# Patient Record
Sex: FEMALE | Race: ASIAN | NOT HISPANIC OR LATINO | Employment: FULL TIME | URBAN - METROPOLITAN AREA
[De-identification: names, ages, dates, MRNs, and addresses within clinical notes are randomized per-mention and may not be internally consistent; named-entity substitution may affect disease eponyms.]

---

## 2017-01-04 ENCOUNTER — TELEPHONE (OUTPATIENT)
Dept: TRANSPLANT | Facility: CLINIC | Age: 42
End: 2017-01-04

## 2017-02-02 ENCOUNTER — OFFICE VISIT (OUTPATIENT)
Dept: FAMILY MEDICINE | Facility: CLINIC | Age: 42
End: 2017-02-02
Payer: COMMERCIAL

## 2017-02-02 VITALS
HEIGHT: 59 IN | SYSTOLIC BLOOD PRESSURE: 94 MMHG | HEART RATE: 80 BPM | WEIGHT: 93 LBS | BODY MASS INDEX: 18.75 KG/M2 | DIASTOLIC BLOOD PRESSURE: 70 MMHG | TEMPERATURE: 99.1 F

## 2017-02-02 DIAGNOSIS — Z23 NEED FOR PROPHYLACTIC VACCINATION AND INOCULATION AGAINST INFLUENZA: ICD-10-CM

## 2017-02-02 DIAGNOSIS — Z12.39 BREAST CANCER SCREENING: ICD-10-CM

## 2017-02-02 DIAGNOSIS — Z00.00 ROUTINE GENERAL MEDICAL EXAMINATION AT A HEALTH CARE FACILITY: Primary | ICD-10-CM

## 2017-02-02 DIAGNOSIS — Z94.0 KIDNEY REPLACED BY TRANSPLANT: ICD-10-CM

## 2017-02-02 DIAGNOSIS — D84.9 IMMUNOSUPPRESSED STATUS (H): ICD-10-CM

## 2017-02-02 DIAGNOSIS — Z97.5 IUD (INTRAUTERINE DEVICE) IN PLACE: ICD-10-CM

## 2017-02-02 PROCEDURE — 90471 IMMUNIZATION ADMIN: CPT | Performed by: FAMILY MEDICINE

## 2017-02-02 PROCEDURE — 90686 IIV4 VACC NO PRSV 0.5 ML IM: CPT | Performed by: FAMILY MEDICINE

## 2017-02-02 PROCEDURE — 87624 HPV HI-RISK TYP POOLED RSLT: CPT | Performed by: FAMILY MEDICINE

## 2017-02-02 PROCEDURE — G0145 SCR C/V CYTO,THINLAYER,RESCR: HCPCS | Performed by: FAMILY MEDICINE

## 2017-02-02 PROCEDURE — 99396 PREV VISIT EST AGE 40-64: CPT | Mod: 25 | Performed by: FAMILY MEDICINE

## 2017-02-02 NOTE — MR AVS SNAPSHOT
After Visit Summary   2/2/2017    Nena Underwood    MRN: [id number]           Patient Information     Date Of Birth          1975        Visit Information        Provider Department      2/2/2017 3:00 PM Nena Quiroz MD [facility] [place]        Today's Diagnoses     Routine general medical examination at a health care facility    -  1     IUD (intrauterine device) in place         Breast cancer screening           Care Instructions      Preventive Health Recommendations  Female Ages 40 to 49    Yearly exam:     See your health care provider every year in order to  1. Review health changes.   2. Discuss preventive care.    3. Review your medicines if your doctor prescribed any.      Get a Pap test every three years (unless you have an abnormal result and your provider advises testing more often).      If you get Pap tests with HPV test, you only need to test every 5 years, unless you have an abnormal result. You do not need a Pap test if your uterus was removed (hysterectomy) and you have not had cancer.      You should be tested each year for STDs (sexually transmitted diseases), if you're at risk.       Ask your doctor if you should have a mammogram.      Have a colonoscopy (test for colon cancer) if someone in your family has had colon cancer or polyps before age 50.       Have a cholesterol test every 5 years.       Have a diabetes test (fasting glucose) after age 45. If you are at risk for diabetes, you should have this test every 3 years.    Shots: Get a flu shot each year. Get a tetanus shot every 10 years.     Nutrition:     Eat at least 5 servings of fruits and vegetables each day.    Eat whole-grain bread, whole-wheat pasta and brown rice instead of white grains and rice.    Talk to your provider about Calcium and Vitamin D.     Lifestyle    Exercise at least 150 minutes a week (an average of 30 minutes a day, 5 days a week). This will help you control your weight and  prevent disease.    Limit alcohol to one drink per day.    No smoking.     Wear sunscreen to prevent skin cancer.    See your dentist every six months for an exam and cleaning.        Follow-ups after your visit        Follow-up notes from your care team     Return in about 1 year (around 2/2/2018) for Physical Exam.      Your next 10 appointments already scheduled     Jun 20, 2017  3:40 PM   (Arrive by 3:25 PM)   Return Kidney Transplant with Walter Chong MD   [facility] ([facility] [place])    [address] [place] [id number] [phone]              Future tests that were ordered for you today     Open Future Orders        Priority Expected Expires Ordered    Lipid Profile Routine  2/2/2018 2/2/2017    Glucose Routine  2/2/2018 2/2/2017    *MA Screening Digital Bilateral Routine  2/2/2018 2/2/2017            Who to contact     If you have questions or need follow up information about today's clinic visit or your schedule please contact [facility] directly at [phone].  Normal or non-critical lab and imaging results will be communicated to you by TapShieldhart, letter or phone within 4 business days after the clinic has received the results. If you do not hear from us within 7 days, please contact the clinic through Educerust or phone. If you have a critical or abnormal lab result, we will notify you by phone as soon as possible.  Submit refill requests through [employer] or call your pharmacy and they will forward the refill request to us. Please allow 3 business days for your refill to be completed.          Additional Information About Your Visit        TapShieldhart Information     [employer] gives you secure access to your electronic health record. If you see a primary care provider, you can also send messages to your care team and make appointments. If you have questions, please call your primary care clinic.  If you do not have a primary  "care provider, please call [phone] and they will assist you.        Care EveryWhere ID     This is your Care EveryWhere ID. This could be used by other organizations to access your [facility] medical records  [id number]        Your Vitals Were     Pulse Temperature Height BMI (Body Mass Index) Last Period Breastfeeding?    80 99.1  F (37.3  C) (Tympanic) 4' 11\" (1.499 m) 18.77 kg/m2 01/27/2017 No       Blood Pressure from Last 3 Encounters:   02/02/17 94/70   03/31/16 120/63   03/21/16 113/72    Weight from Last 3 Encounters:   02/02/17 93 lb (42.185 kg)   03/30/16 91 lb 1.6 oz (41.323 kg)   03/21/16 93 lb (42.185 kg)              We Performed the Following     HPV High Risk Types DNA Cervical     Pap imaged thin layer screen with HPV - recommended age 30 - 65        Primary Care Provider Office Phone # Fax #    Jorge L Tucker [phone]       [address] [address]        Thank you!     Thank you for choosing [facility] [place]  for your care. Our goal is always to provide you with excellent care. Hearing back from our patients is one way we can continue to improve our services. Please take a few minutes to complete the written survey that you may receive in the mail after your visit with us. Thank you!             Your Updated Medication List - Protect others around you: Learn how to safely use, store and throw away your medicines at www.disposemymeds.org.          This list is accurate as of: 2/2/17  3:27 PM.  Always use your most recent med list.                   Brand Name Dispense Instructions for use    azaTHIOprine 50 MG tablet    IMURAN    180 tablet    Take 2 tablets (100 mg) by mouth daily       predniSONE 5 MG tablet    DELTASONE    90 tablet    TAKE ONE TABLET BY MOUTH EVERY DAY       sulfamethoxazole-trimethoprim 400-80 MG per tablet    BACTRIM/SEPTRA    90 tablet    Take 1 tablet by mouth daily       tacrolimus capsule     " 60 capsule    Take 1 capsule (0.5 mg) by mouth 2 times daily       TYLENOL PO      Take 500 mg by mouth every 4 hours as needed for mild pain or fever

## 2017-02-02 NOTE — PROGRESS NOTES
SUBJECTIVE:     CC: Nena Underwood is an 41 year old woman who presents for preventive health visit.     Healthy Habits:    Do you get at least three servings of calcium containing foods daily (dairy, green leafy vegetables, etc.)? no    Amount of exercise or daily activities, outside of work: 6-7 day(s) per week    Problems taking medications regularly No    Medication side effects: No    Have you had an eye exam in the past two years? yes    Do you see a dentist twice per year? yes    Do you have sleep apnea, excessive snoring or daytime drowsiness?no            Today's PHQ-2 Score:   PHQ-2 ( 1999 Pfizer) 2/2/2017 6/12/2015   Q1: Little interest or pleasure in doing things 0 0   Q2: Feeling down, depressed or hopeless 0 0   PHQ-2 Score 0 0       Abuse: Current or Past(Physical, Sexual or Emotional)- no  Do you feel safe in your environment - Yes    Social History   Substance Use Topics     Smoking status: Never Smoker      Smokeless tobacco: Never Used     Alcohol Use: No     The patient does not drink >3 drinks per day nor >7 drinks per week.    Recent Labs   Lab Test  12/21/10   0931  06/22/10   0737   CHOL  137  144   HDL  43*  45*   LDL  78  80   TRIG  80  98   CHOLHDLRATIO  3.2  3.2       Reviewed orders with patient.  Reviewed health maintenance and updated orders accordingly - Yes    Mammo Decision Support:  Patient under age 50, mutual decision reflected in health maintenance.      Pertinent mammograms are reviewed under the imaging tab.  History of abnormal Pap smear: NO - age 30- 65 PAP every 3 years recommended  All Histories reviewed and updated in Epic.      ROS:  C: NEGATIVE for fever, chills, change in weight  I: NEGATIVE for worrisome rashes, moles or lesions  E: NEGATIVE for vision changes or irritation  ENT: NEGATIVE for ear, mouth and throat problems  R: NEGATIVE for significant cough or SOB  B: NEGATIVE for masses, tenderness or discharge  CV: NEGATIVE for chest pain, palpitations or  peripheral edema  GI: NEGATIVE for nausea, abdominal pain, heartburn, or change in bowel habits  : NEGATIVE for unusual urinary or vaginal symptoms. Periods are regular.  M: NEGATIVE for significant arthralgias or myalgia  N: NEGATIVE for weakness, dizziness or paresthesias  P: NEGATIVE for changes in mood or affect    Patient Active Problem List   Diagnosis     Kidney replaced by transplant     CARDIOVASCULAR SCREENING; LDL GOAL LESS THAN 100     Immunosuppressed status (H)     Moraxella catarrhalis pneumonia (H)     IUD (intrauterine device) in place     Past Surgical History   Procedure Laterality Date     Insert intrauterine device       Paraguard. removed      Transplant kidney recipient living unrelated  6/23/10     Hemodialysis via av fistula       left arm      section  2014     Procedure:  SECTION;;  Surgeon: Griselda Becerra MD;  Location: [facility] L+D       Social History   Substance Use Topics     Smoking status: Never Smoker      Smokeless tobacco: Never Used     Alcohol Use: No     Family History   Problem Relation Age of Onset     Respiratory Paternal Grandmother      PGM had pulmonary TB at age 85, then  of old age at 89; she lived with [facility]     Eye Disorder Mother      retinal problems         Current Outpatient Prescriptions   Medication Sig Dispense Refill     predniSONE (DELTASONE) 5 MG tablet TAKE ONE TABLET BY MOUTH EVERY DAY 90 tablet 3     PROGRAF 0.5 MG PO CAPSULE Take 1 capsule (0.5 mg) by mouth 2 times daily 60 capsule 11     azaTHIOprine (IMURAN) 50 MG tablet Take 2 tablets (100 mg) by mouth daily 180 tablet 3     sulfamethoxazole-trimethoprim (BACTRIM,SEPTRA) 400-80 MG per tablet Take 1 tablet by mouth daily 90 tablet 3     Acetaminophen (TYLENOL PO) Take 500 mg by mouth every 4 hours as needed for mild pain or fever       Allergies   Allergen Reactions     Nkda [No Known Drug Allergies]      OBJECTIVE:     There were no vitals taken for this  visit.  EXAM:  GENERAL: healthy, alert and no distress  EYES: Eyes grossly normal to inspection, PERRL and conjunctivae and sclerae normal  HENT: ear canals and TM's normal, nose and mouth without ulcers or lesions  NECK: no adenopathy, no asymmetry, masses, or scars and thyroid normal to palpation  RESP: lungs clear to auscultation - no rales, rhonchi or wheezes  BREAST: normal without masses, tenderness or nipple discharge and no palpable axillary masses or adenopathy  CV: regular rate and rhythm, normal S1 S2, no S3 or S4, no murmur, click or rub, no peripheral edema and peripheral pulses strong  ABDOMEN: soft, nontender, no hepatosplenomegaly, no masses and bowel sounds normal   (female): normal female external genitalia, normal urethral meatus, vaginal mucosa pink, moist, well rugated, and normal cervix/adnexa/uterus without masses or discharge. IUD strings noted at the os.   MS: no gross musculoskeletal defects noted, no edema  SKIN: no suspicious lesions or rashes  NEURO: Normal strength and tone, mentation intact and speech normal  PSYCH: mentation appears normal, affect normal/bright    ASSESSMENT/PLAN:     1. Routine general medical examination at a health care facility  Screening pap and labs ordered. Patient would come in for lab visit next week, as she is not fasting  - Pap imaged thin layer screen with HPV - recommended age 30 - 65  - HPV High Risk Types DNA Cervical  - Lipid Profile; Future  - Glucose; Future    2. IUD (intrauterine device) in place  Paragard is in place    3. Breast cancer screening  Screening mammogram ordered  - *MA Screening Digital Bilateral; Future    4. Need for prophylactic vaccination and inoculation against influenza    - FLU VAC, SPLIT VIRUS IM > 3 YO (QUADRIVALENT) [50196]  - Vaccine Administration, Initial [62557]    5. Immunosuppressed status (H)  As the patient in on anti-rejection medications.     6. Kidney replaced by transplant  Follow up with transplant provider.  "Recommended to discuss pneumovax with them. I do not see the records and patient is unsure about it.       COUNSELING:   Reviewed preventive health counseling, as reflected in patient instructions       Regular exercise       Healthy diet/nutrition         reports that she has never smoked. She has never used smokeless tobacco.    Estimated body mass index is 18.39 kg/(m^2) as calculated from the following:    Height as of 3/28/16: 4' 11.02\" (1.499 m).    Weight as of 3/28/16: 91 lb 1.6 oz (41.323 kg).       Counseling Resources:  ATP IV Guidelines  Pooled Cohorts Equation Calculator  Breast Cancer Risk Calculator  FRAX Risk Assessment  ICSI Preventive Guidelines  Dietary Guidelines for Americans, 2010  USDA's MyPlate  ASA Prophylaxis  Lung CA Screening    Nena Quiroz MD  [facility] [place]  "

## 2017-02-02 NOTE — PROGRESS NOTES
Injectable Influenza Immunization Documentation    1.  Is the person to be vaccinated sick today?  No    2. Does the person to be vaccinated have an allergy to eggs or to a component of the vaccine?  No    3. Has the person to be vaccinated today ever had a serious reaction to influenza vaccine in the past?  No    4. Has the person to be vaccinated ever had Guillain-[place] syndrome?  No     Form completed by patient

## 2017-02-02 NOTE — PROGRESS NOTES
"Chief Complaint   Patient presents with     Physical       Initial BP 94/70 mmHg  Pulse 80  Temp(Src) 99.1  F (37.3  C) (Tympanic)  Ht 4' 11\" (1.499 m)  Wt 93 lb (42.185 kg)  BMI 18.77 kg/m2  LMP 01/27/2017  Breastfeeding? No Estimated body mass index is 18.77 kg/(m^2) as calculated from the following:    Height as of this encounter: 4' 11\" (1.499 m).    Weight as of this encounter: 93 lb (42.185 kg).  BP completed using cuff size: regular Twila GALLARDO MA Student  "

## 2017-02-04 ENCOUNTER — HOSPITAL ENCOUNTER (INPATIENT)
Facility: CLINIC | Age: 42
LOS: 2 days | Discharge: HOME | DRG: 699 | End: 2017-02-06
Attending: FAMILY MEDICINE | Admitting: INTERNAL MEDICINE
Payer: COMMERCIAL

## 2017-02-04 ENCOUNTER — APPOINTMENT (OUTPATIENT)
Dept: ULTRASOUND IMAGING | Facility: CLINIC | Age: 42
DRG: 699 | End: 2017-02-04
Attending: FAMILY MEDICINE
Payer: COMMERCIAL

## 2017-02-04 DIAGNOSIS — Z94.0 KIDNEY REPLACED BY TRANSPLANT: ICD-10-CM

## 2017-02-04 DIAGNOSIS — R50.9 FEVER, UNSPECIFIED: ICD-10-CM

## 2017-02-04 DIAGNOSIS — N12 PYELONEPHRITIS: ICD-10-CM

## 2017-02-04 DIAGNOSIS — R10.31 RLQ ABDOMINAL PAIN: ICD-10-CM

## 2017-02-04 LAB
ALBUMIN SERPL-MCNC: 3.2 G/DL (ref 3.4–5)
ALBUMIN UR-MCNC: NEGATIVE MG/DL
ALP SERPL-CCNC: 98 U/L (ref 40–150)
ALT SERPL W P-5'-P-CCNC: 33 U/L (ref 0–50)
ANION GAP SERPL CALCULATED.3IONS-SCNC: 7 MMOL/L (ref 3–14)
APPEARANCE UR: CLEAR
AST SERPL W P-5'-P-CCNC: 38 U/L (ref 0–45)
BASOPHILS # BLD AUTO: 0 10E9/L (ref 0–0.2)
BASOPHILS NFR BLD AUTO: 0.1 %
BILIRUB SERPL-MCNC: 0.5 MG/DL (ref 0.2–1.3)
BILIRUB UR QL STRIP: NEGATIVE
BUN SERPL-MCNC: 17 MG/DL (ref 7–30)
CALCIUM SERPL-MCNC: 8.6 MG/DL (ref 8.5–10.1)
CHLORIDE SERPL-SCNC: 104 MMOL/L (ref 94–109)
CO2 SERPL-SCNC: 26 MMOL/L (ref 20–32)
COLOR UR AUTO: ABNORMAL
CREAT SERPL-MCNC: 0.94 MG/DL (ref 0.52–1.04)
DIFFERENTIAL METHOD BLD: ABNORMAL
EOSINOPHIL # BLD AUTO: 0 10E9/L (ref 0–0.7)
EOSINOPHIL NFR BLD AUTO: 0.3 %
ERYTHROCYTE [DISTWIDTH] IN BLOOD BY AUTOMATED COUNT: 13.2 % (ref 10–15)
GFR SERPL CREATININE-BSD FRML MDRD: 65 ML/MIN/1.7M2
GLUCOSE SERPL-MCNC: 76 MG/DL (ref 70–99)
GLUCOSE UR STRIP-MCNC: NEGATIVE MG/DL
HCG UR QL: NORMAL
HCT VFR BLD AUTO: 33.7 % (ref 35–47)
HGB BLD-MCNC: 11.9 G/DL (ref 11.7–15.7)
HGB UR QL STRIP: NEGATIVE
IMM GRANULOCYTES # BLD: 0 10E9/L (ref 0–0.4)
IMM GRANULOCYTES NFR BLD: 0.4 %
INTERNAL QC OK POCT: YES
KETONES UR STRIP-MCNC: NEGATIVE MG/DL
LACTATE BLD-SCNC: 1 MMOL/L (ref 0.7–2.1)
LEUKOCYTE ESTERASE UR QL STRIP: ABNORMAL
LYMPHOCYTES # BLD AUTO: 0.5 10E9/L (ref 0.8–5.3)
LYMPHOCYTES NFR BLD AUTO: 6.3 %
MCH RBC QN AUTO: 37 PG (ref 26.5–33)
MCHC RBC AUTO-ENTMCNC: 35.3 G/DL (ref 31.5–36.5)
MCV RBC AUTO: 105 FL (ref 78–100)
MONOCYTES # BLD AUTO: 0.9 10E9/L (ref 0–1.3)
MONOCYTES NFR BLD AUTO: 12 %
NEUTROPHILS # BLD AUTO: 5.9 10E9/L (ref 1.6–8.3)
NEUTROPHILS NFR BLD AUTO: 80.9 %
NITRATE UR QL: NEGATIVE
NRBC # BLD AUTO: 0 10*3/UL
NRBC BLD AUTO-RTO: 0 /100
PH UR STRIP: 6 PH (ref 5–7)
PLATELET # BLD AUTO: 229 10E9/L (ref 150–450)
POTASSIUM SERPL-SCNC: 3.2 MMOL/L (ref 3.4–5.3)
PROT SERPL-MCNC: 7.8 G/DL (ref 6.8–8.8)
RBC # BLD AUTO: 3.22 10E12/L (ref 3.8–5.2)
RBC #/AREA URNS AUTO: 1 /HPF (ref 0–2)
SODIUM SERPL-SCNC: 137 MMOL/L (ref 133–144)
SP GR UR STRIP: 1 (ref 1–1.03)
SQUAMOUS #/AREA URNS AUTO: 1 /HPF (ref 0–1)
URN SPEC COLLECT METH UR: ABNORMAL
UROBILINOGEN UR STRIP-MCNC: NORMAL MG/DL (ref 0–2)
WBC # BLD AUTO: 7.3 10E9/L (ref 4–11)
WBC #/AREA URNS AUTO: 20 /HPF (ref 0–2)

## 2017-02-04 PROCEDURE — 96375 TX/PRO/DX INJ NEW DRUG ADDON: CPT | Performed by: FAMILY MEDICINE

## 2017-02-04 PROCEDURE — 87186 SC STD MICRODIL/AGAR DIL: CPT | Performed by: EMERGENCY MEDICINE

## 2017-02-04 PROCEDURE — 25000132 ZZH RX MED GY IP 250 OP 250 PS 637

## 2017-02-04 PROCEDURE — 12000025 ZZH R&B TRANSPLANT INTERMEDIATE

## 2017-02-04 PROCEDURE — 85025 COMPLETE CBC W/AUTO DIFF WBC: CPT | Performed by: FAMILY MEDICINE

## 2017-02-04 PROCEDURE — 96365 THER/PROPH/DIAG IV INF INIT: CPT | Performed by: FAMILY MEDICINE

## 2017-02-04 PROCEDURE — 87088 URINE BACTERIA CULTURE: CPT | Performed by: EMERGENCY MEDICINE

## 2017-02-04 PROCEDURE — 99222 1ST HOSP IP/OBS MODERATE 55: CPT | Mod: AI | Performed by: INTERNAL MEDICINE

## 2017-02-04 PROCEDURE — 83605 ASSAY OF LACTIC ACID: CPT | Performed by: INTERNAL MEDICINE

## 2017-02-04 PROCEDURE — 80053 COMPREHEN METABOLIC PANEL: CPT | Performed by: FAMILY MEDICINE

## 2017-02-04 PROCEDURE — 96361 HYDRATE IV INFUSION ADD-ON: CPT | Performed by: FAMILY MEDICINE

## 2017-02-04 PROCEDURE — 36415 COLL VENOUS BLD VENIPUNCTURE: CPT | Performed by: INTERNAL MEDICINE

## 2017-02-04 PROCEDURE — 25000128 H RX IP 250 OP 636: Performed by: FAMILY MEDICINE

## 2017-02-04 PROCEDURE — 25000128 H RX IP 250 OP 636: Performed by: NURSE PRACTITIONER

## 2017-02-04 PROCEDURE — 81025 URINE PREGNANCY TEST: CPT | Performed by: FAMILY MEDICINE

## 2017-02-04 PROCEDURE — 87086 URINE CULTURE/COLONY COUNT: CPT | Performed by: FAMILY MEDICINE

## 2017-02-04 PROCEDURE — 25000132 ZZH RX MED GY IP 250 OP 250 PS 637: Performed by: FAMILY MEDICINE

## 2017-02-04 PROCEDURE — 99285 EMERGENCY DEPT VISIT HI MDM: CPT | Mod: Z6 | Performed by: FAMILY MEDICINE

## 2017-02-04 PROCEDURE — 87086 URINE CULTURE/COLONY COUNT: CPT | Performed by: EMERGENCY MEDICINE

## 2017-02-04 PROCEDURE — 25000131 ZZH RX MED GY IP 250 OP 636 PS 637: Performed by: NURSE PRACTITIONER

## 2017-02-04 PROCEDURE — 76776 US EXAM K TRANSPL W/DOPPLER: CPT

## 2017-02-04 PROCEDURE — 81001 URINALYSIS AUTO W/SCOPE: CPT | Performed by: EMERGENCY MEDICINE

## 2017-02-04 PROCEDURE — 99285 EMERGENCY DEPT VISIT HI MDM: CPT | Performed by: FAMILY MEDICINE

## 2017-02-04 RX ORDER — SODIUM CHLORIDE 9 MG/ML
INJECTION, SOLUTION INTRAVENOUS CONTINUOUS
Status: DISCONTINUED | OUTPATIENT
Start: 2017-02-04 | End: 2017-02-06 | Stop reason: HOSPADM

## 2017-02-04 RX ORDER — LIDOCAINE 40 MG/G
CREAM TOPICAL
Status: DISCONTINUED | OUTPATIENT
Start: 2017-02-04 | End: 2017-02-06 | Stop reason: HOSPADM

## 2017-02-04 RX ORDER — PROCHLORPERAZINE 25 MG
25 SUPPOSITORY, RECTAL RECTAL EVERY 12 HOURS PRN
Status: DISCONTINUED | OUTPATIENT
Start: 2017-02-04 | End: 2017-02-06 | Stop reason: HOSPADM

## 2017-02-04 RX ORDER — PHENAZOPYRIDINE HYDROCHLORIDE 100 MG/1
100 TABLET, FILM COATED ORAL
Status: DISCONTINUED | OUTPATIENT
Start: 2017-02-04 | End: 2017-02-06 | Stop reason: HOSPADM

## 2017-02-04 RX ORDER — AMOXICILLIN 250 MG
1-2 CAPSULE ORAL 2 TIMES DAILY PRN
Status: DISCONTINUED | OUTPATIENT
Start: 2017-02-04 | End: 2017-02-06 | Stop reason: HOSPADM

## 2017-02-04 RX ORDER — CIPROFLOXACIN 2 MG/ML
400 INJECTION, SOLUTION INTRAVENOUS ONCE
Status: COMPLETED | OUTPATIENT
Start: 2017-02-04 | End: 2017-02-04

## 2017-02-04 RX ORDER — PREDNISONE 5 MG/1
5 TABLET ORAL DAILY
Status: DISCONTINUED | OUTPATIENT
Start: 2017-02-05 | End: 2017-02-06 | Stop reason: HOSPADM

## 2017-02-04 RX ORDER — IBUPROFEN 200 MG
TABLET ORAL
Status: COMPLETED
Start: 2017-02-04 | End: 2017-02-04

## 2017-02-04 RX ORDER — SULFAMETHOXAZOLE AND TRIMETHOPRIM 400; 80 MG/1; MG/1
1 TABLET ORAL DAILY
Status: DISCONTINUED | OUTPATIENT
Start: 2017-02-05 | End: 2017-02-06 | Stop reason: HOSPADM

## 2017-02-04 RX ORDER — ONDANSETRON 2 MG/ML
4 INJECTION INTRAMUSCULAR; INTRAVENOUS EVERY 30 MIN PRN
Status: DISCONTINUED | OUTPATIENT
Start: 2017-02-04 | End: 2017-02-04

## 2017-02-04 RX ORDER — ACETAMINOPHEN 325 MG/1
975 TABLET ORAL EVERY 4 HOURS PRN
Status: DISCONTINUED | OUTPATIENT
Start: 2017-02-04 | End: 2017-02-04

## 2017-02-04 RX ORDER — AZATHIOPRINE 50 MG/1
100 TABLET ORAL DAILY
Status: DISCONTINUED | OUTPATIENT
Start: 2017-02-05 | End: 2017-02-06 | Stop reason: HOSPADM

## 2017-02-04 RX ORDER — IBUPROFEN 200 MG
200 TABLET ORAL ONCE
Status: COMPLETED | OUTPATIENT
Start: 2017-02-04 | End: 2017-02-04

## 2017-02-04 RX ORDER — TACROLIMUS 0.5 MG/1
0.5 CAPSULE, GELATIN COATED ORAL 2 TIMES DAILY
Status: DISCONTINUED | OUTPATIENT
Start: 2017-02-04 | End: 2017-02-06 | Stop reason: HOSPADM

## 2017-02-04 RX ORDER — HYDROMORPHONE HYDROCHLORIDE 1 MG/ML
0.5 INJECTION, SOLUTION INTRAMUSCULAR; INTRAVENOUS; SUBCUTANEOUS ONCE
Status: COMPLETED | OUTPATIENT
Start: 2017-02-04 | End: 2017-02-04

## 2017-02-04 RX ORDER — POTASSIUM CHLORIDE 1.5 G/1.58G
20 POWDER, FOR SOLUTION ORAL ONCE
Status: COMPLETED | OUTPATIENT
Start: 2017-02-04 | End: 2017-02-04

## 2017-02-04 RX ORDER — LIDOCAINE 40 MG/G
CREAM TOPICAL
Status: DISCONTINUED | OUTPATIENT
Start: 2017-02-04 | End: 2017-02-04

## 2017-02-04 RX ORDER — NALOXONE HYDROCHLORIDE 0.4 MG/ML
.1-.4 INJECTION, SOLUTION INTRAMUSCULAR; INTRAVENOUS; SUBCUTANEOUS
Status: DISCONTINUED | OUTPATIENT
Start: 2017-02-04 | End: 2017-02-06 | Stop reason: HOSPADM

## 2017-02-04 RX ORDER — ONDANSETRON 2 MG/ML
4 INJECTION INTRAMUSCULAR; INTRAVENOUS EVERY 6 HOURS PRN
Status: DISCONTINUED | OUTPATIENT
Start: 2017-02-04 | End: 2017-02-06 | Stop reason: HOSPADM

## 2017-02-04 RX ORDER — SODIUM CHLORIDE 9 MG/ML
1000 INJECTION, SOLUTION INTRAVENOUS CONTINUOUS
Status: DISCONTINUED | OUTPATIENT
Start: 2017-02-04 | End: 2017-02-04

## 2017-02-04 RX ORDER — ACETAMINOPHEN 325 MG/1
975 TABLET ORAL EVERY 8 HOURS PRN
Status: DISCONTINUED | OUTPATIENT
Start: 2017-02-04 | End: 2017-02-06 | Stop reason: HOSPADM

## 2017-02-04 RX ORDER — PROCHLORPERAZINE MALEATE 5 MG
5-10 TABLET ORAL EVERY 6 HOURS PRN
Status: DISCONTINUED | OUTPATIENT
Start: 2017-02-04 | End: 2017-02-06 | Stop reason: HOSPADM

## 2017-02-04 RX ORDER — ONDANSETRON 4 MG/1
4 TABLET, ORALLY DISINTEGRATING ORAL EVERY 6 HOURS PRN
Status: DISCONTINUED | OUTPATIENT
Start: 2017-02-04 | End: 2017-02-06 | Stop reason: HOSPADM

## 2017-02-04 RX ORDER — CIPROFLOXACIN 2 MG/ML
400 INJECTION, SOLUTION INTRAVENOUS EVERY 12 HOURS
Status: DISCONTINUED | OUTPATIENT
Start: 2017-02-05 | End: 2017-02-06 | Stop reason: HOSPADM

## 2017-02-04 RX ADMIN — ONDANSETRON 4 MG: 2 INJECTION INTRAMUSCULAR; INTRAVENOUS at 19:59

## 2017-02-04 RX ADMIN — ACETAMINOPHEN 975 MG: 325 TABLET, FILM COATED ORAL at 15:37

## 2017-02-04 RX ADMIN — TACROLIMUS 0.5 MG: 0.5 CAPSULE, GELATIN COATED ORAL at 19:59

## 2017-02-04 RX ADMIN — POTASSIUM CHLORIDE 20 MEQ: 1.5 POWDER, FOR SOLUTION ORAL at 13:43

## 2017-02-04 RX ADMIN — IBUPROFEN 200 MG: 200 TABLET, FILM COATED ORAL at 18:26

## 2017-02-04 RX ADMIN — SODIUM CHLORIDE 1000 ML: 9 INJECTION, SOLUTION INTRAVENOUS at 13:43

## 2017-02-04 RX ADMIN — SODIUM CHLORIDE 1000 ML: 900 INJECTION, SOLUTION INTRAVENOUS at 12:11

## 2017-02-04 RX ADMIN — Medication 200 MG: at 18:26

## 2017-02-04 RX ADMIN — HYDROMORPHONE HYDROCHLORIDE 0.5 MG: 10 INJECTION, SOLUTION INTRAMUSCULAR; INTRAVENOUS; SUBCUTANEOUS at 12:27

## 2017-02-04 RX ADMIN — CIPROFLOXACIN 400 MG: 2 INJECTION, SOLUTION INTRAVENOUS at 15:22

## 2017-02-04 ASSESSMENT — ENCOUNTER SYMPTOMS
CONFUSION: 0
APPETITE CHANGE: 1
ABDOMINAL PAIN: 1
WEAKNESS: 1
NECK STIFFNESS: 0
ARTHRALGIAS: 0
SLEEP DISTURBANCE: 0
SHORTNESS OF BREATH: 0
FEVER: 0
DYSURIA: 1
COLOR CHANGE: 0
SINUS PRESSURE: 0
NERVOUS/ANXIOUS: 1
EYE REDNESS: 0
BRUISES/BLEEDS EASILY: 0
HEADACHES: 0
NAUSEA: 0
ACTIVITY CHANGE: 1
FATIGUE: 1
VOMITING: 0
FREQUENCY: 1
NUMBNESS: 1
DIFFICULTY URINATING: 0

## 2017-02-04 ASSESSMENT — PAIN DESCRIPTION - DESCRIPTORS: DESCRIPTORS: TENDER

## 2017-02-04 NOTE — ED NOTES
"Triage Assessment & Note:    /82 mmHg  Pulse 92  Temp(Src) 98.6  F (37  C) (Oral)  Resp 16  Ht 1.499 m (4' 11\")  Wt 41.731 kg (92 lb)  BMI 18.57 kg/m2  SpO2 98%  LMP 01/27/2017  Breastfeeding? No      Patient presents with: Kidney tx pt here with c/o of UTI.  Pt reports that she has had before and it feels like one is starting.  No reports of fever or cough.     Home Treatments/Remedies: Home medication    Febrile / Afebrile: Afebrile    Duration of C/o: <24 hours    Yolanda Thomas RN  February 4, 2017        "

## 2017-02-04 NOTE — IP AVS SNAPSHOT
[address] [address]    Phone:  [phone]                                       After Visit Summary   2/4/2017    Nena Underwood    MRN: [id number]           After Visit Summary Signature Page     I have received my discharge instructions, and my questions have been answered. I have discussed any challenges I see with this plan with the nurse or doctor.    ..........................................................................................................................................  Patient/Patient Representative Signature      ..........................................................................................................................................  Patient Representative Print Name and Relationship to Patient    ..................................................               ................................................  Date                                            Time    ..........................................................................................................................................  Reviewed by Signature/Title    ...................................................              ..............................................  Date                                                            Time

## 2017-02-04 NOTE — H&P
[facility] History and Physical  Department of Internal Medicine    Patient Name: Nena Underwood MRN# [id number]   Age: 41 year old YOB: 1975     Date of Admission:2/4/2017    Primary care provider: Jorge L Tucker  Date of Service: 2/4/2017  Admitting Team: [facility]         Assessment and Plan:   Nena Underwood is a 41 year old woman with a history of kidney transplant in 2010 and treated latent TB who presented to the ED today with urinary frequency, dysuria and pain at the site of her transplanted kidney.    1) Pyelonephritis of transplanted kidney - Presented with urinary frequency and dysuria but later mentioned pain at her transplanted kidney and developed fevers to 102 in our ED.  Transplant nephrology was contacted and recommended admission.  - NS @ 100 ccs/hr  - Tylenol for fever  - UCs reviewed but no prior helpful micro.  Will treat with Ciprofloxacin  - Follow BC, UC    2) History of kidney transplant - Appears to be doing quite well per nephrology notes.  Cr 0.94  - Continue Imuran, prednisone, prograf  - Continue Bactrim for prophylaxis    CODE: Full  Diet/IVF: Regular  DVT ppx: Ambulatory  Disposition/Admission Status: Inpatient    Jorge Mcleod  Internal Medicine Hospitalist & [facility]  Pager: [phone]           Chief Complaint:   Urinary frequency, dysuria         HPI:   Nena Underwood is a 41 year old woman with a history of kidney transplant in 2010 and treated latent TB who presented to the ED today with urinary frequency, dysuria and pain at the site of her transplanted kidney.    The patient reports her symptoms came on this morning.  She noted frequency and dysuria and later noted pain at her transplanted kidney.  She also has a headache.  She did not note fevers at home but was 102 in our ED.  No chest pain, shortness of breath, nausea, vomiting, diarrhea, constipation.         Past Medical History:     Past Medical History    Diagnosis Date     Renal aplasia      since birth     TB (pulmonary tuberculosis)      treated with 4 drug regimen (INH, rifampin, ethambutol and pyrazinamide) for 6 months     Kidney replaced by transplant 6/23/10     KIDNEY TRANSPLANT STATUS     Hyperparathyroidism, secondary renal (H)      Single seizure (H)      felt secondary to uremia     High risk medication use      Immunosuppressed status (H)      Pneumonia 2013     requiring hospitalization     Anemia      Moraxella catarrhalis pneumonia (HCC) 3/28/2016     IUD (intrauterine device) in place       4/3/14       PMH reviewed and up to date         Past Surgical History:     Past Surgical History   Procedure Laterality Date     Insert intrauterine device       Paraguard. removed      Transplant kidney recipient living unrelated  6/23/10     Hemodialysis via av fistula       left arm      section  2014     Procedure:  SECTION;;  Surgeon: Griselda Becerra MD;  Location:  L+D       [facility] reviewed and up to date         Social History:     Social History     Social History     Marital Status:      Spouse Name: N/A     Number of Children: N/A     Years of Education: N/A     Occupational History           [employer]     Social History Main Topics     Smoking status: Never Smoker      Smokeless tobacco: Never Used     Alcohol Use: No     Drug Use: No     Sexual Activity:     Partners: Male     Birth Control/ Protection: IUD     Other Topics Concern      Service No     Blood Transfusions No     Weight Concern No     Exercise No     Bike Helmet Not Asked     na     Seat Belt Yes     Social History Narrative    Ms. Underwood emigrated from the [place] in May, [place]. She is currently living with her  in [place], [place].  She works for the [employer] in [place] (a ).             Family History:     Family History   Problem Relation Age of Onset  "    Respiratory Paternal Grandmother      PGM had pulmonary TB at age 85, then  of old age at 89; she lived with [facility]     Eye Disorder Mother      retinal problems            Immunizations:     Immunization History   Administered Date(s) Administered     Hepatitis B 2008, 2008, 10/03/2008     Influenza (IIV3) 2008, 2011, 2012, 10/17/2013     Influenza Vaccine IM 3yrs+ 4 Valent IIV4 2017     Mantoux 2008     TDAP (ADACEL AGES 11-64) 2013              Allergies:      Allergies   Allergen Reactions     Nkda [No Known Drug Allergies]             Medications:     Prior to Admission medications    Medication Sig Last Dose Taking? Auth Provider   predniSONE (DELTASONE) 5 MG tablet TAKE ONE TABLET BY MOUTH EVERY DAY 2/3/2017 at Unknown time Yes Walter Chong MD   PROGRAF 0.5 MG PO CAPSULE Take 1 capsule (0.5 mg) by mouth 2 times daily 2017 at Unknown time Yes Walter Chong MD   azaTHIOprine (IMURAN) 50 MG tablet Take 2 tablets (100 mg) by mouth daily 2/3/2017 at Unknown time Yes Mendy Alvarado MD   sulfamethoxazole-trimethoprim (BACTRIM,SEPTRA) 400-80 MG per tablet Take 1 tablet by mouth daily 2017 at Unknown time Yes Janis Gonzalez MD   Acetaminophen (TYLENOL PO) Take 500 mg by mouth every 4 hours as needed for mild pain or fever More than a month at Unknown time  Reported, Patient             Review of Systems:     A complete ROS was performed and is negative other than what is stated in the HPI.         Physical Exam:   Blood pressure 93/66, pulse 90, temperature 102  F (38.9  C), temperature source Oral, resp. rate 16, height 1.499 m (4' 11\"), weight 41.731 kg (92 lb), last menstrual period 2017, SpO2 98 %, not currently breastfeeding.    Physical Exam   Constitutional:   Well nourished, well developed, uncomfortable appearing and covered in blankets   Head: Normocephalic and atraumatic.   Eyes: Conjunctivae are normal. " Pupils are equal, round, and reactive to light.    Oral: Pharynx has no erythema or exudate, mucous membranes are moist  Neck:   No adenopathy, no bony tenderness  Cardiovascular: Regular rate and rhythm without murmurs or gallops  Pulmonary/Chest: Clear to auscultation bilaterally, with no wheezes or retractions. No respiratory distress.  GI: Soft with good bowel sounds.  Non-tender, non-distended, with no guarding, no rebound, no peritoneal signs.   Back:  No bony or CVA tenderness   Musculoskeletal:  No edema or clubbing   Skin: Skin is warm and dry. No rash noted.   Neurological: Alert and oriented to person, place, and time. Nonfocal exam  Psychiatric:  Normal mood and affect.         Data:   EXAMINATION:  RENAL TRANSPLANT, 2/4/2017 12:35 PM       COMPARISON: 12/28/2015     HISTORY: Right lower quadrant pain, UTI symptoms     TECHNIQUE:  Grey-scale, color Doppler and spectral flow analysis.     FINDINGS:  The transplant kidney is located right lower quadrant, and measures 11  cm length. Parenchyma is of normal thickness and echogenicity.  No  hydronephrosis. No perinephric fluid collection. Again seen superior  pole simple renal cyst measuring up to 8 mm.     Renal Vein Flow:  28 cm/sec at hilum.    71 cm/sec at anastomosis.     Upper Pole Arcuate artery:  0.63 resistive index.     Mid pole Arcuate artery:  0.59 resistive index.       Lower Pole Arcuate artery:  0.61 resistive index.       Renal artery flow:    88 cm/sec peak systolic at hilum.  161 cm/sec peak systolic at anastomosis.     Iliac artery flow:  87 cm/sec peak systolic above anastomosis.  42 cm/sec peak systolic below anastomosis.     Iliac vein is patent above and below the anastomosis..                                                                       IMPRESSION:  No significant change since 12/28/2015. Normal grayscale appearance of  the transplant kidney with patent Doppler evaluation.     I have personally reviewed the examination and  initial interpretation  and I agree with the findings.     ALENA KNIGHT MD    I spoke with Dr Lindsey regarding patient's plan of care  I reviewed past notes, imaging and labs      SHERIDAN Petit

## 2017-02-04 NOTE — ED PROVIDER NOTES
History     Chief Complaint   Patient presents with     Rule out Urinary Tract Infection     HPI  Nena Underwood is a 41 year old female with a history of kidney transplant on 2010 who presents to the Emergency Department with frequency. She states that she awoke with frequency and tingling. Patient reports right lower quadrant abdominal pain, where her transplanted kidney is. the patient reports some numbess in her right lateral hip and states that she has these symptoms with UTIs. She denies dysuria. This is patient's first UTI since . She denies any chance of pregnancy and reports that her LMP was 2017.    Patient denies any diarrhea no blood in stool no rash noted.  No coughing.  No reports of fever.  No vaginal discharge.    I have reviewed the Medications, Allergies, Past Medical and Surgical History, and Social History in the [employer] system.    PAST MEDICAL HISTORY  Past Medical History   Diagnosis Date     Renal aplasia      since birth     TB (pulmonary tuberculosis)      treated with 4 drug regimen (INH, rifampin, ethambutol and pyrazinamide) for 6 months     Kidney replaced by transplant 6/23/10     KIDNEY TRANSPLANT STATUS     Hyperparathyroidism, secondary renal (H)      Single seizure (H)      felt secondary to uremia     High risk medication use      Immunosuppressed status (H)      Pneumonia 2013     requiring hospitalization     Anemia      Moraxella catarrhalis pneumonia (HCC) 3/28/2016     IUD (intrauterine device) in place       4/3/14     PAST SURGICAL HISTORY  Past Surgical History   Procedure Laterality Date     Insert intrauterine device       Paraguard. removed      Transplant kidney recipient living unrelated  6/23/10     Hemodialysis via av fistula       left arm      section  2014     Procedure:  SECTION;;  Surgeon: Griselda Becerra MD;  Location: [facility] L+D     FAMILY HISTORY  Family History   Problem Relation Age of Onset      Respiratory Paternal Grandmother      PGM had pulmonary TB at age 85, then  of old age at 89; she lived with [facility]     Eye Disorder Mother      retinal problems     SOCIAL HISTORY  Social History   Substance Use Topics     Smoking status: Never Smoker      Smokeless tobacco: Never Used     Alcohol Use: No     MEDICATIONS  Current Facility-Administered Medications   Medication     [START ON 2017] azaTHIOprine (IMURAN) tablet 100 mg     [START ON 2017] predniSONE (DELTASONE) tablet 5 mg     tacrolimus (PROGRAF BRAND) capsule 0.5 mg     [START ON 2017] sulfamethoxazole-trimethoprim (BACTRIM/SEPTRA) 400-80 MG per tablet 1 tablet     naloxone (NARCAN) injection 0.1-0.4 mg     lidocaine 1 % 1 mL     lidocaine (LMX4) kit     sodium chloride (PF) 0.9% PF flush 3 mL     sodium chloride (PF) 0.9% PF flush 3 mL     0.9% sodium chloride infusion     acetaminophen (TYLENOL) tablet 975 mg     senna-docusate (SENOKOT-S;PERICOLACE) 8.6-50 MG per tablet 1-2 tablet     ondansetron (ZOFRAN-ODT) ODT tab 4 mg    Or     ondansetron (ZOFRAN) injection 4 mg     prochlorperazine (COMPAZINE) injection 5-10 mg    Or     prochlorperazine (COMPAZINE) tablet 5-10 mg    Or     prochlorperazine (COMPAZINE) Suppository 25 mg     [START ON 2017] ciprofloxacin (CIPRO) infusion 400 mg     phenazopyridine (PYRIDIUM) tablet 100 mg     ALLERGIES  Allergies   Allergen Reactions     Nkda [No Known Drug Allergies]       Review of Systems   Constitutional: Positive for activity change, appetite change and fatigue. Negative for fever.   HENT: Negative for congestion, sinus pressure and sneezing.    Eyes: Negative for redness and visual disturbance.   Respiratory: Negative for shortness of breath.    Cardiovascular: Negative for chest pain.   Gastrointestinal: Positive for abdominal pain (right sided). Negative for nausea and vomiting.   Genitourinary: Positive for dysuria, urgency and frequency. Negative for difficulty urinating.  "  Musculoskeletal: Negative for arthralgias and neck stiffness.   Skin: Negative for color change.   Neurological: Positive for weakness (generalized) and numbness (see HPI). Negative for headaches.   Hematological: Does not bruise/bleed easily.   Psychiatric/Behavioral: Negative for confusion, sleep disturbance and self-injury. The patient is nervous/anxious.    All other systems reviewed and are negative.      Physical Exam   BP: 130/82 mmHg  Pulse: 92  Temp: 98.6  F (37  C)  Resp: 16  Height: 149.9 cm (4' 11\")  Weight: 41.731 kg (92 lb)  SpO2: 98 %  Physical Exam   Constitutional: She is oriented to person, place, and time. She appears well-developed and well-nourished. She appears distressed.   Patient tearful well-hydrated   HENT:   Head: Normocephalic and atraumatic.   Eyes: Conjunctivae and EOM are normal. Pupils are equal, round, and reactive to light. No scleral icterus.   Neck: Normal range of motion. Neck supple. No JVD present.   Cardiovascular: Regular rhythm.    Pulmonary/Chest: No stridor. No respiratory distress. She has no wheezes.   Abdominal: She exhibits no distension. There is tenderness (right lower quadrant tenderness noted transplant site). There is no rebound and no guarding.   Musculoskeletal: She exhibits no edema or tenderness.   Neurological: She is alert and oriented to person, place, and time. She has normal reflexes. No cranial nerve deficit. Coordination normal.   Skin: Skin is warm and dry. No rash noted. She is not diaphoretic. No erythema. No pallor.   Psychiatric:   Tearful otherwise appropriate   Nursing note and vitals reviewed.      ED Course     Procedures         Patient course in ER an IV was established.  Patient received 2 L normal saline bolus in the ER.  Labs are drawn and reviewed.    Patient's urinalysis reveals 20 white cells with moderate leukocyte esterase.  She is not pregnant.  Patient's white count was 7.3 hemoglobin 11.9 creatinine 0.94 potassium 3.2.  Review " of present ultrasound was done.  No significant change noted.    Reassessment of patient discussed with nephrology also patient was receiving 400 mg of Cipro IV in the ER.    Patient spiked a fever then want to was given Tylenol 1 g persistent fever 103 patient still feeling chilled etc.  Discussed with medicine and will admit to their service.    Patient will be admitted to medicine I did order a CT scan noncontrast which we done patient though did go to the admitting service but will have the CT scan done to make sure there is no other findings such as appendicitis.    Patient given K-Rolanda 20 mEq orally also here in the ER.    Discussed with nephrology also with persistent fever we did give 1 dose of 200 mg ibuprofen orally with an IV fluid hydration.    Critical Care time:  none               Labs Ordered and Resulted from Time of ED Arrival Up to the Time of Departure from the ED   ROUTINE UA WITH MICROSCOPIC - Abnormal; Notable for the following:     Leukocyte Esterase Urine Moderate (*)     WBC Urine 20 (*)     All other components within normal limits   CBC WITH PLATELETS DIFFERENTIAL - Abnormal; Notable for the following:     RBC Count 3.22 (*)     Hematocrit 33.7 (*)      (*)     MCH 37.0 (*)     Absolute Lymphocytes 0.5 (*)     All other components within normal limits   COMPREHENSIVE METABOLIC PANEL - Abnormal; Notable for the following:     Potassium 3.2 (*)     Albumin 3.2 (*)     All other components within normal limits   HCG QUAL URINE POCT - Normal   PERIPHERAL IV CATHETER   URINE CULTURE AEROBIC BACTERIAL   URINE CULTURE AEROBIC BACTERIAL     Results for orders placed or performed during the hospital encounter of 02/04/17   US Renal Transplant    Narrative    EXAMINATION: US RENAL TRANSPLANT, 2/4/2017 12:35 PM     COMPARISON: 12/28/2015    HISTORY: Right lower quadrant pain, UTI symptoms    TECHNIQUE:  Grey-scale, color Doppler and spectral flow analysis.    FINDINGS:  The transplant kidney  is located right lower quadrant, and measures 11  cm length. Parenchyma is of normal thickness and echogenicity.  No  hydronephrosis. No perinephric fluid collection. Again seen superior  pole simple renal cyst measuring up to 8 mm.    Renal Vein Flow:  28 cm/sec at hilum.   71 cm/sec at anastomosis.    Upper Pole Arcuate artery:  0.63 resistive index.    Mid pole Arcuate artery:  0.59 resistive index.     Lower Pole Arcuate artery:  0.61 resistive index.     Renal artery flow:   88 cm/sec peak systolic at hilum.  161 cm/sec peak systolic at anastomosis.    Iliac artery flow:  87 cm/sec peak systolic above anastomosis.  42 cm/sec peak systolic below anastomosis.    Iliac vein is patent above and below the anastomosis..      Impression    IMPRESSION:  No significant change since 12/28/2015. Normal grayscale appearance of  the transplant kidney with patent Doppler evaluation.    I have personally reviewed the examination and initial interpretation  and I agree with the findings.    ALENA KNIGHT MD   Routine UA with microscopic   Result Value Ref Range    Color Urine Straw     Appearance Urine Clear     Glucose Urine Negative NEG mg/dL    Bilirubin Urine Negative NEG    Ketones Urine Negative NEG mg/dL    Specific Gravity Urine 1.003 1.003 - 1.035    Blood Urine Negative NEG    pH Urine 6.0 5.0 - 7.0 pH    Protein Albumin Urine Negative NEG mg/dL    Urobilinogen mg/dL Normal 0.0 - 2.0 mg/dL    Nitrite Urine Negative NEG    Leukocyte Esterase Urine Moderate (A) NEG    Source Midstream Urine     WBC Urine 20 (H) 0 - 2 /HPF    RBC Urine 1 0 - 2 /HPF    Squamous Epithelial /HPF Urine 1 0 - 1 /HPF   CBC with platelets differential   Result Value Ref Range    WBC 7.3 4.0 - 11.0 10e9/L    RBC Count 3.22 (L) 3.8 - 5.2 10e12/L    Hemoglobin 11.9 11.7 - 15.7 g/dL    Hematocrit 33.7 (L) 35.0 - 47.0 %     (H) 78 - 100 fl    MCH 37.0 (H) 26.5 - 33.0 pg    MCHC 35.3 31.5 - 36.5 g/dL    RDW 13.2 10.0 - 15.0 %    Platelet  Count 229 150 - 450 10e9/L    Diff Method Automated Method     % Neutrophils 80.9 %    % Lymphocytes 6.3 %    % Monocytes 12.0 %    % Eosinophils 0.3 %    % Basophils 0.1 %    % Immature Granulocytes 0.4 %    Nucleated RBCs 0 0 /100    Absolute Neutrophil 5.9 1.6 - 8.3 10e9/L    Absolute Lymphocytes 0.5 (L) 0.8 - 5.3 10e9/L    Absolute Monocytes 0.9 0.0 - 1.3 10e9/L    Absolute Eosinophils 0.0 0.0 - 0.7 10e9/L    Absolute Basophils 0.0 0.0 - 0.2 10e9/L    Abs Immature Granulocytes 0.0 0 - 0.4 10e9/L    Absolute Nucleated RBC 0.0    Comprehensive metabolic panel   Result Value Ref Range    Sodium 137 133 - 144 mmol/L    Potassium 3.2 (L) 3.4 - 5.3 mmol/L    Chloride 104 94 - 109 mmol/L    Carbon Dioxide 26 20 - 32 mmol/L    Anion Gap 7 3 - 14 mmol/L    Glucose 76 70 - 99 mg/dL    Urea Nitrogen 17 7 - 30 mg/dL    Creatinine 0.94 0.52 - 1.04 mg/dL    GFR Estimate 65 >60 mL/min/1.7m2    GFR Estimate If Black 79 >60 mL/min/1.7m2    Calcium 8.6 8.5 - 10.1 mg/dL    Bilirubin Total 0.5 0.2 - 1.3 mg/dL    Albumin 3.2 (L) 3.4 - 5.0 g/dL    Protein Total 7.8 6.8 - 8.8 g/dL    Alkaline Phosphatase 98 40 - 150 U/L    ALT 33 0 - 50 U/L    AST 38 0 - 45 U/L   hCG qual urine POCT   Result Value Ref Range    HCG Qual Urine neg neg    Internal QC OK Yes    Urine Culture Aerobic Bacterial   Result Value Ref Range    Specimen Description Midstream Urine     Special Requests Specimen received in preservative     Culture Micro Pending     Micro Report Status Pending        Assessments & Plan (with Medical Decision Making)  41-year-old female history of renal transplant aproximaly  7 years ago presented with dysuria frequency this morning pain over her transplant UTI was diagnosed initially patient spiked a fever did receive Cipro 400 mg IV blood culture sent persistent fever Tylenol given initially IV fluid hydration ongoing symptoms patient given 1 dose of ibuprofen 200 mg orally after adequate hydration and cleared by nephrology.   Renal ultrasound reveals no changes.  CT scan noncontrast was ordered patient did go to the floor initially but will have a CT scan done to make sure there is nothing else going on such as a appendicitis          I have reviewed the nursing notes.    I have reviewed the findings, diagnosis, plan and need for follow up with the patient.    Current Discharge Medication List          Final diagnoses:   Pyelonephritis   Kidney replaced by transplant   Fever, unspecified   RLQ abdominal pain     I, Arnaud Adams, am serving as a trained medical scribe to document services personally performed by Tian Rojas MD, based on the provider's statements to me.      I, Tian Rojas MD, was physically present and have reviewed and verified the accuracy of this note documented by Arnaud Adams.    2/4/2017   [facility], [place], EMERGENCY DEPARTMENT      This note was created at least in part by the use of dragon voice dictation system. Inadvertent typographical errors may still exist.  Tian Rojas MD.        Tian Rojas MD  02/04/17 1925

## 2017-02-04 NOTE — LETTER
Transition Communication Hand-off for Care Transitions to Next Level of Care Provider    Name: Nena Underwood  MRN #: [id number]  Primary Care Provider: Jorge L Tucker     Primary Clinic: [address] [address]     Reason for Hospitalization:  Pyelonephritis [N12]  Admit Date/Time: 2/4/2017 10:22 AM  Discharge Date: 2/6/2017    Payor Source: Payor: BCBS / Plan: [place]BS [place] / Product Type: Indemnity /            Reason for Communication Hand-off Referral: Other Continuity of care    Discharge Plan:       Concern for non-adherence with plan of care:   No      Follow-up specialty is recommended: Yes    Follow-up plan:  Future Appointments  Date Time Provider Department Center   2/14/2017 8:15 AM EC LAB ECLAB EC   2/14/2017 8:30 AM ECMA1 ECMAM EC   6/20/2017 3:40 PM Walter Chong MD [facility]       Carmen Lopez  RN Care Coordinator  [phone]    AVS/Discharge Summary is the source of truth; this is a helpful guide for improved communication of patient story

## 2017-02-04 NOTE — IP AVS SNAPSHOT
MRN:[id number]                      After Visit Summary   2/4/2017    Nena Underwood    MRN: [id number]           Thank you!     Thank you for choosing [facility] for your care. Our goal is always to provide you with excellent care. Hearing back from our patients is one way we can continue to improve our services. Please take a few minutes to complete the written survey that you may receive in the mail after you visit with us. Thank you!        Patient Information     Date Of Birth          1975        About your hospital stay     You were admitted on:  February 4, 2017 You last received care in the:  [facility]    You were discharged on:  February 6, 2017        Reason for your hospital stay       You were admitted with pyelonephritis, a type of UTI that includes your kidney. Antibiotics are treating the infection                  Who to Call     For medical emergencies, please call 911.  For non-urgent questions about your medical care, please call your primary care provider or clinic, [phone]          Attending Provider     Provider    Tian Rojas MD Torok, MD Jerry Rajan Briar, MD       Primary Care Provider Office Phone # Fax #    Jorge L Tucker [phone]       [address] [address]         When to contact your care team       Call your primary doctor if you have any of the following: temperature greater than 100.7.                  After Care Instructions     Diet       Follow this diet upon discharge: Orders Placed This Encounter  Combination Diet Regular Diet Adult                  Follow-up Appointments     Adult [facility] Follow-up and recommended labs and tests       Follow up with primary care provider, Jorge L Tucker, within 14 days, for hospital follow- up. No follow up labs or test are needed.     Appointments on [place] and/or [facility] (with [facility] or [facility] provider  or service). Call [phone] if you haven't heard regarding these appointments within 7 days of discharge.                  Your next 10 appointments already scheduled     Feb 14, 2017  8:15 AM   LAB with EC LAB   [facility] ([facility])    [address] [place] [id number] [phone]           OUTSIDE LABS:  Please include name of facility and Physician that is requesting outside labs be drawn.  Please indicate if labs are fasting or non-fasting on appt notes.  Be as specific as you can on which labs are being drawn.            Feb 14, 2017  8:30 AM   MA SCREENING DIGITAL BILATERAL with ECMA1   [facility] ([facility])    [address] [place] [id number] [phone]           Do not use any powder, lotion or deodorant under your arms or on your breast. If you do, we will ask you to remove it before your exam.  Wear comfortable, two-piece clothing.  If you have any allergies, tell your care team.  Bring any previous mammograms from other facilities or have them mailed to the breast center.            Jun 20, 2017  3:40 PM   (Arrive by 3:25 PM)   Return Kidney Transplant with Walter Chong MD   [facility] ([facility])    [address] [place] [id number] [phone]              Pending Results     No orders found from 2/3/2017 to 2/5/2017.            Statement of Approval     Ordered          02/06/17 1155  I have reviewed and agree with all the recommendations and orders detailed in this document.   EFFECTIVE NOW     Approved and electronically signed by:  Leif Edwards MD             Admission Information        Provider Department Dept Phone    2/4/2017 Leif Edwards MD Uu U7a [phone]      Your Vitals Were     Blood Pressure Pulse Temperature Respirations    112/74 mmHg 67 98.1  F (36.7  C) (Oral) 16    Height Weight  "BMI (Body Mass Index) Pulse Oximetry    1.499 m (4' 11\") 44.543 kg (98 lb 3.2 oz) 19.82 kg/m2 99%    Last Period             01/27/2017         [employer] Information     [employer] gives you secure access to your electronic health record. If you see a primary care provider, you can also send messages to your care team and make appointments. If you have questions, please call your primary care clinic.  If you do not have a primary care provider, please call [phone] and they will assist you.        Care EveryWhere ID     This is your Care EveryWhere ID. This could be used by other organizations to access your [facility] medical records  [id number]           Review of your medicines      START taking        Dose / Directions    ciprofloxacin 500 MG tablet   Commonly known as:  CIPRO   Used for:  Pyelonephritis        Dose:  500 mg   Take 1 tablet (500 mg) by mouth 2 times daily for 12 days   Quantity:  24 tablet   Refills:  0       phenazopyridine 100 MG tablet   Commonly known as:  PYRIDIUM   Used for:  Pyelonephritis        Dose:  100 mg   Take 1 tablet (100 mg) by mouth 3 times daily as needed for urinary tract discomfort   Quantity:  24 tablet   Refills:  1         CONTINUE these medicines which have NOT CHANGED        Dose / Directions    azaTHIOprine 50 MG tablet   Commonly known as:  IMURAN   Used for:  Kidney replaced by transplant        Dose:  100 mg   Take 2 tablets (100 mg) by mouth daily   Quantity:  180 tablet   Refills:  3       predniSONE 5 MG tablet   Commonly known as:  DELTASONE   Used for:  Kidney transplanted        TAKE ONE TABLET BY MOUTH EVERY DAY   Quantity:  90 tablet   Refills:  3       sulfamethoxazole-trimethoprim 400-80 MG per tablet   Commonly known as:  BACTRIM/SEPTRA   Indication:  PJP ppx   Used for:  Immunosuppressed status (H)        Dose:  1 tablet   Take 1 tablet by mouth daily   Quantity:  90 tablet   Refills:  3       tacrolimus capsule   Used for:  Kidney transplanted        " Dose:  0.5 mg   Take 1 capsule (0.5 mg) by mouth 2 times daily   Quantity:  60 capsule   Refills:  11       TYLENOL PO        Dose:  500 mg   Take 500 mg by mouth every 4 hours as needed for mild pain or fever   Refills:  0            Where to get your medicines      These medications were sent to [facility] - [place], [place] - [address]  [address], [facility] [address]     Phone:  [phone]    - ciprofloxacin 500 MG tablet  - phenazopyridine 100 MG tablet             Protect others around you: Learn how to safely use, store and throw away your medicines at www.disposemymeds.org.             Medication List: This is a list of all your medications and when to take them. Check marks below indicate your daily home schedule. Keep this list as a reference.      Medications           Morning Afternoon Evening Bedtime As Needed    azaTHIOprine 50 MG tablet   Commonly known as:  IMURAN   Take 2 tablets (100 mg) by mouth daily   Last time this was given:  100 mg on 2/6/2017  8:49 AM                                ciprofloxacin 500 MG tablet   Commonly known as:  CIPRO   Take 1 tablet (500 mg) by mouth 2 times daily for 12 days                                phenazopyridine 100 MG tablet   Commonly known as:  PYRIDIUM   Take 1 tablet (100 mg) by mouth 3 times daily as needed for urinary tract discomfort   Last time this was given:  100 mg on 2/6/2017  8:49 AM                                predniSONE 5 MG tablet   Commonly known as:  DELTASONE   TAKE ONE TABLET BY MOUTH EVERY DAY   Last time this was given:  5 mg on 2/6/2017  8:49 AM                                sulfamethoxazole-trimethoprim 400-80 MG per tablet   Commonly known as:  BACTRIM/SEPTRA   Take 1 tablet by mouth daily   Last time this was given:  1 tablet on 2/6/2017  8:49 AM                                tacrolimus capsule   Take 1 capsule (0.5 mg) by mouth 2 times daily   Last time this was given:  0.5 mg on 2/6/2017   8:49 AM                                TYLENOL PO   Take 500 mg by mouth every 4 hours as needed for mild pain or fever   Last time this was given:  975 mg on 2/6/2017  4:24 AM

## 2017-02-04 NOTE — LETTER
February 6, 2017      Nena Underwood  [address]   [place] [address]              To whom it may concern,    Nena Underwood was a patient at the [facility] until 2/6/16. She is excused from work through 2/7/16      Sincerely,      Leif Edwards MD

## 2017-02-05 LAB
ANION GAP SERPL CALCULATED.3IONS-SCNC: 10 MMOL/L (ref 3–14)
BACTERIA SPEC CULT: ABNORMAL
BACTERIA SPEC CULT: NORMAL
BUN SERPL-MCNC: 18 MG/DL (ref 7–30)
CALCIUM SERPL-MCNC: 7.5 MG/DL (ref 8.5–10.1)
CHLORIDE SERPL-SCNC: 111 MMOL/L (ref 94–109)
CO2 SERPL-SCNC: 19 MMOL/L (ref 20–32)
CREAT SERPL-MCNC: 1.12 MG/DL (ref 0.52–1.04)
ERYTHROCYTE [DISTWIDTH] IN BLOOD BY AUTOMATED COUNT: 13.5 % (ref 10–15)
GFR SERPL CREATININE-BSD FRML MDRD: 53 ML/MIN/1.7M2
GLUCOSE SERPL-MCNC: 120 MG/DL (ref 70–99)
HCT VFR BLD AUTO: 31.5 % (ref 35–47)
HGB BLD-MCNC: 10.8 G/DL (ref 11.7–15.7)
Lab: ABNORMAL
MCH RBC QN AUTO: 36.5 PG (ref 26.5–33)
MCHC RBC AUTO-ENTMCNC: 34.3 G/DL (ref 31.5–36.5)
MCV RBC AUTO: 106 FL (ref 78–100)
MICRO REPORT STATUS: ABNORMAL
MICRO REPORT STATUS: NORMAL
MICROORGANISM SPEC CULT: ABNORMAL
PLATELET # BLD AUTO: 185 10E9/L (ref 150–450)
POTASSIUM SERPL-SCNC: 3.9 MMOL/L (ref 3.4–5.3)
RBC # BLD AUTO: 2.96 10E12/L (ref 3.8–5.2)
SODIUM SERPL-SCNC: 141 MMOL/L (ref 133–144)
SPECIMEN SOURCE: ABNORMAL
SPECIMEN SOURCE: NORMAL
WBC # BLD AUTO: 10.6 10E9/L (ref 4–11)

## 2017-02-05 PROCEDURE — 25000131 ZZH RX MED GY IP 250 OP 636 PS 637: Performed by: NURSE PRACTITIONER

## 2017-02-05 PROCEDURE — 36415 COLL VENOUS BLD VENIPUNCTURE: CPT | Performed by: NURSE PRACTITIONER

## 2017-02-05 PROCEDURE — 25000128 H RX IP 250 OP 636: Performed by: NURSE PRACTITIONER

## 2017-02-05 PROCEDURE — 85027 COMPLETE CBC AUTOMATED: CPT | Performed by: NURSE PRACTITIONER

## 2017-02-05 PROCEDURE — 25000132 ZZH RX MED GY IP 250 OP 250 PS 637: Performed by: NURSE PRACTITIONER

## 2017-02-05 PROCEDURE — 80048 BASIC METABOLIC PNL TOTAL CA: CPT | Performed by: NURSE PRACTITIONER

## 2017-02-05 PROCEDURE — 99233 SBSQ HOSP IP/OBS HIGH 50: CPT | Performed by: INTERNAL MEDICINE

## 2017-02-05 PROCEDURE — 25000128 H RX IP 250 OP 636: Performed by: STUDENT IN AN ORGANIZED HEALTH CARE EDUCATION/TRAINING PROGRAM

## 2017-02-05 PROCEDURE — 12000025 ZZH R&B TRANSPLANT INTERMEDIATE

## 2017-02-05 PROCEDURE — 25000125 ZZHC RX 250: Performed by: NURSE PRACTITIONER

## 2017-02-05 RX ADMIN — ACETAMINOPHEN 975 MG: 325 TABLET, FILM COATED ORAL at 10:52

## 2017-02-05 RX ADMIN — SODIUM CHLORIDE 1000 ML: 9 INJECTION, SOLUTION INTRAVENOUS at 02:15

## 2017-02-05 RX ADMIN — PHENAZOPYRIDINE 100 MG: 100 TABLET ORAL at 13:26

## 2017-02-05 RX ADMIN — TACROLIMUS 0.5 MG: 0.5 CAPSULE, GELATIN COATED ORAL at 08:30

## 2017-02-05 RX ADMIN — AZATHIOPRINE 100 MG: 50 TABLET ORAL at 08:30

## 2017-02-05 RX ADMIN — SODIUM CHLORIDE: 9 INJECTION, SOLUTION INTRAVENOUS at 10:52

## 2017-02-05 RX ADMIN — PHENAZOPYRIDINE 100 MG: 100 TABLET ORAL at 08:30

## 2017-02-05 RX ADMIN — SULFAMETHOXAZOLE AND TRIMETHOPRIM 1 TABLET: 400; 80 TABLET ORAL at 08:30

## 2017-02-05 RX ADMIN — TACROLIMUS 0.5 MG: 0.5 CAPSULE, GELATIN COATED ORAL at 19:41

## 2017-02-05 RX ADMIN — PREDNISONE 5 MG: 5 TABLET ORAL at 08:30

## 2017-02-05 RX ADMIN — CIPROFLOXACIN 400 MG: 2 INJECTION, SOLUTION INTRAVENOUS at 08:28

## 2017-02-05 RX ADMIN — CIPROFLOXACIN 400 MG: 2 INJECTION, SOLUTION INTRAVENOUS at 19:41

## 2017-02-05 RX ADMIN — SODIUM CHLORIDE: 9 INJECTION, SOLUTION INTRAVENOUS at 01:39

## 2017-02-05 RX ADMIN — PHENAZOPYRIDINE 100 MG: 100 TABLET ORAL at 17:56

## 2017-02-05 NOTE — PROGRESS NOTES
Pt arrived to 7A from ED at 1845. Temp upon arrival 103.1; cold packs applied under arms and fan set up in room. Awaiting verification of orders for medication. Other VSS on RA.Sepsis protocol triggered; Lactic ordered.  C/o pain in ride side of abdomen; pt has heat pack applied to this site. Pt threw up 200 mL immediately upon arrival to room. States she does not prefer to take medication to suppress nausea. Pt currently resting in room; will pass on report to oncoming RN.     Admitting MD notified of sepsis protocol trigger.

## 2017-02-05 NOTE — PLAN OF CARE
Problem: Goal Outcome Summary  Goal: Goal Outcome Summary  Outcome: No Change  1900 - 0700. BPs soft, 86/52. Cross cover MD notifed, 1L NS bolus ordered. BP recheck 92/65. Per patient, baseline is 100's/70's. OVSS on RA. Up independently. Denies pain. Pt had nausea and emesis on previous shift; so premedicated with IV zofran before pt ate courtesy meal. Denies nausea. PIV with NS @ 100ml/hr. Voiding, no BM this shift. Pt denies any sx of burning, itching, urgency or frequency. Will continue to monitor and notify of any changes.

## 2017-02-05 NOTE — PROGRESS NOTES
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    Pyelonephritis in transplanted kidney: She felt better this AM then spiked another fever around midday, so continue IV ciprofloxacin for now. Await cultures/susceptibilities. She is eating and drinking well, so decrease IVF to 50 mL/hr. Tylenol PRN    H/O renal transplant 2010: continue tacrolimus, azathioprine, prednisone, and TMP/sulfa. Daily creatinine--1.12 today      CODE: full  DVT: ambulate  Diet/fluids: regular, decreasing IVF   Disposition: await 24 hour fever free      Pt's care was discussed with bedside RN and patient during Care Team Rounds.    Leif Edwards  Internal Medicine Staff Hospitalist Service   [facility]   Pager: [phone]    Team: Medicine Gold 8  Page Cross Cover after 5 pm: pager [phone]     ___________________________________________________________________    Subjective & Interval Hx:  She vomited when she first arrived on the floor yesterday but is not nauseated this AM. She ate breakfast well today. Her abdominal pain is improved but not gone completely. She feels basically like her normal self. She has been walking around the room.     Update at about 11:45--now with fever and doesn't feel well    Last 24 hr care team notes reviewed.   ROS:  4 point ROS including Respiratory, CV, GI and , other than that noted in the HPI, is negative      Medications: Reviewed in EPIC. List below for reference    Physical Exam:    Blood pressure 105/79, pulse 86, temperature 101.2  F (38.4  C), temperature source Oral, resp. rate 14, height 1.499 m (4' 11\"), weight 41.731 kg (92 lb), last menstrual period 01/27/2017, " SpO2 100 %, not currently breastfeeding.  General appearance: in no apparent distress and well developed and well nourished.  Neck: supple  CV: regular rate and rhythm and normal S1 S2  Resp: clear to ausculation bilaterally, normal respiratory effort  Abd: + BS s/nd, no masses, mildly tender in region of transplanted kidnet  Extr: WWP, no edema  Skin: warm and dry      Lines/Tubes:   Peripheral IV 02/04/17 Right Lower forearm (Active)   Site Assessment WDL 2/5/2017  8:00 AM   Line Status Infusing 2/5/2017  8:00 AM   Phlebitis Scale 0-->no symptoms 2/5/2017  8:00 AM   Infiltration Scale 0 2/5/2017 12:00 AM   Number of days:1       Labs & Studies of Note: I personally reviewed the following studies:  CBC, BMP   Unresulted Labs Ordered in the Past 30 Days of this Admission     Date and Time Order Name Status Description    2/4/2017 1028 Urine Culture Aerobic Bacterial Preliminary     2/2/2017 1524 HPV HIGH RISK TYPES DNA CERVICAL In process     2/2/2017 1524 PAP IMAGED THIN LAYER SCREEN In process           Medications list for Reference (delete if desired)  Current Facility-Administered Medications   Medication     azaTHIOprine (IMURAN) tablet 100 mg     predniSONE (DELTASONE) tablet 5 mg     tacrolimus (PROGRAF BRAND) capsule 0.5 mg     sulfamethoxazole-trimethoprim (BACTRIM/SEPTRA) 400-80 MG per tablet 1 tablet     naloxone (NARCAN) injection 0.1-0.4 mg     lidocaine 1 % 1 mL     lidocaine (LMX4) kit     sodium chloride (PF) 0.9% PF flush 3 mL     sodium chloride (PF) 0.9% PF flush 3 mL     0.9% sodium chloride infusion     acetaminophen (TYLENOL) tablet 975 mg     senna-docusate (SENOKOT-S;PERICOLACE) 8.6-50 MG per tablet 1-2 tablet     ondansetron (ZOFRAN-ODT) ODT tab 4 mg    Or     ondansetron (ZOFRAN) injection 4 mg     prochlorperazine (COMPAZINE) injection 5-10 mg    Or     prochlorperazine (COMPAZINE) tablet 5-10 mg    Or     prochlorperazine (COMPAZINE) Suppository 25 mg     ciprofloxacin (CIPRO) infusion  400 mg     phenazopyridine (PYRIDIUM) tablet 100 mg

## 2017-02-05 NOTE — PLAN OF CARE
Problem: Goal Outcome Summary  Goal: Goal Outcome Summary  Outcome: No Change  Assumed care of pt at 1100. VSS, T upon arrival 101.2 (Tylenol had been administered by previous nurse) with improvement to 99. Pt eager to go home, sad, tearful that she will need to stay another night to ensure that she remains afebrile and that creatinine improves. Voiding adequate amounts, no BM this shift. Denies any discomfort when urinating. and NS continues at 100mL/hr through PIV. Resting with call light in reach. Will continue to monitor and follow plan of care.

## 2017-02-06 ENCOUNTER — CARE COORDINATION (OUTPATIENT)
Dept: CARDIOLOGY | Facility: CLINIC | Age: 42
End: 2017-02-06

## 2017-02-06 VITALS
SYSTOLIC BLOOD PRESSURE: 112 MMHG | BODY MASS INDEX: 19.8 KG/M2 | TEMPERATURE: 98.1 F | HEART RATE: 67 BPM | RESPIRATION RATE: 16 BRPM | HEIGHT: 59 IN | OXYGEN SATURATION: 99 % | WEIGHT: 98.2 LBS | DIASTOLIC BLOOD PRESSURE: 74 MMHG

## 2017-02-06 LAB
ANION GAP SERPL CALCULATED.3IONS-SCNC: 8 MMOL/L (ref 3–14)
BUN SERPL-MCNC: 8 MG/DL (ref 7–30)
CALCIUM SERPL-MCNC: 7.9 MG/DL (ref 8.5–10.1)
CHLORIDE SERPL-SCNC: 112 MMOL/L (ref 94–109)
CO2 SERPL-SCNC: 20 MMOL/L (ref 20–32)
CREAT SERPL-MCNC: 0.92 MG/DL (ref 0.52–1.04)
GFR SERPL CREATININE-BSD FRML MDRD: 67 ML/MIN/1.7M2
GLUCOSE SERPL-MCNC: 79 MG/DL (ref 70–99)
POTASSIUM SERPL-SCNC: 3.5 MMOL/L (ref 3.4–5.3)
SODIUM SERPL-SCNC: 140 MMOL/L (ref 133–144)

## 2017-02-06 PROCEDURE — 36415 COLL VENOUS BLD VENIPUNCTURE: CPT | Performed by: INTERNAL MEDICINE

## 2017-02-06 PROCEDURE — 25000131 ZZH RX MED GY IP 250 OP 636 PS 637: Performed by: NURSE PRACTITIONER

## 2017-02-06 PROCEDURE — 80048 BASIC METABOLIC PNL TOTAL CA: CPT | Performed by: INTERNAL MEDICINE

## 2017-02-06 PROCEDURE — 99239 HOSP IP/OBS DSCHRG MGMT >30: CPT | Performed by: INTERNAL MEDICINE

## 2017-02-06 PROCEDURE — 25000132 ZZH RX MED GY IP 250 OP 250 PS 637: Performed by: NURSE PRACTITIONER

## 2017-02-06 PROCEDURE — 25000125 ZZHC RX 250: Performed by: NURSE PRACTITIONER

## 2017-02-06 PROCEDURE — 25000128 H RX IP 250 OP 636: Performed by: NURSE PRACTITIONER

## 2017-02-06 RX ORDER — PHENAZOPYRIDINE HYDROCHLORIDE 100 MG/1
100 TABLET, FILM COATED ORAL 3 TIMES DAILY PRN
Qty: 24 TABLET | Refills: 1 | Status: SHIPPED | OUTPATIENT
Start: 2017-02-06 | End: 2017-02-21

## 2017-02-06 RX ORDER — CIPROFLOXACIN 500 MG/1
500 TABLET, FILM COATED ORAL 2 TIMES DAILY
Qty: 24 TABLET | Refills: 0 | Status: SHIPPED | OUTPATIENT
Start: 2017-02-06 | End: 2017-02-18

## 2017-02-06 RX ADMIN — PREDNISONE 5 MG: 5 TABLET ORAL at 08:49

## 2017-02-06 RX ADMIN — CIPROFLOXACIN 400 MG: 2 INJECTION, SOLUTION INTRAVENOUS at 08:49

## 2017-02-06 RX ADMIN — PHENAZOPYRIDINE 100 MG: 100 TABLET ORAL at 08:49

## 2017-02-06 RX ADMIN — SULFAMETHOXAZOLE AND TRIMETHOPRIM 1 TABLET: 400; 80 TABLET ORAL at 08:49

## 2017-02-06 RX ADMIN — ACETAMINOPHEN 975 MG: 325 TABLET, FILM COATED ORAL at 04:24

## 2017-02-06 RX ADMIN — TACROLIMUS 0.5 MG: 0.5 CAPSULE, GELATIN COATED ORAL at 08:49

## 2017-02-06 RX ADMIN — AZATHIOPRINE 100 MG: 50 TABLET ORAL at 08:49

## 2017-02-06 NOTE — PLAN OF CARE
Problem: Goal Outcome Summary  Goal: Goal Outcome Summary  Outcome: No Change  1900 - 0700. Tmax 99.8. BPs soft. HR 50's - 60's. OVSS on RA. Up independently. Pt c/o back pain, Tylenol given x1 and hot packs applied. Denies nausea. PIV with NS @ 100ml/hr. Voiding, no BM reported this shift. Possible discharge today. Will continue to monitor and notify of any changes.

## 2017-02-06 NOTE — PLAN OF CARE
Problem: Goal Outcome Summary  Goal: Goal Outcome Summary  Outcome: No Change  6909-3145: Afebrile. VSS on RA. Pt denies pain. Pt tolerating regular diet, denies nausea. PIV R Forearm infusing with  mL/hr. Voiding adequate amounts, putting out 600-650 mL each time. No BM this shift, pt passing flatus. Up independently. Possible discharge home tomorrow. Call light in reach. Will continue to monitor and follow plan of care.

## 2017-02-06 NOTE — DISCHARGE SUMMARY
"  [facility] - Internal Medicine Discharge Summary   Date of Service: 2/6/2017    Nena Underwood MRN# [id number]   YOB: 1975 Age: 41 year old     Date of Admission:  2/4/2017  Date of Discharge:  2/6/2017  2:07 PM  Admitting Physician:  Margy Lindsey MD  Discharge Physician:  Leif Edwards MD   Discharging Service:  Internal Medicine, [facility]     Primary Provider: Jorge L Tucker         Reason for Admission:   Nena Underwood is a 41 year old female s/p renal transplant in 2010 and s/p treatment for latent TB here admitted with urinary frequency and dysuria found to have pyelonephritis in transplanted kidney         Procedures & Significant Findings:   None         Consultations:   None         Hospital Course by Problem:    Pyelonephrtis in transplanted kidney: she was started on ciprofloxacin and IVF and gradually improved. Her creatinine briefly increased to 1.12 then returned to normal.  Urine culture was pending at time of discharge, later returned as Klebsiella susceptible to cipro. Continued her on her home tacrolimus, azathioprine, prednisone, and TMP/sulfa.       Physical Exam on day of Discharge:  Blood pressure 112/74, pulse 67, temperature 98.2  F (36.8  C), temperature source Oral, resp. rate 16, height 1.499 m (4' 11\"), weight 44.543 kg (98 lb 3.2 oz), last menstrual period 01/27/2017, SpO2 99 %, not currently breastfeeding.  General appearance: in no apparent distress and well developed and well nourished.  Neck: supple  CV: regular rate and rhythm and normal S1 S2  Resp: clear to ausculation bilaterally, normal respiratory effort  Abd: + BS s/nt/nd, no masses  Extr: WWP, no edema  Skin: warm and dry      Lines/Tubes/Drains:   Peripheral IV 02/04/17 Right Lower forearm (Active)   Site Assessment WDL 2/6/2017  8:00 AM   Line Status Infusing 2/6/2017  8:00 AM   Phlebitis Scale 0-->no symptoms 2/5/2017  4:00 PM   Infiltration Scale 0 2/5/2017  4:00 PM   Extravasation? No 2/5/2017 "  4:00 PM   Number of days:2              Pending Results:   None         Discharge Medications:     Current Discharge Medication List      START taking these medications    Details   phenazopyridine (PYRIDIUM) 100 MG tablet Take 1 tablet (100 mg) by mouth 3 times daily as needed for urinary tract discomfort  Qty: 24 tablet, Refills: 1    Associated Diagnoses: Pyelonephritis      ciprofloxacin (CIPRO) 500 MG tablet Take 1 tablet (500 mg) by mouth 2 times daily for 12 days  Qty: 24 tablet, Refills: 0    Associated Diagnoses: Pyelonephritis         CONTINUE these medications which have NOT CHANGED    Details   predniSONE (DELTASONE) 5 MG tablet TAKE ONE TABLET BY MOUTH EVERY DAY  Qty: 90 tablet, Refills: 3    Associated Diagnoses: Kidney transplanted      PROGRAF 0.5 MG PO CAPSULE Take 1 capsule (0.5 mg) by mouth 2 times daily  Qty: 60 capsule, Refills: 11    Associated Diagnoses: Kidney transplanted      azaTHIOprine (IMURAN) 50 MG tablet Take 2 tablets (100 mg) by mouth daily  Qty: 180 tablet, Refills: 3    Associated Diagnoses: Kidney replaced by transplant      sulfamethoxazole-trimethoprim (BACTRIM,SEPTRA) 400-80 MG per tablet Take 1 tablet by mouth daily  Qty: 90 tablet, Refills: 3    Associated Diagnoses: Immunosuppressed status (H)      Acetaminophen (TYLENOL PO) Take 500 mg by mouth every 4 hours as needed for mild pain or fever                  Discharge Instructions and Follow-Up:     Discharge Procedure Orders  Reason for your hospital stay   Order Comments: You were admitted with pyelonephritis, a type of UTI that includes your kidney. Antibiotics are treating the infection     Adult [facility] Follow-up and recommended labs and tests   Order Comments: Follow up with primary care provider, Jorge L Tucker, within 14 days, for hospital follow- up. No follow up labs or test are needed.     Appointments on [place] and/or [facility] (with [facility] or [facility] provider or service). Call [phone] if you haven't  heard regarding these appointments within 7 days of discharge.     When to contact your care team   Order Comments: Call your primary doctor if you have any of the following: temperature greater than 100.7.     Diet   Order Comments: Follow this diet upon discharge: Orders Placed This Encounter  Combination Diet Regular Diet Adult   Order Specific Question Answer Comments   Is discharge order? Yes                  Discharge Disposition:   Improved, disharged home in stable condition         Condition on Discharge:   Discharge condition: Stable   Code status on discharge: Full Code        Date of service: 2/6/2017     35 minutes spent in discharge, including >50% in counseling and coordination of care, medication review and plan of care recommended on follow up. Questions were answered.      It was our pleasure to care for Pamela during her hospitalization. Please do not hesitate to contact me should there be questions regarding the hospital course or discharge plan.      Leif Edwards  Internal Medicine Hospitalist & Staff Physician  [facility]  Pager: [phone]

## 2017-02-06 NOTE — PLAN OF CARE
Problem: Patient Care Overview (Adult)  Goal: Plan of Care Review  Outcome: Adequate for Discharge Date Met:  02/06/17  Discharged home after picking up oral antibiotic. See orders.

## 2017-02-06 NOTE — PROGRESS NOTES
Patient is a transplant patient and will be followed by the transplant team for follow up      Reason for your hospital stay    Order Comments:  You were admitted with pyelonephritis, a type of UTI that includes your kidney.                Jun 20, 2017  3:40 PM   (Arrive by 3:25 PM)   Return Kidney Transplant with Walter Chong MD   [facility] Nephrology ([facility])

## 2017-02-07 ENCOUNTER — CARE COORDINATION (OUTPATIENT)
Dept: CARE COORDINATION | Facility: CLINIC | Age: 42
End: 2017-02-07

## 2017-02-07 LAB
COPATH REPORT: NORMAL
PAP: NORMAL

## 2017-02-09 PROBLEM — Z12.4 CERVICAL CANCER SCREENING: Status: ACTIVE | Noted: 2017-02-09

## 2017-02-09 LAB
FINAL DIAGNOSIS: NORMAL
HPV HR 12 DNA CVX QL NAA+PROBE: NEGATIVE
HPV16 DNA SPEC QL NAA+PROBE: NEGATIVE
HPV18 DNA SPEC QL NAA+PROBE: NEGATIVE
SPECIMEN DESCRIPTION: NORMAL

## 2017-02-14 ENCOUNTER — RADIANT APPOINTMENT (OUTPATIENT)
Dept: MAMMOGRAPHY | Facility: CLINIC | Age: 42
End: 2017-02-14
Attending: FAMILY MEDICINE
Payer: COMMERCIAL

## 2017-02-14 DIAGNOSIS — Z00.00 ROUTINE GENERAL MEDICAL EXAMINATION AT A HEALTH CARE FACILITY: ICD-10-CM

## 2017-02-14 DIAGNOSIS — Z12.39 BREAST CANCER SCREENING: ICD-10-CM

## 2017-02-14 LAB
CHOLEST SERPL-MCNC: 118 MG/DL
GLUCOSE SERPL-MCNC: 80 MG/DL (ref 70–99)
HDLC SERPL-MCNC: 53 MG/DL
LDLC SERPL CALC-MCNC: 50 MG/DL
NONHDLC SERPL-MCNC: 65 MG/DL
TRIGL SERPL-MCNC: 74 MG/DL

## 2017-02-14 PROCEDURE — 80061 LIPID PANEL: CPT | Performed by: FAMILY MEDICINE

## 2017-02-14 PROCEDURE — G0202 SCR MAMMO BI INCL CAD: HCPCS | Mod: TC

## 2017-02-14 PROCEDURE — 36415 COLL VENOUS BLD VENIPUNCTURE: CPT | Performed by: FAMILY MEDICINE

## 2017-02-14 PROCEDURE — 82947 ASSAY GLUCOSE BLOOD QUANT: CPT | Performed by: FAMILY MEDICINE

## 2017-02-20 ENCOUNTER — HOSPITAL ENCOUNTER (EMERGENCY)
Facility: CLINIC | Age: 42
Discharge: HOME | End: 2017-02-21
Attending: EMERGENCY MEDICINE | Admitting: EMERGENCY MEDICINE
Payer: COMMERCIAL

## 2017-02-20 DIAGNOSIS — N30.01 ACUTE CYSTITIS WITH HEMATURIA: ICD-10-CM

## 2017-02-20 PROCEDURE — 99284 EMERGENCY DEPT VISIT MOD MDM: CPT | Mod: Z6 | Performed by: EMERGENCY MEDICINE

## 2017-02-20 PROCEDURE — 99283 EMERGENCY DEPT VISIT LOW MDM: CPT

## 2017-02-20 PROCEDURE — 36415 COLL VENOUS BLD VENIPUNCTURE: CPT

## 2017-02-20 NOTE — ED AVS SNAPSHOT
[facility], Emergency Department    [address] [facility] [address]    Phone:  [phone]                                       Nena Underwood   MRN: [id number]    Department:  [facility], Emergency Department   Date of Visit:  2/20/2017           Patient Information     Date Of Birth          1975        Your diagnoses for this visit were:     Acute cystitis with hematuria        You were seen by Rafa Mims MD.        Discharge Instructions       Please make an appointment to follow up with Your Primary Care Provider in 1-2 days        Understanding Urinary Tract Infections (UTIs)  Most UTIs are caused by bacteria, although they may also be caused by viruses or fungi. Bacteria from the bowel are the most common source of infection. The infection may begin because of any of the following:    Sexual activity. During sex, germs can travel from the penis, vagina, or rectum into the urethra.     Germs on the skin outside the rectum may travel into the urethra. This is more common in women since the rectum and urethra are closer to each other than in men. Wiping from front to back after using the toilet and keeping the area clean can help prevent germs from getting to the urethra.    Blockage of urine flow through the urinary tract. If urine sits too long, germs may begin to grow out of control.      Parts of the urinary tract  The infection can occur in any part of the urinary tract.    The kidneys collect and store urine.    The ureters carry urine from the kidneys to the bladder.    The bladder holds urine until you are ready to let it out.    The urethra carries urine from the bladder out of the body. It is shorter in women, so bacteria can move through it more easily. The urethra is longer in men, so a UTI is less likely to reach the bladder or kidneys in men.    3503-2710 [employer]. [address] [place] [address], [place] [address]. All rights reserved. This information is not  intended as a substitute for professional medical care. Always follow your healthcare professional's instructions.           Future Appointments        Provider Department Dept Phone Center    6/20/2017 3:40 PM Walter Chong MD [facility] Nephrology [phone] [facility]      24 Hour Appointment Hotline       To make an appointment at any [facility], call [phone] ([phone]). If you don't have a family doctor or clinic, we will help you find one. [facility] are conveniently located to serve the needs of you and your family.             Review of your medicines      START taking        Dose / Directions Last dose taken    ciprofloxacin 250 MG tablet   Commonly known as:  CIPRO   Dose:  250 mg   Quantity:  14 tablet        Take 1 tablet (250 mg) by mouth 2 times daily for 7 days   Refills:  0          Our records show that you are taking the medicines listed below. If these are incorrect, please call your family doctor or clinic.        Dose / Directions Last dose taken    azaTHIOprine 50 MG tablet   Commonly known as:  IMURAN   Dose:  100 mg   Quantity:  180 tablet        Take 2 tablets (100 mg) by mouth daily   Refills:  3        phenazopyridine 100 MG tablet   Commonly known as:  PYRIDIUM   Dose:  100 mg   Quantity:  24 tablet        Take 1 tablet (100 mg) by mouth 3 times daily as needed for urinary tract discomfort   Refills:  1        predniSONE 5 MG tablet   Commonly known as:  DELTASONE   Quantity:  90 tablet        TAKE ONE TABLET BY MOUTH EVERY DAY   Refills:  3        sulfamethoxazole-trimethoprim 400-80 MG per tablet   Commonly known as:  BACTRIM/SEPTRA   Dose:  1 tablet   Indication:  PJP ppx   Quantity:  90 tablet        Take 1 tablet by mouth daily   Refills:  3        tacrolimus capsule   Dose:  0.5 mg   Quantity:  60 capsule        Take 1 capsule (0.5 mg) by mouth 2 times daily   Refills:  11        TYLENOL PO   Dose:  500 mg        Take 500 mg by mouth every 4 hours as  needed for mild pain or fever   Refills:  0                Prescriptions were sent or printed at these locations (1 Prescription)                   Other Prescriptions                Printed at Department/Unit printer (1 of 1)         ciprofloxacin (CIPRO) 250 MG tablet                Procedures and tests performed during your visit     Basic metabolic panel    CBC with platelets differential    Peripheral IV catheter    UA with Microscopic    Urine Culture      Orders Needing Specimen Collection     None      Pending Results     No orders found for last 3 day(s).            Pending Culture Results     No orders found for last 3 day(s).            Thank you for choosing [facility]       Thank you for choosing [facility] for your care. Our goal is always to provide you with excellent care. Hearing back from our patients is one way we can continue to improve our services. Please take a few minutes to complete the written survey that you may receive in the mail after you visit with us. Thank you!        [employer] Information     [employer] gives you secure access to your electronic health record. If you see a primary care provider, you can also send messages to your care team and make appointments. If you have questions, please call your primary care clinic.  If you do not have a primary care provider, please call [phone] and they will assist you.        Care EveryWhere ID     This is your Care EveryWhere ID. This could be used by other organizations to access your [facility] medical records  [id number]        After Visit Summary       This is your record. Keep this with you and show to your community pharmacist(s) and doctor(s) at your next visit.

## 2017-02-20 NOTE — ED AVS SNAPSHOT
[facility] [place], Emergency Department    [address] [place] [address]    Phone:  [phone]                                       Nena Underwood   MRN: [id number]    Department:  [facility], Emergency Department   Date of Visit:  2/20/2017           After Visit Summary Signature Page     I have received my discharge instructions, and my questions have been answered. I have discussed any challenges I see with this plan with the nurse or doctor.    ..........................................................................................................................................  Patient/Patient Representative Signature      ..........................................................................................................................................  Patient Representative Print Name and Relationship to Patient    ..................................................               ................................................  Date                                            Time    ..........................................................................................................................................  Reviewed by Signature/Title    ...................................................              ..............................................  Date                                                            Time

## 2017-02-21 VITALS
RESPIRATION RATE: 16 BRPM | OXYGEN SATURATION: 98 % | WEIGHT: 95 LBS | HEART RATE: 82 BPM | TEMPERATURE: 97.8 F | BODY MASS INDEX: 19.15 KG/M2 | DIASTOLIC BLOOD PRESSURE: 71 MMHG | SYSTOLIC BLOOD PRESSURE: 119 MMHG | HEIGHT: 59 IN

## 2017-02-21 LAB
ALBUMIN UR-MCNC: 10 MG/DL
ANION GAP SERPL CALCULATED.3IONS-SCNC: 10 MMOL/L (ref 3–14)
APPEARANCE UR: CLEAR
BACTERIA #/AREA URNS HPF: ABNORMAL /HPF
BASOPHILS # BLD AUTO: 0 10E9/L (ref 0–0.2)
BASOPHILS NFR BLD AUTO: 0.2 %
BILIRUB UR QL STRIP: NEGATIVE
BUN SERPL-MCNC: 19 MG/DL (ref 7–30)
CALCIUM SERPL-MCNC: 8.5 MG/DL (ref 8.5–10.1)
CHLORIDE SERPL-SCNC: 109 MMOL/L (ref 94–109)
CO2 SERPL-SCNC: 22 MMOL/L (ref 20–32)
COLOR UR AUTO: ABNORMAL
CREAT SERPL-MCNC: 0.89 MG/DL (ref 0.52–1.04)
DIFFERENTIAL METHOD BLD: ABNORMAL
EOSINOPHIL # BLD AUTO: 0 10E9/L (ref 0–0.7)
EOSINOPHIL NFR BLD AUTO: 0.2 %
ERYTHROCYTE [DISTWIDTH] IN BLOOD BY AUTOMATED COUNT: 13 % (ref 10–15)
GFR SERPL CREATININE-BSD FRML MDRD: 69 ML/MIN/1.7M2
GLUCOSE SERPL-MCNC: 118 MG/DL (ref 70–99)
GLUCOSE UR STRIP-MCNC: NEGATIVE MG/DL
HCT VFR BLD AUTO: 34.5 % (ref 35–47)
HGB BLD-MCNC: 12.1 G/DL (ref 11.7–15.7)
HGB UR QL STRIP: ABNORMAL
IMM GRANULOCYTES # BLD: 0 10E9/L (ref 0–0.4)
IMM GRANULOCYTES NFR BLD: 0.3 %
KETONES UR STRIP-MCNC: NEGATIVE MG/DL
LEUKOCYTE ESTERASE UR QL STRIP: ABNORMAL
LYMPHOCYTES # BLD AUTO: 0.6 10E9/L (ref 0.8–5.3)
LYMPHOCYTES NFR BLD AUTO: 9.5 %
MCH RBC QN AUTO: 36.3 PG (ref 26.5–33)
MCHC RBC AUTO-ENTMCNC: 35.1 G/DL (ref 31.5–36.5)
MCV RBC AUTO: 104 FL (ref 78–100)
MONOCYTES # BLD AUTO: 0.2 10E9/L (ref 0–1.3)
MONOCYTES NFR BLD AUTO: 3.5 %
NEUTROPHILS # BLD AUTO: 5.5 10E9/L (ref 1.6–8.3)
NEUTROPHILS NFR BLD AUTO: 86.3 %
NITRATE UR QL: NEGATIVE
NRBC # BLD AUTO: 0.1 10*3/UL
NRBC BLD AUTO-RTO: 1 /100
PH UR STRIP: 6 PH (ref 5–7)
PLATELET # BLD AUTO: 359 10E9/L (ref 150–450)
POTASSIUM SERPL-SCNC: 3.6 MMOL/L (ref 3.4–5.3)
RBC # BLD AUTO: 3.33 10E12/L (ref 3.8–5.2)
RBC #/AREA URNS AUTO: 3 /HPF (ref 0–2)
SODIUM SERPL-SCNC: 141 MMOL/L (ref 133–144)
SP GR UR STRIP: 1 (ref 1–1.03)
SQUAMOUS #/AREA URNS AUTO: <1 /HPF (ref 0–1)
URN SPEC COLLECT METH UR: ABNORMAL
UROBILINOGEN UR STRIP-MCNC: NORMAL MG/DL (ref 0–2)
WBC # BLD AUTO: 6.3 10E9/L (ref 4–11)
WBC #/AREA URNS AUTO: 95 /HPF (ref 0–2)

## 2017-02-21 PROCEDURE — 81001 URINALYSIS AUTO W/SCOPE: CPT | Performed by: EMERGENCY MEDICINE

## 2017-02-21 PROCEDURE — 25000132 ZZH RX MED GY IP 250 OP 250 PS 637: Performed by: EMERGENCY MEDICINE

## 2017-02-21 PROCEDURE — 85025 COMPLETE CBC W/AUTO DIFF WBC: CPT | Performed by: EMERGENCY MEDICINE

## 2017-02-21 PROCEDURE — 80048 BASIC METABOLIC PNL TOTAL CA: CPT | Performed by: EMERGENCY MEDICINE

## 2017-02-21 PROCEDURE — 87086 URINE CULTURE/COLONY COUNT: CPT | Performed by: EMERGENCY MEDICINE

## 2017-02-21 RX ORDER — LIDOCAINE 40 MG/G
CREAM TOPICAL
Status: DISCONTINUED | OUTPATIENT
Start: 2017-02-21 | End: 2017-02-21 | Stop reason: HOSPADM

## 2017-02-21 RX ORDER — PHENAZOPYRIDINE HYDROCHLORIDE 200 MG/1
200 TABLET, FILM COATED ORAL 3 TIMES DAILY
Qty: 9 TABLET | Refills: 0 | Status: SHIPPED | OUTPATIENT
Start: 2017-02-21 | End: 2017-02-24

## 2017-02-21 RX ORDER — CIPROFLOXACIN 250 MG/1
250 TABLET, FILM COATED ORAL 2 TIMES DAILY
Qty: 14 TABLET | Refills: 0 | Status: SHIPPED | OUTPATIENT
Start: 2017-02-21 | End: 2017-02-28

## 2017-02-21 RX ORDER — PHENAZOPYRIDINE HYDROCHLORIDE 200 MG/1
200 TABLET, FILM COATED ORAL ONCE
Status: COMPLETED | OUTPATIENT
Start: 2017-02-21 | End: 2017-02-21

## 2017-02-21 RX ADMIN — PHENAZOPYRIDINE HYDROCHLORIDE 200 MG: 200 TABLET ORAL at 00:36

## 2017-02-21 ASSESSMENT — ENCOUNTER SYMPTOMS
DIFFICULTY URINATING: 0
EYE REDNESS: 0
ABDOMINAL PAIN: 0
FEVER: 0
FLANK PAIN: 1
CONFUSION: 0
NECK STIFFNESS: 0
SHORTNESS OF BREATH: 0
DYSURIA: 1
COLOR CHANGE: 0
ARTHRALGIAS: 0
FREQUENCY: 1
HEADACHES: 0

## 2017-02-21 NOTE — DISCHARGE INSTRUCTIONS
Please make an appointment to follow up with Your Primary Care Provider in 1-2 days        Understanding Urinary Tract Infections (UTIs)  Most UTIs are caused by bacteria, although they may also be caused by viruses or fungi. Bacteria from the bowel are the most common source of infection. The infection may begin because of any of the following:    Sexual activity. During sex, germs can travel from the penis, vagina, or rectum into the urethra.     Germs on the skin outside the rectum may travel into the urethra. This is more common in women since the rectum and urethra are closer to each other than in men. Wiping from front to back after using the toilet and keeping the area clean can help prevent germs from getting to the urethra.    Blockage of urine flow through the urinary tract. If urine sits too long, germs may begin to grow out of control.      Parts of the urinary tract  The infection can occur in any part of the urinary tract.    The kidneys collect and store urine.    The ureters carry urine from the kidneys to the bladder.    The bladder holds urine until you are ready to let it out.    The urethra carries urine from the bladder out of the body. It is shorter in women, so bacteria can move through it more easily. The urethra is longer in men, so a UTI is less likely to reach the bladder or kidneys in men.    3400-7050 [employer]. [address] [place] [address], [place] [address]. All rights reserved. This information is not intended as a substitute for professional medical care. Always follow your healthcare professional's instructions.

## 2017-02-21 NOTE — ED PROVIDER NOTES
[place] EMERGENCY DEPARTMENT ([facility])  2017  ED 20   History     Chief Complaint   Patient presents with     UTI     The history is provided by the patient and medical records.     Nena Underwood is a 42 year old female who presents with urinary urgency, frequency, dysuria and left flank pain. She has a history of congenital kidney defect s/p kidney transplant in , TB s/p treatment.  She had recent admission to ED Obs from  to 17 for pyelonephritis of the transplanted kidney. The urine culture on 17 grew out 50,000-100,000 colonies/mL of Klebsiella pneumoniae. She was treated with Cipro and completed this 5 days ago. She was feeling better with the antibiotics, but at 7:30 PM today she developed recurrence of symptoms including dysuria and urinary urgency. She denies any fevers. No nausea or vomiting. She is on her period at this time.     I have reviewed the Medications, Allergies, Past Medical and Surgical History, and Social History in the Monitor system.    PAST MEDICAL HISTORY:   Past Medical History   Diagnosis Date     Anemia      High risk medication use      Hyperparathyroidism, secondary renal (H)      Immunosuppressed status (H)      IUD (intrauterine device) in place       4/3/14     Kidney replaced by transplant 6/23/10     KIDNEY TRANSPLANT STATUS     Moraxella catarrhalis pneumonia (HCC) 3/28/2016     Pneumonia 2013     requiring hospitalization     Renal aplasia      since birth     Single seizure (H)      felt secondary to uremia     TB (pulmonary tuberculosis)      treated with 4 drug regimen (INH, rifampin, ethambutol and pyrazinamide) for 6 months       PAST SURGICAL HISTORY:   Past Surgical History   Procedure Laterality Date     Insert intrauterine device       Paraguard. removed      Transplant kidney recipient living unrelated  6/23/10     Hemodialysis via av fistula       left arm      section  2014     Procedure:   SECTION;;  Surgeon: Griselda Becerra MD;  Location:  L+D       FAMILY HISTORY:   Family History   Problem Relation Age of Onset     Respiratory Paternal Grandmother      PGRENATO had pulmonary TB at age 85, then  of old age at 89; she lived with Nena     Eye Disorder Mother      retinal problems       SOCIAL HISTORY:   Social History   Substance Use Topics     Smoking status: Never Smoker     Smokeless tobacco: Never Used     Alcohol use No       Discharge Medication List as of 2017  1:27 AM      START taking these medications    Details   ciprofloxacin (CIPRO) 250 MG tablet Take 1 tablet (250 mg) by mouth 2 times daily for 7 days, Disp-14 tablet, R-0, Local Print         CONTINUE these medications which have NOT CHANGED    Details   phenazopyridine (PYRIDIUM) 100 MG tablet Take 1 tablet (100 mg) by mouth 3 times daily as needed for urinary tract discomfort, Disp-24 tablet, R-1, E-Prescribe      predniSONE (DELTASONE) 5 MG tablet TAKE ONE TABLET BY MOUTH EVERY DAY, Disp-90 tablet, R-3, Fax      PROGRAF 0.5 MG PO CAPSULE Take 1 capsule (0.5 mg) by mouth 2 times daily, Disp-60 capsule, R-11, RANJANA, E-Prescribe      azaTHIOprine (IMURAN) 50 MG tablet Take 2 tablets (100 mg) by mouth daily, Disp-180 tablet, R-3, E-Prescribe      sulfamethoxazole-trimethoprim (BACTRIM,SEPTRA) 400-80 MG per tablet Take 1 tablet by mouth daily, Disp-90 tablet, R-3, E-Prescribe      Acetaminophen (TYLENOL PO) Take 500 mg by mouth every 4 hours as needed for mild pain or fever, Historical                Allergies   Allergen Reactions     Nkda [No Known Drug Allergies]         Review of Systems   Constitutional: Negative for fever.   HENT: Negative for congestion.    Eyes: Negative for redness.   Respiratory: Negative for shortness of breath.    Cardiovascular: Negative for chest pain.   Gastrointestinal: Negative for abdominal pain.   Genitourinary: Positive for dysuria, flank pain, frequency and urgency. Negative for  "difficulty urinating.   Musculoskeletal: Negative for arthralgias and neck stiffness.   Skin: Negative for color change.   Neurological: Negative for headaches.   Psychiatric/Behavioral: Negative for confusion.       Physical Exam   BP: 125/69  Pulse: 81  Temp: 97.8  F (36.6  C)  Resp: 18  Height: 149.9 cm (4' 11\")  Weight: 43.1 kg (95 lb)  SpO2: 98 %  Physical Exam  Vital Signs and Nursing Notes Reviewed.  General:  NAD  HEENT: Oropharynx clear.  No obvious signs of trauma.  PERRL.  EOMI  Neck: Supple.  No lymphadenopathy.  Cardiac: RRR.  No murmurs, rubs or gallops  Lungs: Clear bilaterally.  Normal respiratory rate.    Abdomen:  Soft, Nontender, no rebound or guarding.  No tenderness over transplanted kidney.  Back: No CVA tenderness.  Skin:  No rash.  No diaphoresis  Extremities:  No cyanosis, clubbing, or edema.  Neurological:  CN II-XII intact, Strength intact, Sensation intact, No pronator drift, Normal gait.  Pulse:  Symmetric in bilateral upper and lower extremities.    ED Course     ED Course     Procedures             Critical Care time:  none               Labs Ordered and Resulted from Time of ED Arrival Up to the Time of Departure from the ED   ROUTINE UA WITH MICROSCOPIC - Abnormal; Notable for the following:        Result Value    Blood Urine Small (*)     Protein Albumin Urine 10 (*)     Leukocyte Esterase Urine Large (*)     WBC Urine 95 (*)     RBC Urine 3 (*)     Bacteria Urine Few (*)     All other components within normal limits   CBC WITH PLATELETS DIFFERENTIAL - Abnormal; Notable for the following:     RBC Count 3.33 (*)     Hematocrit 34.5 (*)      (*)     MCH 36.3 (*)     Nucleated RBCs 1 (*)     Absolute Lymphocytes 0.6 (*)     All other components within normal limits   BASIC METABOLIC PANEL - Abnormal; Notable for the following:     Glucose 118 (*)     All other components within normal limits   PERIPHERAL IV CATHETER   URINE CULTURE AEROBIC BACTERIAL       Assessments & Plan " (with Medical Decision Making)  42 year old presents with dysuria urgency frequency and hematuria.  Seems likely UTI.  Was recently treated for UTI.  Completed antibiotics.  Last culture does show Klebsiella as the cause of her UTI.   I will start her on Cipro for this.  White count is normal.  Creatinine is normal.   Is able tolerate by mouth.  Patient feeling better here in the ER after Pyridium.  Will discharge her home.  We'll have her follow-up with her PCP in one to 2 days.  She will return for any worsening symptoms such as fevers, vomiting or other concerns.         I have reviewed the nursing notes.    I have reviewed the findings, diagnosis, plan and need for follow up with the patient.    Discharge Medication List as of 2/21/2017  1:27 AM      START taking these medications    Details   ciprofloxacin (CIPRO) 250 MG tablet Take 1 tablet (250 mg) by mouth 2 times daily for 7 days, Disp-14 tablet, R-0, Local Print             Final diagnoses:   Acute cystitis with hematuria     IRosa, am serving as a trained medical scribe to document services personally performed by Lyndon Mims MD, based on the provider's statements to me on February 21, 2017.  This document has been checked and approved by the attending provider.    ILyndon MD, was physically present and have reviewed and verified the accuracy of this note documented by Rosa Diehl, medical scribe.    2/20/2017   [facility], [place], EMERGENCY DEPARTMENT     Rafa Mims MD  02/21/17 [id number]

## 2017-02-21 NOTE — ED NOTES
Patient came into the ED c/o urinary urgency, frequency, burning, and left sided pain. Patient has history of kidney transplant (2016), TB (2006). Afebrile

## 2017-02-23 LAB
BACTERIA SPEC CULT: NORMAL
MICRO REPORT STATUS: NORMAL
SPECIMEN SOURCE: NORMAL

## 2017-03-01 ENCOUNTER — TELEPHONE (OUTPATIENT)
Dept: NURSING | Facility: CLINIC | Age: 42
End: 2017-03-01

## 2017-03-01 DIAGNOSIS — R30.0 DYSURIA: Primary | ICD-10-CM

## 2017-03-01 RX ORDER — PHENAZOPYRIDINE HYDROCHLORIDE 100 MG/1
100 TABLET, FILM COATED ORAL 3 TIMES DAILY PRN
Qty: 12 TABLET | Refills: 0 | Status: SHIPPED
Start: 2017-03-01 | End: 2017-03-30

## 2017-03-02 ENCOUNTER — TELEPHONE (OUTPATIENT)
Dept: TRANSPLANT | Facility: CLINIC | Age: 42
End: 2017-03-02

## 2017-03-02 DIAGNOSIS — R82.90 NONSPECIFIC FINDING ON EXAMINATION OF URINE: ICD-10-CM

## 2017-03-02 DIAGNOSIS — Z94.0 KIDNEY REPLACED BY TRANSPLANT: Primary | ICD-10-CM

## 2017-03-02 DIAGNOSIS — Z94.0 KIDNEY TRANSPLANTED: Primary | ICD-10-CM

## 2017-03-02 DIAGNOSIS — Z94.0 KIDNEY TRANSPLANT STATUS: Primary | ICD-10-CM

## 2017-03-02 LAB
ALBUMIN UR-MCNC: NEGATIVE MG/DL
APPEARANCE UR: CLEAR
BACTERIA #/AREA URNS HPF: ABNORMAL /HPF
BILIRUB UR QL STRIP: NEGATIVE
COLOR UR AUTO: YELLOW
GLUCOSE UR STRIP-MCNC: NEGATIVE MG/DL
HGB UR QL STRIP: ABNORMAL
KETONES UR STRIP-MCNC: NEGATIVE MG/DL
LEUKOCYTE ESTERASE UR QL STRIP: ABNORMAL
NITRATE UR QL: POSITIVE
NON-SQ EPI CELLS #/AREA URNS LPF: ABNORMAL /LPF
PH UR STRIP: 5 PH (ref 5–7)
RBC #/AREA URNS AUTO: ABNORMAL /HPF (ref 0–2)
SP GR UR STRIP: <=1.005 (ref 1–1.03)
URN SPEC COLLECT METH UR: ABNORMAL
UROBILINOGEN UR STRIP-ACNC: 0.2 EU/DL (ref 0.2–1)
WBC #/AREA URNS AUTO: ABNORMAL /HPF (ref 0–2)

## 2017-03-02 PROCEDURE — 87088 URINE BACTERIA CULTURE: CPT | Performed by: FAMILY MEDICINE

## 2017-03-02 PROCEDURE — 87086 URINE CULTURE/COLONY COUNT: CPT | Performed by: FAMILY MEDICINE

## 2017-03-02 PROCEDURE — 87186 SC STD MICRODIL/AGAR DIL: CPT | Performed by: FAMILY MEDICINE

## 2017-03-02 PROCEDURE — 81001 URINALYSIS AUTO W/SCOPE: CPT | Performed by: FAMILY MEDICINE

## 2017-03-02 RX ORDER — CIPROFLOXACIN 500 MG/1
500 TABLET, FILM COATED ORAL 2 TIMES DAILY
Qty: 28 TABLET | Refills: 0 | Status: SHIPPED | OUTPATIENT
Start: 2017-03-02 | End: 2017-03-16

## 2017-03-02 NOTE — TELEPHONE ENCOUNTER
Nena has UA/AC done here.  She called on her lunch break 1:20-2:10pm   She has the chills, temp 99.4, right side pain.  She feels something is up. Please call her cell # [phone]

## 2017-03-02 NOTE — TELEPHONE ENCOUNTER
Called spoke to Nena Underwood regarding  UTI  Sent RX for Cipro to local [facility]  Per Dr Chong   Issue Recurrent UTI 's    IUD recently placed for BC -  History of UTI's with IUD     Dr Chong requesting ID appointment

## 2017-03-02 NOTE — TELEPHONE ENCOUNTER
Call Type: Triage Call    Presenting Problem: Pt has a kidney transplant and she is on prograf.  She reports that this evening she developed UIT symptoms of burning  and frequency.    Paged on call provider via the switchboard for Dr. Chong at 10:30 P.M.  Triage Note:  Guideline Title: Urinary Symptoms - Female  Recommended Disposition: See Provider within 8 Hours  Original Inclination: Would have called clinic  Override Disposition:  Intended Action: Call PCP/HCP  Physician Contacted: No  Urinary tract symptoms AND any flank or low back pain ?  YES  Blood in urine ? NO  Abnormal vaginal discharge (such as increased quantity, abnormal color,  consistency, odor) ? NO  Flank pain ? NO  New or worsening signs and symptoms that may indicate shock ? NO  Any temperature elevation in a frail elderly, immunocompromised or pregnant person  ? NO  Unbearable abdominal/pelvic pain ? NO  Current or recent urinary tract instrumentation AND urinary tract symptoms OR no  urine flow ? NO  Urinary tract symptoms AND fever 101.5 F (38.6 C) or higher or vomiting ? NO  No urination for 12 or more hours ? NO  Any other unexpected urinary symptoms following urinary tract or abdominal surgery  within timeframe specified by provider ? NO  Physician Instructions:  Care Advice: Systemic Inflammatory Response Syndrome (SIRS):   Watch for  signs of a generalized, whole body infection. Occurs within days of a  localized infection, especially of the urinary, GI, respiratory or nervous  systems  or after a traumatic injury or invasive procedure.   - Call  if  symptoms have worsened, such as increasing confusion or unusual drowsiness  cold and clammy skin  no urine output  rapid respiration (>30/min.) or slow respiration (<10/min.)  struggling to breathe.   - Go to the ED immediately for early symptoms of  rapid pulse >90/min. or rapid breathing >20/min. at rest  chills  oral temperature >100.4 F (38 C) or <96.8 F (36 C) when associated  with  conditions noted.  Limit carbonated, alcoholic, and caffeinated beverages such as coffee, tea  and soda.  Avoid nonprescription cold and allergy medications that contain  caffeine.  Limit intake of tomatoes, fruit juices (except for unsweetened  cranberry juice), dairy products, spicy foods, sugar, and artificial  sweeteners (aspartame or saccharine).  Stop or decrease smoking.  Reducing  exposure to bladder irritants may help lessen urgency.  Increase intake of fluids. Try to drink 8 oz. (.2 liter) every hour when  awake, unless on restricted fluids for other medical reasons. Include at  least two 8 oz. (.2 liter) glasses of unsweetened cranberry juice each day.  Take sips of fluid or eat ice chips if nauseated or vomiting.

## 2017-03-04 LAB
BACTERIA SPEC CULT: ABNORMAL
MICRO REPORT STATUS: ABNORMAL
MICROORGANISM SPEC CULT: ABNORMAL
SPECIMEN SOURCE: ABNORMAL

## 2017-03-06 ENCOUNTER — TELEPHONE (OUTPATIENT)
Dept: TRANSPLANT | Facility: CLINIC | Age: 42
End: 2017-03-06

## 2017-03-13 ENCOUNTER — OFFICE VISIT (OUTPATIENT)
Dept: FAMILY MEDICINE | Facility: CLINIC | Age: 42
End: 2017-03-13
Payer: COMMERCIAL

## 2017-03-13 VITALS
HEART RATE: 106 BPM | TEMPERATURE: 98.6 F | OXYGEN SATURATION: 100 % | HEIGHT: 59 IN | DIASTOLIC BLOOD PRESSURE: 68 MMHG | SYSTOLIC BLOOD PRESSURE: 97 MMHG | BODY MASS INDEX: 18.14 KG/M2 | WEIGHT: 90 LBS

## 2017-03-13 DIAGNOSIS — Z97.5 IUD (INTRAUTERINE DEVICE) IN PLACE: Primary | ICD-10-CM

## 2017-03-13 DIAGNOSIS — R30.0 DYSURIA: ICD-10-CM

## 2017-03-13 DIAGNOSIS — Z94.0 KIDNEY TRANSPLANTED: ICD-10-CM

## 2017-03-13 LAB
ALBUMIN UR-MCNC: NEGATIVE MG/DL
APPEARANCE UR: CLEAR
BILIRUB UR QL STRIP: NEGATIVE
COLOR UR AUTO: YELLOW
GLUCOSE UR STRIP-MCNC: NEGATIVE MG/DL
HGB UR QL STRIP: ABNORMAL
KETONES UR STRIP-MCNC: NEGATIVE MG/DL
LEUKOCYTE ESTERASE UR QL STRIP: NEGATIVE
MUCOUS THREADS #/AREA URNS LPF: PRESENT /LPF
NITRATE UR QL: NEGATIVE
NON-SQ EPI CELLS #/AREA URNS LPF: ABNORMAL /LPF
PH UR STRIP: 5 PH (ref 5–7)
RBC #/AREA URNS AUTO: ABNORMAL /HPF (ref 0–2)
SP GR UR STRIP: <=1.005 (ref 1–1.03)
URN SPEC COLLECT METH UR: ABNORMAL
UROBILINOGEN UR STRIP-ACNC: 0.2 EU/DL (ref 0.2–1)
WBC #/AREA URNS AUTO: ABNORMAL /HPF (ref 0–2)

## 2017-03-13 PROCEDURE — 87086 URINE CULTURE/COLONY COUNT: CPT | Performed by: FAMILY MEDICINE

## 2017-03-13 PROCEDURE — 99213 OFFICE O/P EST LOW 20 MIN: CPT | Performed by: PHYSICIAN ASSISTANT

## 2017-03-13 PROCEDURE — 81001 URINALYSIS AUTO W/SCOPE: CPT | Performed by: FAMILY MEDICINE

## 2017-03-13 NOTE — NURSING NOTE
"Chief Complaint   Patient presents with     IUD     IUD removal       Initial BP 97/68 (BP Location: Right arm, Cuff Size: Adult Regular)  Pulse 106  Temp 98.6  F (37  C) (Oral)  Ht 4' 11\" (1.499 m)  Wt 90 lb (40.8 kg)  SpO2 100%  Breastfeeding? No  BMI 18.18 kg/m2 Estimated body mass index is 18.18 kg/(m^2) as calculated from the following:    Height as of this encounter: 4' 11\" (1.499 m).    Weight as of this encounter: 90 lb (40.8 kg).  Medication Reconciliation: complete    "

## 2017-03-13 NOTE — MR AVS SNAPSHOT
After Visit Summary   3/13/2017    Nena Underwood    MRN: [id number]           Patient Information     Date Of Birth          1975        Visit Information        Provider Department      3/13/2017 11:20 AM Miguelina Bradford PA-C [facility]        Today's Diagnoses     IUD (intrauterine device) in place    -  1       Follow-ups after your visit        Follow-up notes from your care team     Return if symptoms worsen or fail to improve.      Your next 10 appointments already scheduled     May 03, 2017  8:00 AM CDT   (Arrive by 7:45 AM)   New Patient Visit with Tracy Loving MD   [facility] ([facility])    [address] [place] [id number] [phone]            Jun 20, 2017  3:40 PM CDT   (Arrive by 3:25 PM)   Return Kidney Transplant with Walter Chong MD   [facility] ([facility])    [address] [place] [id number] [phone]              Who to contact     If you have questions or need follow up information about today's clinic visit or your schedule please contact [facility] directly at [phone].  Normal or non-critical lab and imaging results will be communicated to you by MyChart, letter or phone within 4 business days after the clinic has received the results. If you do not hear from us within 7 days, please contact the clinic through MyChart or phone. If you have a critical or abnormal lab result, we will notify you by phone as soon as possible.  Submit refill requests through [employer] or call your pharmacy and they will forward the refill request to us. Please allow 3 business days for your refill to be completed.          Additional Information About Your Visit        [employer] Information     [employer] gives you secure access to your electronic health record. If you see a  "primary care provider, you can also send messages to your care team and make appointments. If you have questions, please call your primary care clinic.  If you do not have a primary care provider, please call [phone] and they will assist you.        Care EveryWhere ID     This is your Care EveryWhere ID. This could be used by other organizations to access your [place] medical records  [id number]        Your Vitals Were     Pulse Temperature Height Pulse Oximetry Breastfeeding? BMI (Body Mass Index)    106 98.6  F (37  C) (Oral) 4' 11\" (1.499 m) 100% No 18.18 kg/m2       Blood Pressure from Last 3 Encounters:   03/13/17 97/68   02/21/17 119/71   02/06/17 112/74    Weight from Last 3 Encounters:   03/13/17 90 lb (40.8 kg)   02/20/17 95 lb (43.1 kg)   02/06/17 98 lb 3.2 oz (44.5 kg)              Today, you had the following     No orders found for display       Primary Care Provider Office Phone # Fax #    Jorge L Tucker [phone]       [address] [address]        Thank you!     Thank you for choosing [facility]  for your care. Our goal is always to provide you with excellent care. Hearing back from our patients is one way we can continue to improve our services. Please take a few minutes to complete the written survey that you may receive in the mail after your visit with us. Thank you!             Your Updated Medication List - Protect others around you: Learn how to safely use, store and throw away your medicines at www.disposemymeds.org.          This list is accurate as of: 3/13/17  1:13 PM.  Always use your most recent med list.                   Brand Name Dispense Instructions for use    azaTHIOprine 50 MG tablet    IMURAN    180 tablet    Take 2 tablets (100 mg) by mouth daily       ciprofloxacin 500 MG tablet    CIPRO    28 tablet    Take 1 tablet (500 mg) by mouth 2 times daily for 14 days       phenazopyridine 100 " MG tablet    PYRIDIUM    12 tablet    Take 1 tablet (100 mg) by mouth 3 times daily as needed for urinary tract discomfort       predniSONE 5 MG tablet    DELTASONE    90 tablet    TAKE ONE TABLET BY MOUTH EVERY DAY       sulfamethoxazole-trimethoprim 400-80 MG per tablet    BACTRIM/SEPTRA    90 tablet    Take 1 tablet by mouth daily       tacrolimus capsule     60 capsule    Take 1 capsule (0.5 mg) by mouth 2 times daily       TYLENOL PO      Take 500 mg by mouth every 4 hours as needed for mild pain or fever

## 2017-03-13 NOTE — PROGRESS NOTES
SUBJECTIVE:                                                    Nena Underwood is a 42 year old female who presents to clinic today for the following health issues:    HPI: Patient has had Mirena since - convinced it is causing recurrent UTIs - had 3 in feb. She wants it out. Not interested in other methods at this time. Had conversation with  about this and he agrees.       Problem list and histories reviewed & adjusted, as indicated.  Additional history: as documented    Patient Active Problem List   Diagnosis     Kidney replaced by transplant     CARDIOVASCULAR SCREENING; LDL GOAL LESS THAN 100     Immunosuppressed status (H)     Moraxella catarrhalis pneumonia (H)     IUD (intrauterine device) in place     Pyelonephritis     Cervical cancer screening     Past Surgical History   Procedure Laterality Date     Insert intrauterine device       Paraguard. removed      Transplant kidney recipient living unrelated  6/23/10     Hemodialysis via av fistula       left arm      section  2014     Procedure:  SECTION;;  Surgeon: Griselda Becerra MD;  Location:  L+D       Social History   Substance Use Topics     Smoking status: Never Smoker     Smokeless tobacco: Never Used     Alcohol use No     Family History   Problem Relation Age of Onset     Respiratory Paternal Grandmother      PGM had pulmonary TB at age 85, then  of old age at 89; she lived with Nena     Eye Disorder Mother      retinal problems         Current Outpatient Prescriptions   Medication Sig Dispense Refill     ciprofloxacin (CIPRO) 500 MG tablet Take 1 tablet (500 mg) by mouth 2 times daily for 14 days 28 tablet 0     phenazopyridine (PYRIDIUM) 100 MG tablet Take 1 tablet (100 mg) by mouth 3 times daily as needed for urinary tract discomfort 12 tablet 0     predniSONE (DELTASONE) 5 MG tablet TAKE ONE TABLET BY MOUTH EVERY DAY 90 tablet 3     PROGRAF 0.5 MG PO CAPSULE Take 1 capsule (0.5 mg) by  "mouth 2 times daily 60 capsule 11     azaTHIOprine (IMURAN) 50 MG tablet Take 2 tablets (100 mg) by mouth daily 180 tablet 3     sulfamethoxazole-trimethoprim (BACTRIM,SEPTRA) 400-80 MG per tablet Take 1 tablet by mouth daily 90 tablet 3     Acetaminophen (TYLENOL PO) Take 500 mg by mouth every 4 hours as needed for mild pain or fever       Allergies   Allergen Reactions     Nkda [No Known Drug Allergies]      Labs reviewed in EPIC    Reviewed and updated as needed this visit by clinical staff       Reviewed and updated as needed this visit by Provider         Social History     Social History     Marital status:      Spouse name: N/A     Number of children: N/A     Years of education: N/A     Occupational History           [employer]     Social History Main Topics     Smoking status: Never Smoker     Smokeless tobacco: Never Used     Alcohol use No     Drug use: No     Sexual activity: Yes     Partners: Male     Birth control/ protection: IUD     Other Topics Concern      Service No     Blood Transfusions No     Weight Concern No     Exercise No     Bike Helmet Not Asked     na     Seat Belt Yes     Social History Narrative    Ms. Underwood emigrated from the [facility] in May, [place]. She is currently living with her  in [place], [place].  She works for the [employer] in [place] (a ).        10 point review of systems negative other than symptoms noted above.   Constitutional, HEENT, CV, pulmonary, GI, , MS, Endo, Psych systems are all negative, except as otherwise noted.       OBJECTIVE:  BP 97/68 (BP Location: Right arm, Cuff Size: Adult Regular)  Pulse 106  Temp 98.6  F (37  C) (Oral)  Ht 4' 11\" (1.499 m)  Wt 90 lb (40.8 kg)  SpO2 100%  Breastfeeding? No  BMI 18.18 kg/m2  CONSTITUTIONAL: Alert, well-nourished, well-groomed, NAD   (female): normal female external genitalia, vaginal mucosa, normal cervix/adnexa/uterus without masses or " discharge. Mirena strings able to be briefly viewed - able to grasp ends with ring forceps but IUD feels to be stuck, will not come out.   DERM: No rashes or suspicious lesions    Diagnostic Tests:  none    ASSESSMENT/PLAN:  (Z97.5) IUD (intrauterine device) in place  (primary encounter diagnosis)  Comment:   Plan: Unable to remove - apptmt made with [facility] at 1 PM today.       FOLLOW-UP: As above.   The patient agrees with this assessment and plan and agrees to call or return to the clinic with any questions or concerns or if their condition worsens.    MILLIE Nath, PA-C  [facility]

## 2017-03-14 LAB
BACTERIA SPEC CULT: NO GROWTH
MICRO REPORT STATUS: NORMAL
SPECIMEN SOURCE: NORMAL

## 2017-03-20 ENCOUNTER — HOSPITAL ENCOUNTER (INPATIENT)
Facility: CLINIC | Age: 42
LOS: 2 days | Discharge: HOME | DRG: 698 | End: 2017-03-23
Attending: EMERGENCY MEDICINE | Admitting: INTERNAL MEDICINE
Payer: COMMERCIAL

## 2017-03-20 DIAGNOSIS — R30.0 DYSURIA: ICD-10-CM

## 2017-03-20 DIAGNOSIS — E21.3 HYPERPARATHYROIDISM (H): ICD-10-CM

## 2017-03-20 DIAGNOSIS — N10 ACUTE PYELONEPHRITIS: ICD-10-CM

## 2017-03-20 DIAGNOSIS — D84.9 IMMUNOSUPPRESSION (H): ICD-10-CM

## 2017-03-20 DIAGNOSIS — T86.10 COMPLICATION OF TRANSPLANTED KIDNEY: ICD-10-CM

## 2017-03-20 DIAGNOSIS — D84.9 IMMUNOSUPPRESSED STATUS (H): ICD-10-CM

## 2017-03-20 DIAGNOSIS — Z94.0 KIDNEY REPLACED BY TRANSPLANT: ICD-10-CM

## 2017-03-20 DIAGNOSIS — Z94.0 KIDNEY TRANSPLANTED: Primary | ICD-10-CM

## 2017-03-20 DIAGNOSIS — R82.90 NONSPECIFIC FINDING ON EXAMINATION OF URINE: Primary | ICD-10-CM

## 2017-03-20 LAB
ALBUMIN SERPL-MCNC: 3.4 G/DL (ref 3.4–5)
ALBUMIN UR-MCNC: 10 MG/DL
ALBUMIN UR-MCNC: ABNORMAL MG/DL
ALP SERPL-CCNC: 81 U/L (ref 40–150)
ALT SERPL W P-5'-P-CCNC: 36 U/L (ref 0–50)
ANION GAP SERPL CALCULATED.3IONS-SCNC: 10 MMOL/L (ref 3–14)
APPEARANCE UR: ABNORMAL
APPEARANCE UR: ABNORMAL
AST SERPL W P-5'-P-CCNC: 38 U/L (ref 0–45)
BACTERIA #/AREA URNS HPF: ABNORMAL /HPF
BACTERIA #/AREA URNS HPF: ABNORMAL /HPF
BASOPHILS # BLD AUTO: 0 10E9/L (ref 0–0.2)
BASOPHILS NFR BLD AUTO: 0.2 %
BILIRUB SERPL-MCNC: 0.9 MG/DL (ref 0.2–1.3)
BILIRUB UR QL STRIP: NEGATIVE
BILIRUB UR QL STRIP: NEGATIVE
BUN SERPL-MCNC: 22 MG/DL (ref 7–30)
CALCIUM SERPL-MCNC: 8.8 MG/DL (ref 8.5–10.1)
CHLORIDE SERPL-SCNC: 108 MMOL/L (ref 94–109)
CO2 SERPL-SCNC: 22 MMOL/L (ref 20–32)
COLOR UR AUTO: ABNORMAL
COLOR UR AUTO: YELLOW
CREAT SERPL-MCNC: 1.22 MG/DL (ref 0.52–1.04)
DIFFERENTIAL METHOD BLD: ABNORMAL
EOSINOPHIL # BLD AUTO: 0 10E9/L (ref 0–0.7)
EOSINOPHIL NFR BLD AUTO: 0 %
ERYTHROCYTE [DISTWIDTH] IN BLOOD BY AUTOMATED COUNT: 12.9 % (ref 10–15)
GFR SERPL CREATININE-BSD FRML MDRD: 48 ML/MIN/1.7M2
GLUCOSE SERPL-MCNC: 85 MG/DL (ref 70–99)
GLUCOSE UR STRIP-MCNC: NEGATIVE MG/DL
GLUCOSE UR STRIP-MCNC: NEGATIVE MG/DL
HCG UR QL: NEGATIVE
HCT VFR BLD AUTO: 31.3 % (ref 35–47)
HGB BLD-MCNC: 11 G/DL (ref 11.7–15.7)
HGB UR QL STRIP: ABNORMAL
HGB UR QL STRIP: ABNORMAL
IMM GRANULOCYTES # BLD: 0 10E9/L (ref 0–0.4)
IMM GRANULOCYTES NFR BLD: 0.3 %
KETONES UR STRIP-MCNC: NEGATIVE MG/DL
KETONES UR STRIP-MCNC: NEGATIVE MG/DL
LEUKOCYTE ESTERASE UR QL STRIP: ABNORMAL
LEUKOCYTE ESTERASE UR QL STRIP: ABNORMAL
LYMPHOCYTES # BLD AUTO: 0.7 10E9/L (ref 0.8–5.3)
LYMPHOCYTES NFR BLD AUTO: 10.7 %
MCH RBC QN AUTO: 35.9 PG (ref 26.5–33)
MCHC RBC AUTO-ENTMCNC: 35.1 G/DL (ref 31.5–36.5)
MCV RBC AUTO: 102 FL (ref 78–100)
MONOCYTES # BLD AUTO: 0.4 10E9/L (ref 0–1.3)
MONOCYTES NFR BLD AUTO: 6.6 %
MUCOUS THREADS #/AREA URNS LPF: PRESENT /LPF
NEUTROPHILS # BLD AUTO: 5.4 10E9/L (ref 1.6–8.3)
NEUTROPHILS NFR BLD AUTO: 82.2 %
NITRATE UR QL: NEGATIVE
NITRATE UR QL: POSITIVE
NON-SQ EPI CELLS #/AREA URNS LPF: ABNORMAL /LPF
NRBC # BLD AUTO: 0 10*3/UL
NRBC BLD AUTO-RTO: 0 /100
PH UR STRIP: 5 PH (ref 5–7)
PH UR STRIP: 5.5 PH (ref 5–7)
PLATELET # BLD AUTO: 233 10E9/L (ref 150–450)
POTASSIUM SERPL-SCNC: 3.8 MMOL/L (ref 3.4–5.3)
PROT SERPL-MCNC: 7.4 G/DL (ref 6.8–8.8)
RBC # BLD AUTO: 3.06 10E12/L (ref 3.8–5.2)
RBC #/AREA URNS AUTO: 12 /HPF (ref 0–2)
RBC #/AREA URNS AUTO: ABNORMAL /HPF (ref 0–2)
SODIUM SERPL-SCNC: 140 MMOL/L (ref 133–144)
SP GR UR STRIP: 1.01 (ref 1–1.03)
SP GR UR STRIP: <=1.005 (ref 1–1.03)
SQUAMOUS #/AREA URNS AUTO: <1 /HPF (ref 0–1)
TRANS CELLS #/AREA URNS HPF: <1 /HPF (ref 0–1)
URN SPEC COLLECT METH UR: ABNORMAL
URN SPEC COLLECT METH UR: ABNORMAL
UROBILINOGEN UR STRIP-ACNC: 0.2 EU/DL (ref 0.2–1)
UROBILINOGEN UR STRIP-MCNC: NORMAL MG/DL (ref 0–2)
WBC # BLD AUTO: 6.5 10E9/L (ref 4–11)
WBC #/AREA URNS AUTO: >182 /HPF (ref 0–2)
WBC #/AREA URNS AUTO: ABNORMAL /HPF (ref 0–2)
WBC CLUMPS #/AREA URNS HPF: PRESENT /HPF

## 2017-03-20 PROCEDURE — 99207 ZZC NO BILLABLE SERVICE THIS VISIT: CPT | Performed by: FAMILY MEDICINE

## 2017-03-20 PROCEDURE — 96365 THER/PROPH/DIAG IV INF INIT: CPT | Mod: 59 | Performed by: EMERGENCY MEDICINE

## 2017-03-20 PROCEDURE — 87186 SC STD MICRODIL/AGAR DIL: CPT | Performed by: EMERGENCY MEDICINE

## 2017-03-20 PROCEDURE — 87086 URINE CULTURE/COLONY COUNT: CPT | Performed by: EMERGENCY MEDICINE

## 2017-03-20 PROCEDURE — 81001 URINALYSIS AUTO W/SCOPE: CPT | Performed by: FAMILY MEDICINE

## 2017-03-20 PROCEDURE — 81025 URINE PREGNANCY TEST: CPT | Performed by: EMERGENCY MEDICINE

## 2017-03-20 PROCEDURE — 81001 URINALYSIS AUTO W/SCOPE: CPT | Performed by: EMERGENCY MEDICINE

## 2017-03-20 PROCEDURE — 87088 URINE BACTERIA CULTURE: CPT | Performed by: EMERGENCY MEDICINE

## 2017-03-20 PROCEDURE — 85025 COMPLETE CBC W/AUTO DIFF WBC: CPT | Performed by: EMERGENCY MEDICINE

## 2017-03-20 PROCEDURE — 87086 URINE CULTURE/COLONY COUNT: CPT | Performed by: FAMILY MEDICINE

## 2017-03-20 PROCEDURE — 99285 EMERGENCY DEPT VISIT HI MDM: CPT | Mod: 25 | Performed by: EMERGENCY MEDICINE

## 2017-03-20 PROCEDURE — 25000132 ZZH RX MED GY IP 250 OP 250 PS 637: Performed by: EMERGENCY MEDICINE

## 2017-03-20 PROCEDURE — 99285 EMERGENCY DEPT VISIT HI MDM: CPT | Mod: Z6 | Performed by: EMERGENCY MEDICINE

## 2017-03-20 PROCEDURE — 87088 URINE BACTERIA CULTURE: CPT | Performed by: FAMILY MEDICINE

## 2017-03-20 PROCEDURE — 87186 SC STD MICRODIL/AGAR DIL: CPT | Performed by: FAMILY MEDICINE

## 2017-03-20 PROCEDURE — 80053 COMPREHEN METABOLIC PANEL: CPT | Performed by: EMERGENCY MEDICINE

## 2017-03-20 RX ORDER — OXYCODONE HYDROCHLORIDE 5 MG/1
10 TABLET ORAL ONCE
Status: COMPLETED | OUTPATIENT
Start: 2017-03-20 | End: 2017-03-20

## 2017-03-20 RX ORDER — CEFTRIAXONE 1 G/1
1 INJECTION, POWDER, FOR SOLUTION INTRAMUSCULAR; INTRAVENOUS ONCE
Status: COMPLETED | OUTPATIENT
Start: 2017-03-20 | End: 2017-03-21

## 2017-03-20 RX ADMIN — OXYCODONE HYDROCHLORIDE 10 MG: 5 TABLET ORAL at 21:35

## 2017-03-20 ASSESSMENT — ENCOUNTER SYMPTOMS
FEVER: 1
BACK PAIN: 1
SHORTNESS OF BREATH: 0
DYSURIA: 1
DIARRHEA: 1
HEADACHES: 1
COUGH: 0
VOMITING: 0

## 2017-03-20 NOTE — IP AVS SNAPSHOT
MRN:[id number]                      After Visit Summary   3/20/2017    Nena Underwood    MRN: [id number]           Thank you!     Thank you for choosing [facility] for your care. Our goal is always to provide you with excellent care. Hearing back from our patients is one way we can continue to improve our services. Please take a few minutes to complete the written survey that you may receive in the mail after you visit with us. Thank you!        Patient Information     Date Of Birth          1975        About your hospital stay     You were admitted on:  March 21, 2017 You last received care in the:  [address] [facility]    You were discharged on:  March 23, 2017        Reason for your hospital stay       You were admitted with pyelonephritis and discharged on an extended course of oral antibiotics when symptoms decreased and you were afebrile for >24 hours                  Who to Call     For medical emergencies, please call 911.  For non-urgent questions about your medical care, please call your primary care provider or clinic, [phone]          Attending Provider     Provider Specialty    Nicole Umanzor MD Emergency Medicine    [facility], Alvarez López MD Internal Medicine       Primary Care Provider Office Phone # Fax #    Jorge L Tucker [phone]       [address] [address]         When to contact your care team       Call or return if you develop increased abdominal pain, during/pain/increased frequency/blood with urination, fever >101, confusion, altered mental status, inability to tolerate orals, worsened diarrhea, or any other symptom of concern                  After Care Instructions     Activity       Your activity upon discharge: activity as tolerated            Diet       Follow this diet upon discharge: Orders Placed This Encounter      Combination Diet Regular Diet Adult            Discharge  "Instructions       1. Start taking a probiotic pill or yogurt with \"live and active cultures\" to prevent diarrhea  2. If diarrhea worsens, see your primary provider for stool testing  3. Icy hot to the neck as needed  4. Make sure to practice good genital hygiene including wiping front to back, urinating and cleaning genitals after sexual intercourse, wearing clean underpants, washing genitals daily with soap and water in the shower, etc.                  Follow-up Appointments     Adult [facility] Follow-up and recommended labs and tests       Follow up with PCP within 1 week for hospital follow up and repeat CBC, BMP.    Follow up with transplant nephrology.    Referral to OP urology made, follow up when possible.     Appointments on [place] and/or [facility] (with [facility] or [facility] provider or service). Call [phone] if you haven't heard regarding these appointments within 7 days of discharge.                  Your next 10 appointments already scheduled     Mar 28, 2017 11:20 AM CDT   Office Visit with Miguelina Bradford PA-C   [facility] ([address] [address] [phone]           Bring a current list of meds and any records pertaining to this visit.  For Physicals, please bring immunization records and any forms needing to be filled out.  Please arrive 10 minutes early to complete paperwork.            May 03, 2017  8:00 AM CDT   (Arrive by 7:45 AM)   New Patient Visit with Tracy Loving MD   [facility] ([facility])    [address] [place] [address] [phone]            Jun 20, 2017  3:40 PM CDT   (Arrive by 3:25 PM)   Return Kidney Transplant with Walter Chong MD   [facility] ([facility])    [address] [place] [address] [phone]    "           Additional Services     Urology Adult Referral                 Pending Results     Date and Time Order Name Status Description    3/23/2017 0000 Tacrolimus level In process     3/21/2017 1727 Blood culture Preliminary     3/21/2017 1727 Blood culture Preliminary             Statement of Approval     Ordered          03/23/17 1119  I have reviewed and agree with all the recommendations and orders detailed in this document.  EFFECTIVE NOW     Approved and electronically signed by:  Maryam Crooks PA-C             Admission Information     Date & Time Provider Department Dept. Phone    3/20/2017 Alvarez Valencia MD [address] [facility] [phone]      Your Vitals Were     Blood Pressure Pulse Temperature Respirations Height Weight    108/73 (BP Location: Right arm) 65 97.7  F (36.5  C) (Oral) 16 1.524 m (5') 41.7 kg (92 lb)    Last Period Pulse Oximetry BMI (Body Mass Index)             03/20/2017 (Exact Date) 100% 17.97 kg/m2         [employer] Interactive Information     [employer] gives you secure access to your electronic health record. If you see a primary care provider, you can also send messages to your care team and make appointments. If you have questions, please call your primary care clinic.  If you do not have a primary care provider, please call [phone] and they will assist you.        Care EveryWhere ID     This is your Care EveryWhere ID. This could be used by other organizations to access your [facility] medical records  [id number]           Review of your medicines      START taking        Dose / Directions    levofloxacin 500 MG tablet   Commonly known as:  LEVAQUIN   Indication:  Pyelonephritis   Used for:  Acute pyelonephritis        Dose:  500 mg   Start taking on:  3/24/2017   Take 1 tablet (500 mg) by mouth daily for 18 days   Quantity:  18 tablet   Refills:  0         CONTINUE these medicines which have NOT CHANGED        Dose / Directions    azaTHIOprine 50 MG tablet    Commonly known as:  IMURAN   Used for:  Kidney replaced by transplant        Dose:  100 mg   Take 2 tablets (100 mg) by mouth daily   Quantity:  180 tablet   Refills:  3       phenazopyridine 100 MG tablet   Commonly known as:  PYRIDIUM   Used for:  Dysuria        Dose:  100 mg   Take 1 tablet (100 mg) by mouth 3 times daily as needed for urinary tract discomfort   Quantity:  12 tablet   Refills:  0       predniSONE 5 MG tablet   Commonly known as:  DELTASONE   Used for:  Kidney transplanted        TAKE ONE TABLET BY MOUTH EVERY DAY   Quantity:  90 tablet   Refills:  3       sulfamethoxazole-trimethoprim 400-80 MG per tablet   Commonly known as:  BACTRIM/SEPTRA   Indication:  PJP ppx   Used for:  Immunosuppressed status (H)        Dose:  1 tablet   Take 1 tablet by mouth daily   Quantity:  90 tablet   Refills:  3       tacrolimus capsule   Used for:  Kidney transplanted        Dose:  0.5 mg   Take 1 capsule (0.5 mg) by mouth 2 times daily   Quantity:  60 capsule   Refills:  11       TYLENOL PO        Dose:  500 mg   Take 500 mg by mouth every 4 hours as needed for mild pain or fever   Refills:  0            Where to get your medicines      These medications were sent to [facility] [address] [address]     Phone:  [phone]     levofloxacin 500 MG tablet                Protect others around you: Learn how to safely use, store and throw away your medicines at www.disposemymeds.org.             Medication List: This is a list of all your medications and when to take them. Check marks below indicate your daily home schedule. Keep this list as a reference.      Medications           Morning Afternoon Evening Bedtime As Needed    azaTHIOprine 50 MG tablet   Commonly known as:  IMURAN   Take 2 tablets (100 mg) by mouth daily   Last time this was given:  100 mg on 3/23/2017  8:36 AM                                   levofloxacin 500 MG  tablet   Commonly known as:  LEVAQUIN   Take 1 tablet (500 mg) by mouth daily for 18 days   Start taking on:  3/24/2017   Last time this was given:  750 mg on 3/23/2017 12:34 AM                                phenazopyridine 100 MG tablet   Commonly known as:  PYRIDIUM   Take 1 tablet (100 mg) by mouth 3 times daily as needed for urinary tract discomfort                                predniSONE 5 MG tablet   Commonly known as:  DELTASONE   TAKE ONE TABLET BY MOUTH EVERY DAY   Last time this was given:  5 mg on 3/23/2017  8:36 AM                                   sulfamethoxazole-trimethoprim 400-80 MG per tablet   Commonly known as:  BACTRIM/SEPTRA   Take 1 tablet by mouth daily   Last time this was given:  1 tablet on 3/23/2017  8:36 AM                                   tacrolimus capsule   Take 1 capsule (0.5 mg) by mouth 2 times daily   Last time this was given:  0.5 mg on 3/23/2017  8:36 AM                                      TYLENOL PO   Take 500 mg by mouth every 4 hours as needed for mild pain or fever   Last time this was given:  650 mg on 3/23/2017  1:46 AM                                          More Information        Discharge Instructions for Pyelonephritis  You have been told you have a kidney infection. This is called pyelonephritis. The infection can be serious. It can damage your kidneys and cause bacteria to enter your bloodstream. You were treated in the hospital. Once you return home, here s what you can do at home to aid in your recovery and prevent future infections.  Home care    Take all the medication you were prescribed, even if you feel better. Not finishing the medication can make the infection come back. It may also make a future infection harder to treat.    Unless told not to by your healthcare provider, drink 8 to 12 glasses of fluid every day. Clear fluids, such as water, are best. This may help flush the infection from your system.  Preventing future infection    Keep your  "genital area clean. Use mild soap. Rinse with water.    If you are a woman, always wipe the genital area from front to back.    Urinate frequently. Avoid holding urine in the bladder for a long time.    Always urinate after sexual intercourse.  Follow-up care  Make a follow-up appointment. And see your health care provider for regular lab tests as directed. Our staff can help you with this.  When to call your health care provider  Call your health care provider right away if you have any of the following:    Decreased urine output or trouble urinating    Severe pain in the lower back or flank    Fever above 101.5 F or shaking chills    Vomiting    Blood in your urine    Dark-colored or foul-smelling urine    Nausea or other problems that prevent you from taking your prescribed medication     3671-6003 [employer]. [address] [place] [address]. All rights reserved. This information is not intended as a substitute for professional medical care. Always follow your healthcare professional's instructions.                Kidney Infection (Adult, Female)    An infection of the kidney is also called \"pyelonephritis.\" It usually starts as a bladder infection (\"cystitis\") that spreads to the kidneys. Pyelonephritis is more serious than a bladder infection. It can cause severe illness if not treated properly.  The usual symptoms include an aching pain in the back, side, or lower abdomen. Other symptoms may include fever, chills, nausea, vomiting, blood in the urine, an urge to urinate, and a burning sensation when urinating. Treatment is usually oral antibiotics, or in more severe cases, intramuscular or IV antibiotics. These are started right away and may be changed once urine culture results determine the infecting organisms.  Home care  The following are general care guidelines:  1. Stay home from work or school. Rest in bed until your fever breaks and you are feeling better.  2. Drink lots of " fluid (at least 6 to 8 glasses a day, unless you must restrict fluids for other medical reasons). This will force the medicine into your urinary system and flush the bacteria out of your body.  3. Avoid sex until you have finished all of your medicine and your symptoms are gone.  4. Avoid caffeine, alcohol, and spicy foods. These foods may irritate the kidney and bladder.  5. You may use acetaminophen or ibuprofen to control pain, unless another pain medicine was prescribed. [NOTE: If you have chronic liver or kidney disease or ever had a stomach ulcer or GI bleeding, talk with your doctor before using these medicines.]  Follow-up care  Follow up with your doctor or as advised by our staff for a repeat urine test in 10 days. This will ensure that your infection is fully cleared.  [NOTE: If you had an X-ray, CT scan, or other diagnostic test, it will be reviewed by a specialist. You will be notified of any new findings that may affect your care.]  When to seek medical care  Get prompt medical attention if any of the following occur:    Fever over 100.4 F (38.0 C) after 48 hours of treatment    No improvement by the third day of treatment    Increasing back or abdominal pain    Repeated vomiting or inability to take oral medicine    Weakness, dizziness, or fainting    7645-9283 [employer]. [address] [place] [address], [place] [address]. All rights reserved. This information is not intended as a substitute for professional medical care. Always follow your healthcare professional's instructions.                Understanding Urinary Tract Infections (UTIs)  Most UTIs are caused by bacteria, although they may also be caused by viruses or fungi. Bacteria from the bowel are the most common source of infection. The infection may begin because of any of the following:    Sexual activity. During sex, germs can travel from the penis, vagina, or rectum into the urethra.     Germs on the skin outside the rectum may  travel into the urethra. This is more common in women since the rectum and urethra are closer to each other than in men. Wiping from front to back after using the toilet and keeping the area clean can help prevent germs from getting to the urethra.    Blockage of urine flow through the urinary tract. If urine sits too long, germs may begin to grow out of control.      Parts of the urinary tract  The infection can occur in any part of the urinary tract.    The kidneys collect and store urine.    The ureters carry urine from the kidneys to the bladder.    The bladder holds urine until you are ready to let it out.    The urethra carries urine from the bladder out of the body. It is shorter in women, so bacteria can move through it more easily. The urethra is longer in men, so a UTI is less likely to reach the bladder or kidneys in men.    4751-0386 [employer]. [address] [place] [address]. All rights reserved. This information is not intended as a substitute for professional medical care. Always follow your healthcare professional's instructions.

## 2017-03-20 NOTE — LETTER
Transition Communication Hand-off for Care Transitions to Next Level of Care Provider    Name: Nena Underwood  MRN #: [id number]  Primary Care Provider: Jorge L Tucker     Primary Clinic: [address] [address]     Reason for Hospitalization:  Acute pyelonephritis [N10]  Admit Date/Time: 3/20/2017  9:10 PM  Discharge Date: 3/23/17  Payor Source: Payor: BCBS / Plan: BLUE CROSS QHP / Product Type: Indemnity /     Reason for Communication Hand-off Referral: Other renal transplant 2010    Discharge Plan:       Concern for non-adherence with plan of care:   Y/N no  Follow-up specialty is recommended: Yes    Follow-up plan:  Future Appointments  Date Time Provider Department Center   3/28/2017 11:20 AM Miguelina Bradford PA-C [facility]    5/3/2017 8:00 AM Tracy Loving MD [facility]   6/20/2017 3:40 PM Walter Chong MD [facility]       Any outstanding tests or procedures:        Referrals     Future Labs/Procedures    Urology Adult Referral             Key Recommendations:  See AVS    Laquita Harrell RN, BSN  Care Coordinator [facility] [place] [facility]  Pager: [phone]  Phone: [phone]    AVS/Discharge Summary is the source of truth; this is a helpful guide for improved communication of patient story

## 2017-03-20 NOTE — IP AVS SNAPSHOT
[address] [address]    Phone:  [phone]                                       After Visit Summary   3/20/2017    Nena Underwood    MRN: [id number]           After Visit Summary Signature Page     I have received my discharge instructions, and my questions have been answered. I have discussed any challenges I see with this plan with the nurse or doctor.    ..........................................................................................................................................  Patient/Patient Representative Signature      ..........................................................................................................................................  Patient Representative Print Name and Relationship to Patient    ..................................................               ................................................  Date                                            Time    ..........................................................................................................................................  Reviewed by Signature/Title    ...................................................              ..............................................  Date                                                            Time

## 2017-03-21 ENCOUNTER — APPOINTMENT (OUTPATIENT)
Dept: CT IMAGING | Facility: CLINIC | Age: 42
DRG: 698 | End: 2017-03-21
Attending: EMERGENCY MEDICINE
Payer: COMMERCIAL

## 2017-03-21 PROBLEM — N10 PYELONEPHRITIS, ACUTE: Status: ACTIVE | Noted: 2017-03-21

## 2017-03-21 LAB
ANION GAP SERPL CALCULATED.3IONS-SCNC: 10 MMOL/L (ref 3–14)
BUN SERPL-MCNC: 20 MG/DL (ref 7–30)
CALCIUM SERPL-MCNC: 7.6 MG/DL (ref 8.5–10.1)
CHLORIDE SERPL-SCNC: 114 MMOL/L (ref 94–109)
CO2 SERPL-SCNC: 17 MMOL/L (ref 20–32)
CREAT SERPL-MCNC: 1.32 MG/DL (ref 0.52–1.04)
ERYTHROCYTE [DISTWIDTH] IN BLOOD BY AUTOMATED COUNT: 13.3 % (ref 10–15)
GFR SERPL CREATININE-BSD FRML MDRD: 44 ML/MIN/1.7M2
GLUCOSE SERPL-MCNC: 88 MG/DL (ref 70–99)
HCT VFR BLD AUTO: 27.8 % (ref 35–47)
HGB BLD-MCNC: 9.7 G/DL (ref 11.7–15.7)
LACTATE BLD-SCNC: 0.8 MMOL/L (ref 0.7–2.1)
MCH RBC QN AUTO: 35.8 PG (ref 26.5–33)
MCHC RBC AUTO-ENTMCNC: 34.9 G/DL (ref 31.5–36.5)
MCV RBC AUTO: 103 FL (ref 78–100)
PLATELET # BLD AUTO: 200 10E9/L (ref 150–450)
POTASSIUM SERPL-SCNC: 3.6 MMOL/L (ref 3.4–5.3)
RBC # BLD AUTO: 2.71 10E12/L (ref 3.8–5.2)
SODIUM SERPL-SCNC: 141 MMOL/L (ref 133–144)
WBC # BLD AUTO: 12.5 10E9/L (ref 4–11)

## 2017-03-21 PROCEDURE — 87040 BLOOD CULTURE FOR BACTERIA: CPT | Performed by: PHYSICIAN ASSISTANT

## 2017-03-21 PROCEDURE — 83605 ASSAY OF LACTIC ACID: CPT | Performed by: INTERNAL MEDICINE

## 2017-03-21 PROCEDURE — 36415 COLL VENOUS BLD VENIPUNCTURE: CPT | Performed by: INTERNAL MEDICINE

## 2017-03-21 PROCEDURE — 25000128 H RX IP 250 OP 636: Performed by: EMERGENCY MEDICINE

## 2017-03-21 PROCEDURE — 25000128 H RX IP 250 OP 636: Performed by: INTERNAL MEDICINE

## 2017-03-21 PROCEDURE — 85027 COMPLETE CBC AUTOMATED: CPT | Performed by: PHYSICIAN ASSISTANT

## 2017-03-21 PROCEDURE — 25000132 ZZH RX MED GY IP 250 OP 250 PS 637: Performed by: INTERNAL MEDICINE

## 2017-03-21 PROCEDURE — 25000125 ZZHC RX 250: Performed by: INTERNAL MEDICINE

## 2017-03-21 PROCEDURE — 74176 CT ABD & PELVIS W/O CONTRAST: CPT

## 2017-03-21 PROCEDURE — 25000131 ZZH RX MED GY IP 250 OP 636 PS 637: Performed by: INTERNAL MEDICINE

## 2017-03-21 PROCEDURE — 80048 BASIC METABOLIC PNL TOTAL CA: CPT | Performed by: PHYSICIAN ASSISTANT

## 2017-03-21 PROCEDURE — 25000132 ZZH RX MED GY IP 250 OP 250 PS 637: Performed by: EMERGENCY MEDICINE

## 2017-03-21 PROCEDURE — 25000128 H RX IP 250 OP 636: Performed by: PHYSICIAN ASSISTANT

## 2017-03-21 PROCEDURE — 99233 SBSQ HOSP IP/OBS HIGH 50: CPT | Performed by: PHYSICIAN ASSISTANT

## 2017-03-21 PROCEDURE — 12000001 ZZH R&B MED SURG/OB UMMC

## 2017-03-21 PROCEDURE — 36415 COLL VENOUS BLD VENIPUNCTURE: CPT | Performed by: PHYSICIAN ASSISTANT

## 2017-03-21 PROCEDURE — 99223 1ST HOSP IP/OBS HIGH 75: CPT | Mod: AI | Performed by: INTERNAL MEDICINE

## 2017-03-21 RX ORDER — PROCHLORPERAZINE MALEATE 5 MG
5-10 TABLET ORAL EVERY 6 HOURS PRN
Status: DISCONTINUED | OUTPATIENT
Start: 2017-03-21 | End: 2017-03-23 | Stop reason: HOSPADM

## 2017-03-21 RX ORDER — NALOXONE HYDROCHLORIDE 0.4 MG/ML
.1-.4 INJECTION, SOLUTION INTRAMUSCULAR; INTRAVENOUS; SUBCUTANEOUS
Status: DISCONTINUED | OUTPATIENT
Start: 2017-03-21 | End: 2017-03-23 | Stop reason: HOSPADM

## 2017-03-21 RX ORDER — SODIUM CHLORIDE 9 MG/ML
INJECTION, SOLUTION INTRAVENOUS CONTINUOUS
Status: DISCONTINUED | OUTPATIENT
Start: 2017-03-21 | End: 2017-03-22

## 2017-03-21 RX ORDER — CEFTRIAXONE 1 G/1
1 INJECTION, POWDER, FOR SOLUTION INTRAMUSCULAR; INTRAVENOUS EVERY 24 HOURS
Status: DISCONTINUED | OUTPATIENT
Start: 2017-03-22 | End: 2017-03-21

## 2017-03-21 RX ORDER — PREDNISONE 5 MG/1
5 TABLET ORAL DAILY
Status: DISCONTINUED | OUTPATIENT
Start: 2017-03-21 | End: 2017-03-23 | Stop reason: HOSPADM

## 2017-03-21 RX ORDER — SULFAMETHOXAZOLE AND TRIMETHOPRIM 400; 80 MG/1; MG/1
1 TABLET ORAL DAILY
Status: DISCONTINUED | OUTPATIENT
Start: 2017-03-21 | End: 2017-03-23 | Stop reason: HOSPADM

## 2017-03-21 RX ORDER — PROCHLORPERAZINE 25 MG
25 SUPPOSITORY, RECTAL RECTAL EVERY 12 HOURS PRN
Status: DISCONTINUED | OUTPATIENT
Start: 2017-03-21 | End: 2017-03-23 | Stop reason: HOSPADM

## 2017-03-21 RX ORDER — OXYCODONE HYDROCHLORIDE 5 MG/1
5 TABLET ORAL EVERY 6 HOURS PRN
Status: DISCONTINUED | OUTPATIENT
Start: 2017-03-21 | End: 2017-03-23 | Stop reason: HOSPADM

## 2017-03-21 RX ORDER — PIPERACILLIN SODIUM, TAZOBACTAM SODIUM 2; .25 G/10ML; G/10ML
2.25 INJECTION, POWDER, LYOPHILIZED, FOR SOLUTION INTRAVENOUS EVERY 6 HOURS
Status: DISCONTINUED | OUTPATIENT
Start: 2017-03-21 | End: 2017-03-23

## 2017-03-21 RX ORDER — IBUPROFEN 200 MG
600 TABLET ORAL EVERY 6 HOURS PRN
Status: DISCONTINUED | OUTPATIENT
Start: 2017-03-21 | End: 2017-03-23 | Stop reason: HOSPADM

## 2017-03-21 RX ORDER — ONDANSETRON 4 MG/1
4 TABLET, ORALLY DISINTEGRATING ORAL EVERY 6 HOURS PRN
Status: DISCONTINUED | OUTPATIENT
Start: 2017-03-21 | End: 2017-03-23 | Stop reason: HOSPADM

## 2017-03-21 RX ORDER — ACETAMINOPHEN 325 MG/1
650 TABLET ORAL EVERY 4 HOURS PRN
Status: DISCONTINUED | OUTPATIENT
Start: 2017-03-21 | End: 2017-03-23 | Stop reason: HOSPADM

## 2017-03-21 RX ORDER — TACROLIMUS 0.5 MG/1
0.5 CAPSULE, GELATIN COATED ORAL 2 TIMES DAILY
Status: DISCONTINUED | OUTPATIENT
Start: 2017-03-21 | End: 2017-03-23 | Stop reason: HOSPADM

## 2017-03-21 RX ORDER — HYDROCODONE BITARTRATE AND ACETAMINOPHEN 5; 325 MG/1; MG/1
1-2 TABLET ORAL EVERY 4 HOURS PRN
Status: DISCONTINUED | OUTPATIENT
Start: 2017-03-21 | End: 2017-03-21

## 2017-03-21 RX ORDER — AMOXICILLIN 250 MG
1-2 CAPSULE ORAL 2 TIMES DAILY
Status: DISCONTINUED | OUTPATIENT
Start: 2017-03-21 | End: 2017-03-23 | Stop reason: HOSPADM

## 2017-03-21 RX ORDER — ONDANSETRON 2 MG/ML
4 INJECTION INTRAMUSCULAR; INTRAVENOUS EVERY 6 HOURS PRN
Status: DISCONTINUED | OUTPATIENT
Start: 2017-03-21 | End: 2017-03-23 | Stop reason: HOSPADM

## 2017-03-21 RX ORDER — ACETAMINOPHEN 500 MG
1000 TABLET ORAL ONCE
Status: COMPLETED | OUTPATIENT
Start: 2017-03-21 | End: 2017-03-21

## 2017-03-21 RX ORDER — AZATHIOPRINE 50 MG/1
100 TABLET ORAL DAILY
Status: DISCONTINUED | OUTPATIENT
Start: 2017-03-21 | End: 2017-03-23 | Stop reason: HOSPADM

## 2017-03-21 RX ADMIN — SODIUM CHLORIDE 1000 ML: 9 INJECTION, SOLUTION INTRAVENOUS at 00:00

## 2017-03-21 RX ADMIN — SODIUM CHLORIDE 500 ML: 9 INJECTION, SOLUTION INTRAVENOUS at 03:05

## 2017-03-21 RX ADMIN — PREDNISONE 5 MG: 5 TABLET ORAL at 09:24

## 2017-03-21 RX ADMIN — SENNOSIDES AND DOCUSATE SODIUM 2 TABLET: 8.6; 5 TABLET ORAL at 09:24

## 2017-03-21 RX ADMIN — SODIUM CHLORIDE: 9 INJECTION, SOLUTION INTRAVENOUS at 17:15

## 2017-03-21 RX ADMIN — ACETAMINOPHEN 1000 MG: 500 TABLET, FILM COATED ORAL at 00:26

## 2017-03-21 RX ADMIN — ACETAMINOPHEN 650 MG: 325 TABLET, FILM COATED ORAL at 09:23

## 2017-03-21 RX ADMIN — AZATHIOPRINE 100 MG: 50 TABLET ORAL at 09:23

## 2017-03-21 RX ADMIN — TACROLIMUS 0.5 MG: 0.5 CAPSULE, GELATIN COATED ORAL at 09:24

## 2017-03-21 RX ADMIN — SULFAMETHOXAZOLE AND TRIMETHOPRIM 1 TABLET: 400; 80 TABLET ORAL at 09:23

## 2017-03-21 RX ADMIN — CEFTRIAXONE 1 G: 1 INJECTION, POWDER, FOR SOLUTION INTRAMUSCULAR; INTRAVENOUS at 00:00

## 2017-03-21 RX ADMIN — TACROLIMUS 0.5 MG: 0.5 CAPSULE, GELATIN COATED ORAL at 20:06

## 2017-03-21 RX ADMIN — SODIUM CHLORIDE: 9 INJECTION, SOLUTION INTRAVENOUS at 05:16

## 2017-03-21 RX ADMIN — ACETAMINOPHEN 650 MG: 325 TABLET, FILM COATED ORAL at 17:10

## 2017-03-21 RX ADMIN — SODIUM CHLORIDE: 9 INJECTION, SOLUTION INTRAVENOUS at 10:52

## 2017-03-21 RX ADMIN — PIPERACILLIN SODIUM,TAZOBACTAM SODIUM 2.25 G: 2; .25 INJECTION, POWDER, FOR SOLUTION INTRAVENOUS at 18:17

## 2017-03-21 ASSESSMENT — PAIN DESCRIPTION - DESCRIPTORS: DESCRIPTORS: ACHING

## 2017-03-21 NOTE — PLAN OF CARE
Problem: Goal Outcome Summary  Goal: Goal Outcome Summary  Outcome: No Change  Pt admitted to 5a at 0145 from [facility]. Tachycardic 110's-120's, tmax 102.5 currently afebrile, soft BP's. Triggered sepsis, lactic back at 0.8. C/o right flank/abdominal pain-improving, pt declining pain meds at this time. 500 ml bolus given, MIVF infusing at 125 ml/hr.  Rochephin q24h.  cultures pendiing. Void x 1. Plan for transplant consult today.

## 2017-03-21 NOTE — ED NOTES
Pt arrived in the ED with reports of severe flank pain, urinary frequency, dysuria, and fever that started this afternoon.  Pt is s/p kidney transplant in 2010.  Pt is awake, alert, and oriented x4; pt is ambulatory with a steady gait.

## 2017-03-21 NOTE — PLAN OF CARE
Problem: Goal Outcome Summary  Goal: Goal Outcome Summary  Outcome: Improving  VSS w/soft BP. TMAX of 101.6 this morning, Tylenol given w/good results. C/o RLQ pain, no rebound tenderness. MD notified. Per pt, Tylenol was adequate for pain relief.  C/o nausea, declined intervention.  Tolerating a reg diet w/fair intake. Up ad rimma. MIVF at 125mL/hr. Continue to monitor and follow POC.

## 2017-03-21 NOTE — ED PROVIDER NOTES
History     Chief Complaint   Patient presents with     Flank Pain     Fever     HPI  Nena Underwood is a 42 year old female with a history of anemia, hyperparathyroidism, renal aplasia s/p kidney transplant (), and TB who presents for evaluation of back pain and fever. Patient complains of right lower back pain, mostly localized over her kidney, as well as dysuria that began at noon today. She also complains of fever, up to 101 degrees at home (on arrival 100.3F), as well as, headache and diarrhea. She did not notice a foul odor to her urine. She denies vomiting, cough, shortness of breath, or vaginal discharge. She is on her menstrual period, though states it is only spotting now. She took one Tylenol at 2 PM with no relief.     I have reviewed the Medications, Allergies, Past Medical and Surgical History, and Social History in the [employer] system.  Past Medical History   Diagnosis Date     Anemia      High risk medication use      Hyperparathyroidism, secondary renal (H)      Immunosuppressed status (H)      IUD (intrauterine device) in place       4/3/14     Kidney replaced by transplant 6/23/10     KIDNEY TRANSPLANT STATUS     Moraxella catarrhalis pneumonia (HCC) 3/28/2016     Pneumonia 2013     requiring hospitalization     Renal aplasia      since birth     Single seizure (H)      felt secondary to uremia     TB (pulmonary tuberculosis)      treated with 4 drug regimen (INH, rifampin, ethambutol and pyrazinamide) for 6 months       Past Surgical History   Procedure Laterality Date     Insert intrauterine device       Paraguard. removed      Transplant kidney recipient living unrelated  6/23/10     Hemodialysis via av fistula       left arm      section  2014     Procedure:  SECTION;;  Surgeon: Griselda Becerra MD;  Location: [facility]       Family History   Problem Relation Age of Onset     Respiratory Paternal Grandmother      PGM had pulmonary TB at age 85,  then  of old age at 89; she lived with [facility]     Eye Disorder Mother      retinal problems       Social History   Substance Use Topics     Smoking status: Never Smoker     Smokeless tobacco: Never Used     Alcohol use No     No current facility-administered medications for this encounter.         Allergies   Allergen Reactions     Nkda [No Known Drug Allergies]        Review of Systems   Constitutional: Positive for fever (up to 101 @ home).   Respiratory: Negative for cough and shortness of breath.    Gastrointestinal: Positive for diarrhea. Negative for vomiting.   Genitourinary: Positive for dysuria. Negative for vaginal discharge.   Musculoskeletal: Positive for back pain (right lower).   Neurological: Positive for headaches.   All other systems reviewed and are negative.      Physical Exam   BP: (!) 99/33  Pulse: 112  Temp: 100.3  F (37.9  C)  Resp: 20  SpO2: 99 %  Physical Exam   Constitutional: She appears distressed (appears ill but not toxic).   HENT:   Head: Atraumatic.   Mouth/Throat: Oropharynx is clear and moist. No oropharyngeal exudate.   Eyes: Pupils are equal, round, and reactive to light. No scleral icterus.   Cardiovascular: Normal heart sounds and intact distal pulses.    tachy   Pulmonary/Chest: Breath sounds normal. No respiratory distress.   Abdominal: Soft. There is tenderness.       Musculoskeletal: She exhibits no edema or tenderness.   Skin: Skin is warm. No rash noted. She is not diaphoretic.       ED Course     ED Course     Procedures             Critical Care time:  none               Labs Ordered and Resulted from Time of ED Arrival Up to the Time of Departure from the ED   CBC WITH PLATELETS DIFFERENTIAL - Abnormal; Notable for the following:        Result Value    RBC Count 3.06 (*)     Hemoglobin 11.0 (*)     Hematocrit 31.3 (*)      (*)     MCH 35.9 (*)     Absolute Lymphocytes 0.7 (*)     All other components within normal limits   COMPREHENSIVE METABOLIC PANEL -  Abnormal; Notable for the following:     Creatinine 1.22 (*)     GFR Estimate 48 (*)     GFR Estimate If Black 58 (*)     All other components within normal limits   ROUTINE UA WITH MICROSCOPIC - Abnormal; Notable for the following:     Blood Urine Trace (*)     Protein Albumin Urine 10 (*)     Leukocyte Esterase Urine Large (*)     WBC Urine >182 (*)     RBC Urine 12 (*)     WBC Clumps Present (*)     Bacteria Urine Few (*)     Mucous Urine Present (*)     All other components within normal limits   HCG QUALITATIVE URINE   URINE CULTURE AEROBIC BACTERIAL   URINE CULTURE AEROBIC BACTERIAL       Assessments & Plan (with Medical Decision Making)   A urinalysis did come back consistent with infection.  The patient is febrile here.  I do think she likely has pyelonephritis in her transplanted kidney.  She has been treated with several courses of antibiotics over the last 2 months for UTI/pyelo.  Cultures have shown sensitivities to the antibiotics she s been treated with.  I m concerned she might have a possible nidus given repeated infections.  A CT will be ordered to evaluate for evidence of kidney stone.  She was given a dose of ceftriaxone here in the ER will be admitted to the hospital for pyelonephritis in her transplant kidney, particularly given the fact that she is febrile, tachycardic, she was quite nauseated earlier but has had no vomiting here.    I have reviewed the nursing notes.    I have reviewed the findings, diagnosis, plan and need for follow up with the patient.    Current Discharge Medication List          Final diagnoses:   Acute pyelonephritis   Kidney replaced by transplant   ICassandra, am serving as a trained medical scribe to document services personally performed by Nicole Umanzor MD, based on the provider's statements to me.   INicole MD, was physically present and have reviewed and verified the accuracy of this note documented by Cassandra Burns.      3/20/2017    [facility], [place], EMERGENCY DEPARTMENT     Nicole Umanzor MD  03/21/17 0128

## 2017-03-21 NOTE — PROGRESS NOTES
[facility] - Internal Medicine Daily Note   Date of Service: 3/21/2017  Patient: Nena Underwood  MRN: [id number]  Admission Date: 3/20/2017  Hospital Day # 0    Assessment & Plan: Nena Underwood is a 42 year old female with h/o renal transplant 2010, h/o treated latent TB and recent admission for transplanted kidney pyelo (mostly sensitive klebsiella) admitted 2/4-2/6/17 who was admitted with c/o fever, pain over transplanted kidney similar to last admit. She has had UTI x4 in past 2 months. Treated with cipro last 3 times.     Pyelonephritis or transplanted kidney with recurrent UTI. Presented with fever, pain over transplanted kidney. Started on ceftriaxone in the ED 3/21. CT AT 3/21 new mild peritransplant soft tissue stranding with persistent mild hydronephrosis but increased mild-to-moderate hydroureter. Findings may be suggestive of transplant infection. Mild irregularity and thickening of the urinary bladder wall. No transplant or ureteral stone. WBC normal. UA with nitrites, moderate LE, moderate blood, 10-25 WBC. UC pending. Ongoing fevers despite antibiotics  - Transplant nephrology and urology consulted  - Check blood cultures  - Transition to Zosyn given ongoing fevers  - Follow UC  - May need suppressive antibiotics  - Will order for oxycodone 5 mg q6h prn although patient is declining at this time     S/p renal transplant with SAHIL: Cr 1.2 on admission. Repeat labs ordered, but did not get drawn. Likely 2/2 UTI  - Continue NS overnight   - Trend in am  - Continue azathioprine/tacrolimus  - Consider transplant doppler if not improving    Consulting Services: Transplant nephrology, urology     CODE: Full  DVT: Mechanical   Diet/fluids: Regular diet and IVF with NS  Disposition: Pending ongoing improvement and workup     Case discussed with attending physician, Dr. Jose Elias Crooks  Internal Medicine SHELLY Hospitalist   [facility]   Pager: [phone]    Team:  [facility] 2  Page Cross Cover after 5 pm: pager [phone]   ___________________________________________________________________    Subjective & Interval Hx:  Continues to have right sided pain. Concerned it is related to having her IUD removed, although IUD was removed about a week ago and pain stated yesterday. Having ongoing urinary symptoms. Denies chest pain, dyspnea, palpitations.      Last 24 hr care team notes reviewed.   ROS:  4 point ROS including Respiratory, CV, GI and , other than that noted in the HPI, is negative.    Medications: Reviewed in EPIC. List below for reference    Physical Exam:    /67 (BP Location: Right arm)  Pulse 112  Temp 102.2  F (39  C) (Oral)  Resp 18  Ht 1.524 m (5')  Wt 41.7 kg (92 lb)  LMP 03/20/2017 (Exact Date)  SpO2 99%  Breastfeeding? No  BMI 17.97 kg/m2     GENERAL: Alert and oriented x3. Appears uncomfortable  HEENT: Anicteric sclera. Mucous membranes moist.   CV: RRR. S1, S2. No murmurs appreciated.   RESPIRATORY: Effort normal on room air. Lungs CTAB with no wheezing, rales, rhonchi.   GI: Abdomen soft and non distended with normoactive bowel sounds present in all quadrants. RLQ tenderness. No rebound, guarding.   NEUROLOGICAL: No focal deficits. Moves all extremities.    EXTREMITIES: No peripheral edema. Intact bilateral pedal pulses.   SKIN: No jaundice. No rashes.     Labs & Studies of Note: I personally reviewed pertinent labs and studies.

## 2017-03-21 NOTE — PHARMACY-ADMISSION MEDICATION HISTORY
Admission medication history interview status for the 3/20/2017 admission is complete. See Epic admission navigator for allergy information, pharmacy, prior to admission medications and immunization status.     Medication history interview sources: Patient, Epic chart review, [facility]    Changes made to PTA medication list (reason)  Added: none  Deleted: none  Changed: none    Additional medication history information (including reliability of information, actions taken by pharmacist):    The patient's medication history information seems fairly reliable.     The patient reported not using Pyridium for past couple of weeks.     The patient had an influenza vaccination on 2/2/2017 per [facility].     The patient reported filling her medications with [employer] and at [employer] in [place].       Prior to Admission medications    Medication Sig Last Dose Taking? Auth Provider   predniSONE (DELTASONE) 5 MG tablet TAKE ONE TABLET BY MOUTH EVERY DAY 3/19/2017 at 1200 Yes Walter Chong MD   PROGRAF 0.5 MG PO CAPSULE Take 1 capsule (0.5 mg) by mouth 2 times daily 3/20/2017 at 0800 Yes Walter Chong MD   azaTHIOprine (IMURAN) 50 MG tablet Take 2 tablets (100 mg) by mouth daily 3/19/2017 at 1200 Yes Mendy Alvarado MD   sulfamethoxazole-trimethoprim (BACTRIM,SEPTRA) 400-80 MG per tablet Take 1 tablet by mouth daily 3/20/2017 at 1300 Yes Janis Gonzalez MD   Acetaminophen (TYLENOL PO) Take 500 mg by mouth every 4 hours as needed for mild pain or fever 3/20/2017 at 1400 Yes Reported, Patient   phenazopyridine (PYRIDIUM) 100 MG tablet Take 1 tablet (100 mg) by mouth 3 times daily as needed for urinary tract discomfort  at The patient reported not using for past 2 weeks.  Slim Kinsey MD         Medication history completed by: Brit Kilgore, PharmD IV Student

## 2017-03-21 NOTE — CONSULTS
Urology Consult    Name: Nena Underwood    MRN: [id number]   YOB: 1975       We were asked to see Nena Underwood at the request of Dr. Crooks for evaluation and treatment of the following chief complaint.        Chief Complaint:   Hydronephrosis of transplant kidney  UTI    History is obtained from the patient and chart review          History of Present Illness:   Nena Underwood is a 42 year old female with PMH of ESRD of unknown etiology s/p LDKT in 2010 on immunosuppression and urologic hx of recurrent UTIs. She is currently admitted with fever (102.5) and pain over her transplant kidney and UA with +nitrite, UCx with GNR.  Has had 4+ UTIs over past few months, last few have been Klebsiella pneumoniae. Currently on ceftriaxone.  Urology was consulted with CT finding of possible hydronephrosis of transplant kidney.  Present to small degree in 9/2015, appears it is more pronounced now. Cr baseline is 0.9, up to 1.2 on this admission.      She reports that she has had 4 UTIs over past two months, mostly treated with cipro.  Prior to that, her most recent UTI was in 2014 and before that in 2012.  She is not sure why she has suddenly developed so many infections.  Last seen by Dr. Van in clinic in 2012 for recurrent UTIs, was on cranberry supplements at that time.           Past Medical History:     Past Medical History   Diagnosis Date     Anemia      High risk medication use      Hyperparathyroidism, secondary renal (H)      Immunosuppressed status (H)      IUD (intrauterine device) in place       4/3/14     Kidney replaced by transplant 6/23/10     KIDNEY TRANSPLANT STATUS     Moraxella catarrhalis pneumonia (HCC) 3/28/2016     Pneumonia 2/2013     requiring hospitalization     Renal aplasia      since birth     Single seizure (H) 2006     felt secondary to uremia     TB (pulmonary tuberculosis) 2006     treated with 4 drug regimen (INH, rifampin, ethambutol and pyrazinamide) for 6 months             Past Surgical History:     Past Surgical History   Procedure Laterality Date     Insert intrauterine device       Paraguard. removed      Transplant kidney recipient living unrelated  6/23/10     Hemodialysis via av fistula       left arm      section  2014     Procedure:  SECTION;;  Surgeon: Griselda Becerra MD;  Location:  L+D            Social History:     Social History   Substance Use Topics     Smoking status: Never Smoker     Smokeless tobacco: Never Used     Alcohol use No            Family History:     Family History   Problem Relation Age of Onset     Respiratory Paternal Grandmother      PGRENATO had pulmonary TB at age 85, then  of old age at 89; she lived with Nena     Eye Disorder Mother      retinal problems            Allergies:     Allergies   Allergen Reactions     Nkda [No Known Drug Allergies]             Medications:     Current Facility-Administered Medications   Medication     naloxone (NARCAN) injection 0.1-0.4 mg     0.9% sodium chloride infusion     acetaminophen (TYLENOL) tablet 650 mg     ibuprofen (ADVIL/MOTRIN) tablet 600 mg     HYDROcodone-acetaminophen (NORCO) 5-325 MG per tablet 1-2 tablet     senna-docusate (SENOKOT-S;PERICOLACE) 8.6-50 MG per tablet 1-2 tablet     ondansetron (ZOFRAN-ODT) ODT tab 4 mg    Or     ondansetron (ZOFRAN) injection 4 mg     prochlorperazine (COMPAZINE) injection 5-10 mg    Or     prochlorperazine (COMPAZINE) tablet 5-10 mg    Or     prochlorperazine (COMPAZINE) Suppository 25 mg     [START ON 3/22/2017] cefTRIAXone (ROCEPHIN) 1 g vial to attach to  mL bag for ADULTS or NS 50 mL bag for PEDS     azaTHIOprine (IMURAN) tablet 100 mg     predniSONE (DELTASONE) tablet 5 mg     tacrolimus (PROGRAF BRAND) capsule 0.5 mg     sulfamethoxazole-trimethoprim (BACTRIM/SEPTRA) 400-80 MG per tablet 1 tablet             Review of Systems:    ROS: 10 point ROS neg other than the symptoms noted above in the HPI            Physical Exam:   VS:  T: 100.6    HR: 112    BP: 99/56    RR: 18   GEN:  AOx3.  NAD.    CV:  RRR  LUNGS: Non-labored breathing.   BACK:  No midline or CVA tenderness.  ABD:  Soft.  ND.  No rebound or guarding.  Tender to palpation over her transplant kidney  EXT:  Warm, well perfused.  No edema.  SKIN:  Warm.  Dry.  No rashes.  NEURO:  CN grossly intact.            Data:   All laboratory data reviewed:      Recent Labs  Lab 03/20/17 2136   WBC 6.5   HGB 11.0*          Recent Labs  Lab 03/20/17 2136      POTASSIUM 3.8   CHLORIDE 108   CO2 22   BUN 22   CR 1.22*   GLC 85   KARINA 8.8       Recent Labs  Lab 03/20/17 2138 03/20/17  1810   COLOR Light Yellow Yellow   APPEARANCE Slightly Cloudy Cloudy   URINEGLC Negative Negative   URINEBILI Negative Negative   URINEKETONE Negative Negative   SG 1.009 <=1.005   URINEPH 5.0 5.5   PROTEIN 10* Trace*   UROBILINOGEN  --  0.2   NITRITE Negative Positive*   LEUKEST Large* Moderate*   RBCU 12* O - 2   WBCU >182* 10-25*       All pertinent imaging reviewed:    Results for orders placed or performed during the hospital encounter of 03/20/17   Abd/pelvis CT no contrast - Stone Protocol    Narrative    EXAMINATION: CT ABDOMEN PELVIS W/O CONTRAST, 3/21/2017 12:57 AM    TECHNIQUE:  Helical CT images from the lung bases through the  symphysis pubis were obtained without IV contrast.     COMPARISON: CT , 9/27/2015, ultrasound 2/4/2017 3/21/2016    HISTORY: right flank pain, kidney transplant, repeated infections  -eval kidney stone    FINDINGS:    Abdomen and pelvis:     The liver, gallbladder, spleen, adrenal, and pancreas are  unremarkable. Atrophic native kidneys.    Right lower quadrant transplant kidney. New mild peritransplant  stranding. Persistent mild transplant hydronephrosis. Increased mild  to moderate hydroureter. Mild irregularity and thickening of the  urinary bladder wall.     No evidence of bowel obstruction or abnormal bowel thickening.  The  appendix is not clearly visualized but there is no CT evidence of  appendicitis. Unremarkable pelvic organs. Small subcentimeter  mesenteric lymph nodes, likely reactive.    Small fat-containing ventral hernia.    Lung bases: Unremarkable.    Bones and soft tissues: Acute osseous findings.      Impression    IMPRESSION:  1. New mild peritransplant soft tissue stranding with persistent mild  hydronephrosis but increased mild-to-moderate hydroureter. Findings  may be suggestive of transplant infection.  2. Mild irregularity and thickening of the urinary bladder wall.  Findings can be suggestive of cystitis.  3. No transplant or ureteral stone.    I have personally reviewed the examination and initial interpretation  and I agree with the findings.    PLACIDO GRUBER MD            Impression and Plan:   Impression:   Nena Underwood is a 42 year old female with PMH of ESRD of unknown etiology s/p LDKT in 2010 on immunosuppression and urologic hx of recurrent UTIs; currently admitted with another febrile UTI (final culture pending but previous have been Klebsiella).  CT shows mild worsening of her transplant kidney, patient is still making normal amount of urine.      Plan:     - Recommend placing guevara catheter to maximize drainage    - Obtain lasix renogram tomorrow. This will demonstrate if there is any obstructive component to the hydronephrosis.  Suspect this is more likely related to reflux or possibly infection     - Urology will continue to follow. Please contact resident/PA on call with any questions or concerns.         This patient's exam findings, labs, and imaging discussed with urology staff surgeon Dr. Damon, who developed the treatment plan.    Rafa Almazan MD  Urology Resident

## 2017-03-21 NOTE — H&P
[facility] History and Physical  Department of Internal Medicine    Patient Name: Nena Underwood MRN# [id number]   Age: 42 year old YOB: 1975     Date of Admission:3/20/2017    Home clinic:   Primary care provider: Jorge L Tucker  Date of Service: 3/21/2017  Admitting Team: [facility] night         Assessment and Plan:   Nena Underwood is a 42 year old female with h/o renal transplant 2010, h/o treated latent TB and recent admission for transplanted kidney pyelo (mostly sensitive klebsiella) admitted here 2/4-2/6/17 now with c/o fever, pain over transplanted kidney similar to last admit. She has had UTI x 4 in past 2 months. Treated with cipro last 3 times.    # pyelonephritis or transplanted kidney with recurrent UTI.  - transplant nephrology consult in AM, consider urology  - ceftriaxone IV 1g q24 pending cultures. May need different oral agent this course or consider PICC with IV abx  - consider stone nidus for recurrent infections. Getting CT to r/o stone  - consider chronic suppression abx    # s/p renal transplant with SAHIL  - hydrate with NS overnight  - recheck AM  - continue azathioprine/tacrolimus  - consider transplant doppler in AM if not improving    CODE: full  Diet/IVF: regular. IVF with NS  DVT ppx: ambulatory  Disposition/Admission Status: Inpatient. Likely 2-3 day stay. Plan for home d/c. Goals resolve fever, infection, find source of recurrent UTI. Pt wants to go home as soon as safe.    Alvarez Valencia  Internal Medicine Hospitalist & Staff Physician  [facility]  Pager: [phone]           Chief Complaint:   Fever, transplant kidney pain         HPI:   Nena Underwood is a 42 year old female with h/o renal transplant 2010, h/o treated latent TB and recent admission for transplanted kidney pyelo (mostly sensitive klebsiella) admitted here 2/4-2/6/17 now with c/o fever, pain over transplanted kidney similar to last admit. She has had UTI x 4 in past 2 months.  Treated with cipro last 3 times. Pt concerned that UTI is being caused by IUD and wants out. Saw gyn who attempted, but unsuccessful (one was removed ).     She started developing symptoms on 3/20 at her noon lunch break with dysuria, pain over transplanted kidney and fever to 101. Had T102.5 on arrival to ED. Feeling more weak, sharp pain with urination. No hematuria.         Past Medical History:     Past Medical History   Diagnosis Date     Anemia      High risk medication use      Hyperparathyroidism, secondary renal (H)      Immunosuppressed status (H)      IUD (intrauterine device) in place       4/3/14     Kidney replaced by transplant 6/23/10     KIDNEY TRANSPLANT STATUS     Moraxella catarrhalis pneumonia (HCC) 3/28/2016     Pneumonia 2013     requiring hospitalization     Renal aplasia      since birth     Single seizure (H)      felt secondary to uremia     TB (pulmonary tuberculosis)      treated with 4 drug regimen (INH, rifampin, ethambutol and pyrazinamide) for 6 months              Past Surgical History:     Past Surgical History   Procedure Laterality Date     Insert intrauterine device       Paraguard. removed      Transplant kidney recipient living unrelated  6/23/10     Hemodialysis via av fistula       left arm      section  2014     Procedure:  SECTION;;  Surgeon: Griselda Becerra MD;  Location: [facility] L[facility]              Social History:     Social History     Social History     Marital status:      Spouse name: N/A     Number of children: N/A     Years of education: N/A     Occupational History           [employer]     Social History Main Topics     Smoking status: Never Smoker     Smokeless tobacco: Never Used     Alcohol use No     Drug use: No     Sexual activity: Yes     Partners: Male     Birth control/ protection: IUD     Other Topics Concern      Service No     Blood Transfusions No      Weight Concern No     Exercise No     Bike Helmet Not Asked     na     Seat Belt Yes     Social History Narrative    Ms. Underwood emigrated from the [facility] in May, 2007. She is currently living with her  in [place], [place].  She works for the [employer] in [place] ).             Family History:     Family History   Problem Relation Age of Onset     Respiratory Paternal Grandmother      MARIKA had pulmonary TB at age 85, then  of old age at 89; she lived with [facility]     Eye Disorder Mother      retinal problems            Immunizations:     Immunization History   Administered Date(s) Administered     Hepatitis B 2008, 2008, 10/03/2008     Influenza (IIV3) 2008, 2011, 2012, 10/17/2013     Influenza Vaccine IM 3yrs+ 4 Valent IIV4 2017     Mantoux 2008     TDAP (ADACEL AGES 11-64) 2013              Allergies:      Allergies   Allergen Reactions     Nkda [No Known Drug Allergies]             Medications:     Prior to Admission Medications   Prescriptions Last Dose Informant Patient Reported? Taking?   Acetaminophen (TYLENOL PO)   Yes No   Sig: Take 500 mg by mouth every 4 hours as needed for mild pain or fever   PROGRAF 0.5 MG PO CAPSULE   No No   Sig: Take 1 capsule (0.5 mg) by mouth 2 times daily   azaTHIOprine (IMURAN) 50 MG tablet   No No   Sig: Take 2 tablets (100 mg) by mouth daily   phenazopyridine (PYRIDIUM) 100 MG tablet   No No   Sig: Take 1 tablet (100 mg) by mouth 3 times daily as needed for urinary tract discomfort   predniSONE (DELTASONE) 5 MG tablet   No No   Sig: TAKE ONE TABLET BY MOUTH EVERY DAY   sulfamethoxazole-trimethoprim (BACTRIM,SEPTRA) 400-80 MG per tablet   No No   Sig: Take 1 tablet by mouth daily      Facility-Administered Medications: None             Review of Systems:     A complete ROS was performed and is negative other than what is stated in the HPI.          Physical Exam:   Blood pressure 109/65, pulse 112,  temperature 102  F (38.9  C), temperature source Oral, resp. rate 20, last menstrual period 03/20/2017, SpO2 98 %, not currently breastfeeding.  General: well nourished. Lying in bed with closed eyes, open and appropriately responsive.  HEENT: OP clear, PERRL,   Neck: no LAD, tenderness  Chest/Resp: CTA B  Heart/CV: tachy, no M,R,G  Abdomen/GI: s, nt, nd, NABS  Extremities/MSK: w/d, no edema  Skin: no rashes  Neuro: CARBONE, A & O x 4  Psych: pleasant, alert when awoken, but appears tired    Tubes/Lines/Devices:   Peripheral IV 03/21/17 Right Lower forearm (Active)   Number of days:0            Data:       I spoke with the patient, and I reviewed medical records.      Alvarez Valencia

## 2017-03-21 NOTE — PLAN OF CARE
Problem: Goal Outcome Summary  Goal: Goal Outcome Summary  T 100.1 just now. Zosyn started and Blood culture drawn. Pt appeared to feel better, more talkative.

## 2017-03-21 NOTE — PLAN OF CARE
Problem: Goal Outcome Summary  Goal: Goal Outcome Summary  T 102.2, 650 mg of Tylenol given. Medicine was paged for further intervention if needed. Pt refused ice packs.

## 2017-03-22 ENCOUNTER — APPOINTMENT (OUTPATIENT)
Dept: NUCLEAR MEDICINE | Facility: CLINIC | Age: 42
DRG: 698 | End: 2017-03-22
Attending: PHYSICIAN ASSISTANT
Payer: COMMERCIAL

## 2017-03-22 LAB
ANION GAP SERPL CALCULATED.3IONS-SCNC: 9 MMOL/L (ref 3–14)
BACTERIA SPEC CULT: ABNORMAL
BACTERIA SPEC CULT: ABNORMAL
BUN SERPL-MCNC: 16 MG/DL (ref 7–30)
CALCIUM SERPL-MCNC: 7.6 MG/DL (ref 8.5–10.1)
CHLORIDE SERPL-SCNC: 116 MMOL/L (ref 94–109)
CO2 SERPL-SCNC: 18 MMOL/L (ref 20–32)
CREAT SERPL-MCNC: 1.18 MG/DL (ref 0.52–1.04)
ERYTHROCYTE [DISTWIDTH] IN BLOOD BY AUTOMATED COUNT: 13.5 % (ref 10–15)
GFR SERPL CREATININE-BSD FRML MDRD: 50 ML/MIN/1.7M2
GLUCOSE SERPL-MCNC: 73 MG/DL (ref 70–99)
HCT VFR BLD AUTO: 26.9 % (ref 35–47)
HGB BLD-MCNC: 9.1 G/DL (ref 11.7–15.7)
LACTATE BLD-SCNC: 0.6 MMOL/L (ref 0.7–2.1)
Lab: ABNORMAL
MCH RBC QN AUTO: 35 PG (ref 26.5–33)
MCHC RBC AUTO-ENTMCNC: 33.8 G/DL (ref 31.5–36.5)
MCV RBC AUTO: 104 FL (ref 78–100)
MICRO REPORT STATUS: ABNORMAL
MICRO REPORT STATUS: ABNORMAL
MICROORGANISM SPEC CULT: ABNORMAL
MICROORGANISM SPEC CULT: ABNORMAL
PLATELET # BLD AUTO: 190 10E9/L (ref 150–450)
POTASSIUM SERPL-SCNC: 3.6 MMOL/L (ref 3.4–5.3)
RBC # BLD AUTO: 2.6 10E12/L (ref 3.8–5.2)
SODIUM SERPL-SCNC: 143 MMOL/L (ref 133–144)
SPECIMEN SOURCE: ABNORMAL
SPECIMEN SOURCE: ABNORMAL
WBC # BLD AUTO: 10.4 10E9/L (ref 4–11)

## 2017-03-22 PROCEDURE — 27210995 ZZH RX 272: Performed by: PHYSICIAN ASSISTANT

## 2017-03-22 PROCEDURE — A9562 TC99M MERTIATIDE: HCPCS | Performed by: INTERNAL MEDICINE

## 2017-03-22 PROCEDURE — 85027 COMPLETE CBC AUTOMATED: CPT | Performed by: PHYSICIAN ASSISTANT

## 2017-03-22 PROCEDURE — 83605 ASSAY OF LACTIC ACID: CPT | Performed by: INTERNAL MEDICINE

## 2017-03-22 PROCEDURE — 36415 COLL VENOUS BLD VENIPUNCTURE: CPT | Performed by: PHYSICIAN ASSISTANT

## 2017-03-22 PROCEDURE — 25000131 ZZH RX MED GY IP 250 OP 636 PS 637: Performed by: INTERNAL MEDICINE

## 2017-03-22 PROCEDURE — 99233 SBSQ HOSP IP/OBS HIGH 50: CPT | Performed by: INTERNAL MEDICINE

## 2017-03-22 PROCEDURE — 99207 ZZC APP CREDIT; MD BILLING SHARED VISIT: CPT | Performed by: PHYSICIAN ASSISTANT

## 2017-03-22 PROCEDURE — 25000132 ZZH RX MED GY IP 250 OP 250 PS 637: Performed by: PHYSICIAN ASSISTANT

## 2017-03-22 PROCEDURE — 25000125 ZZHC RX 250: Performed by: INTERNAL MEDICINE

## 2017-03-22 PROCEDURE — 25000128 H RX IP 250 OP 636: Performed by: PHYSICIAN ASSISTANT

## 2017-03-22 PROCEDURE — 80048 BASIC METABOLIC PNL TOTAL CA: CPT | Performed by: PHYSICIAN ASSISTANT

## 2017-03-22 PROCEDURE — 78707 K FLOW/FUNCT IMAGE W/O DRUG: CPT

## 2017-03-22 PROCEDURE — 34300033 ZZH RX 343: Performed by: INTERNAL MEDICINE

## 2017-03-22 PROCEDURE — 36415 COLL VENOUS BLD VENIPUNCTURE: CPT | Performed by: INTERNAL MEDICINE

## 2017-03-22 PROCEDURE — 25000128 H RX IP 250 OP 636: Performed by: INTERNAL MEDICINE

## 2017-03-22 PROCEDURE — 12000001 ZZH R&B MED SURG/OB UMMC

## 2017-03-22 PROCEDURE — 25000132 ZZH RX MED GY IP 250 OP 250 PS 637: Performed by: INTERNAL MEDICINE

## 2017-03-22 RX ORDER — LIDOCAINE 50 MG/G
1 PATCH TOPICAL
Status: DISCONTINUED | OUTPATIENT
Start: 2017-03-22 | End: 2017-03-23 | Stop reason: HOSPADM

## 2017-03-22 RX ORDER — FUROSEMIDE 10 MG/ML
20 INJECTION INTRAMUSCULAR; INTRAVENOUS ONCE
Status: DISCONTINUED | OUTPATIENT
Start: 2017-03-22 | End: 2017-03-23 | Stop reason: HOSPADM

## 2017-03-22 RX ORDER — LEVOFLOXACIN 750 MG/1
750 TABLET, FILM COATED ORAL
Status: DISCONTINUED | OUTPATIENT
Start: 2017-03-23 | End: 2017-03-23

## 2017-03-22 RX ADMIN — SULFAMETHOXAZOLE AND TRIMETHOPRIM 1 TABLET: 400; 80 TABLET ORAL at 08:27

## 2017-03-22 RX ADMIN — PIPERACILLIN SODIUM,TAZOBACTAM SODIUM 2.25 G: 2; .25 INJECTION, POWDER, FOR SOLUTION INTRAVENOUS at 18:01

## 2017-03-22 RX ADMIN — AZATHIOPRINE 100 MG: 50 TABLET ORAL at 08:27

## 2017-03-22 RX ADMIN — LIDOCAINE 1 PATCH: 50 PATCH TOPICAL at 21:19

## 2017-03-22 RX ADMIN — PIPERACILLIN SODIUM,TAZOBACTAM SODIUM 2.25 G: 2; .25 INJECTION, POWDER, FOR SOLUTION INTRAVENOUS at 12:08

## 2017-03-22 RX ADMIN — TACROLIMUS 0.5 MG: 0.5 CAPSULE, GELATIN COATED ORAL at 08:24

## 2017-03-22 RX ADMIN — SODIUM BICARBONATE: 84 INJECTION, SOLUTION INTRAVENOUS at 14:35

## 2017-03-22 RX ADMIN — PIPERACILLIN SODIUM,TAZOBACTAM SODIUM 2.25 G: 2; .25 INJECTION, POWDER, FOR SOLUTION INTRAVENOUS at 06:30

## 2017-03-22 RX ADMIN — SODIUM CHLORIDE: 9 INJECTION, SOLUTION INTRAVENOUS at 02:42

## 2017-03-22 RX ADMIN — PIPERACILLIN SODIUM,TAZOBACTAM SODIUM 2.25 G: 2; .25 INJECTION, POWDER, FOR SOLUTION INTRAVENOUS at 00:40

## 2017-03-22 RX ADMIN — Medication 10 MCI.: at 10:54

## 2017-03-22 RX ADMIN — SODIUM CHLORIDE: 9 INJECTION, SOLUTION INTRAVENOUS at 12:07

## 2017-03-22 RX ADMIN — TACROLIMUS 0.5 MG: 0.5 CAPSULE, GELATIN COATED ORAL at 20:20

## 2017-03-22 RX ADMIN — PREDNISONE 5 MG: 5 TABLET ORAL at 08:24

## 2017-03-22 NOTE — PROGRESS NOTES
Care Coordinator- Discharge Planning     Admission Date/Time:  3/20/2017  Attending MD:  Alvarez Valencia, *     Data  Date of initial CC assessment:  3/22/17  Chart reviewed, discussed with interdisciplinary team.   Patient was admitted for:   1. Acute pyelonephritis    2. Kidney replaced by transplant    3. Immunosuppressed status (H)    4. Hyperparathyroidism (H)    5. Immunosuppression (H)         Assessment    Spoke with patient at her bedside.  She will have someone to pick her up upon discharging.  Has never had IV antibiotics in passed.  Patient anticipating discharge on oral antibiotics.  No needs at this time.    Plan  Anticipated Discharge Date:  1-2 days  Anticipated Discharge Plan:  home    CTS Handoff completed:  NO    Laquita Harrell RN, BSN  Care Coordinator [facility]  Pager: [phone]  Phone: [phone]

## 2017-03-22 NOTE — PROGRESS NOTES
[facility] - Internal Medicine Daily Note   Date of Service: 3/22/2017  Patient: Nena Underwood  MRN: [id number]  Admission Date: 3/20/2017  Hospital Day # 1    Assessment & Plan: Nena Underwood is a 42 year old female with h/o renal transplant 2010, h/o treated latent TB and recent admission for transplanted kidney pyelo (mostly sensitive klebsiella) admitted 2/4-2/6/17 who was admitted with c/o fever, pain over transplanted kidney similar to last admit. She has had UTI x4 in past 2 months. Treated with cipro last 3 times.     Sepsis 2/2 pyelonephritis or transplanted kidney with recurrent UTI. Presented with fever, pain over transplanted kidney. Started on ceftriaxone in the ED 3/21. CT AT 3/21 new mild peritransplant soft tissue stranding with persistent mild hydronephrosis but increased mild-to-moderate hydroureter. Findings may be suggestive of transplant infection. Mild irregularity and thickening of the urinary bladder wall. No transplant or ureteral stone. WBC normal. UA with nitrites, moderate LE, moderate blood, 10-25 WBC. UC with klebsiella (s. to most antibiotics). BCx NGTD. Transitioned to Zosyn 3/21 given ongoing fevers. Per urology, lasix renogram 3/22 without evidence of obstruction, no indication for PNT placement. Hypotensive overnight, but now VSS and afebrile.   - Transplant nephrology and urology consulted  - Continue Zosyn over night, will start Levaquin in the am and taper off Zosyn. Will need 21 day course  - If patient spikes, would order 250 mg solumedrol per tx nephrology   - d/w Tx nephrology, no need to to ultrasound with PVR given that lasix renogram done  - Follow cultures   - Follow up with urology OP     S/p renal transplant with SAHIL: Improving. Cr 1.2 on admission, increased to 1.32 3/21. Now improving, at 1.18. Likely 2/2 UTI  - Continue NS overnight   - Trend in am  - Continue azathioprine/tacrolimus  - Tacro trough in the am  - Consider transplant doppler if not  improving    NAGMA: Improving. CO2 18 (17).    - Start sodium bicarb per nephrology      Consulting Services: Transplant nephrology, urology     CODE: Full  DVT: Mechanical   Diet/fluids: Regular diet and IVF with NS  Disposition: Pending ongoing improvement and workup     Case discussed with attending physician, Dr. Jose Elias Crooks  Internal Medicine SHELLY Hospitalist   [facility]   Pager: [phone]    Team: Medicine [facility]  Page Cross Cover after 5 pm: pager [phone]   ___________________________________________________________________    Subjective & Interval Hx:  Pain completely resolved today. Feels very well and wants to go home. Aware that she is still on IV antibiotics and will need to continue on them for the next day or so. Agreeable to plan. Denies fever or chills. Appetite is stable. Was supposed to have guevara placed per urology, but very resistant to the idea based on prior experiences. Denies dyspnea or chest pain.     Last 24 hr care team notes reviewed.   ROS:  4 point ROS including Respiratory, CV, GI and , other than that noted in the HPI, is negative.    Medications: Reviewed in EPIC. List below for reference    Physical Exam:    /72 (BP Location: Right arm)  Pulse 112  Temp 99.2  F (37.3  C) (Oral)  Resp 18  Ht 1.524 m (5')  Wt 41.7 kg (92 lb)  LMP 03/20/2017 (Exact Date)  SpO2 99%  Breastfeeding? No  BMI 17.97 kg/m2     GENERAL: Alert and oriented x3. NAD. Pleasant.   HEENT: Anicteric sclera. Mucous membranes moist.   CV: RRR. S1, S2. No murmurs appreciated.   RESPIRATORY: Effort normal on room air. Lungs CTAB with no wheezing, rales, rhonchi.   GI: Abdomen soft and non distended with normoactive bowel sounds present in all quadrants. No tenderness, rebound, or guarding.   NEUROLOGICAL: No focal deficits. Moves all extremities.    EXTREMITIES: No peripheral edema. Intact bilateral pedal pulses.   SKIN: No jaundice. No rashes.     Labs &  Studies of Note: I personally reviewed pertinent labs and studies.

## 2017-03-22 NOTE — PLAN OF CARE
Problem: Goal Outcome Summary  Goal: Goal Outcome Summary  Outcome: Improving  AOx4. Independent in room. Refused to have guevara placed this am but later per MD, does not need guevara insertion. Voiding good amounts. Voided 350ml this evening with PVR of 107. Reports some burning on urination. Reports right sided abd pain that radiates to back but declines any pain meds. Heating pad set up. Had renogram this am. Eating well. Afebrile. IV antibiotics. Sodium bicarb infusion. Wants to go home today, possible d/c tomorrow.

## 2017-03-22 NOTE — CONSULTS
REQUESTING PHYSICIAN:  ANISH Valencia MD.      REASON FOR CONSULTATION:  I was kindly asked by Dr. Valencia to evaluate Ms Underwood for pyelonephritis in the setting of kidney transplant and chronic immunosuppression.      HISTORY OF PRESENT ILLNESS:  Ms. Nena Underwood is a 42-year-old lady with end-stage kidney disease of unknown etiology, possibly congenital.  She received a living donor kidney transplantation in 06/2010.  Her post-transplant course was complicated by recurrent urinary tract infection or pyelonephritis.  She presented to the emergency room with complaint of fevers, chills, right lower quadrant abdominal pain which is similar to her previous presentation with pyelonephritis.  She had a CT scan done that showed soft tissue stranding around the kidney and somewhat distended ureter and her urinary bladder was full around that time.  She felt better after hydration and initiation of antibiotics.  Her T-max was 104.  She has been having nausea and vomiting currently, no chest pain or shortness of breath.  No rashes.  She is compliant with her medications.  She is maintained on triple immunosuppression with Imuran, Prograf and prednisone.      REVIEW OF SYSTEMS:  Comprehensive review of systems was negative, except as noted in the HPI.      PAST MEDICAL HISTORY:  Significant for living donor kidney transplant from 2010 with a baseline creatinine of 0.9 mg to 1 mg per deciliter, maintained on triple immunosuppression, prednisone, Prograf and Imuran.      PAST SURGICAL HISTORY:  Significant for kidney transplant.      SOCIAL HISTORY:  Negative for smoking, drinking or drugs.  She is  and takes her medications regularly.      FAMILY HISTORY:  Significant for a TB in a grandparent and eye problems.      HOME MEDICATIONS:  List reviewed and includes;   1.  Prednisone 5 mg daily.   2.  Prograf 0.5 mg twice a day.     3.  Imuran 100 mg daily.   4.  Bactrim single strength daily.    5.  Acetaminophen.   6.   Pyridium.        PHYSICAL EXAMINATION:     VITAL SIGNS:  Reviewed.  T-max was 102.5 well after midnight on the night of the admission, T current was 99.7, heart rate 81, blood pressure 93/58, satting 99%.   GENERAL:  Pleasant, in no acute distress.   HEENT:  Normocephalic, atraumatic.   NECK:  Supple, moist mucous membranes.   CHEST:  Clear to auscultation bilaterally.   HEART:  Regular.  No gallops or rubs.   ABDOMEN:  Soft, benign, with noted tenderness over the right lower quadrant.   EXTREMITIES:  Without edema.   NEUROLOGIC:  Grossly intact.   PSYCHIATRIC:  Appropriate mood.   SKIN:  Without obvious rashes.      CURRENT MEDICATIONS:  List reviewed and includes;    1.  Normal saline running at 125 mL an hour.  She received 2 boluses of saline.     2.  Imuran 100 mg.    3.  Acetaminophen.     4.  Ceftriaxone 1 gram.     5.  Zosyn.     6.  Bactrim.     7.  Tacrolimus.      LABORATORY DATA:  Reviewed:  Sodium 141, potassium 3.6, chloride 114, bicarbonate 17, BUN 20, creatinine 1.3, calcium 7.6.  Lactate 0.8.  White count 12.5, hemoglobin 9.7, platelet 200.  UA with greater than 182 white cells and 12 red cells.  Urine culture had already grown Klebsiella pneumoniae similar bug to her 03/02 sample.  No donor specific antibodies detected according to her DSH from 2012.  Most recent Prograf was from 12/2016, reading 8.4 ng/mL.  CT scan of the abdomen and pelvis was reviewed as noted above.      ASSESSMENT AND PLAN:   1.  Transplant allograft pyelonephritis with Klebsiella pneumoniae, sensitive to ceftriaxone.   2.  Acute kidney allograft dysfunction, most likely secondary to the pyelo, however, CT scan suggestive of hydroureter.  I would like to follow up on that tomorrow with ultrasound of the transplant kidney with post-void residual.   3.  Immunosuppression management:  Will continue her home regimen and check drug levels in the a.m.  If creatinine continues to rise, we will consider conditioning with a pulse of  steroid 250 mg for 1 day followed by 125 mg, followed by 60 mg at daily intervals, followed by a brief quick taper.  This will help with stress dose steroids and to limit the chance of inflammation in the kidney in the setting of pyelonephritis.   4.  Systemic inflammatory response syndrome in the setting of pyelonephritis:  I would encourage IV hydration bolus with normal saline as needed.   5.  Mild acidosis:  Will consider maintenance of 1/2 NS with 75 mEq of sodium bicarbonate at 125-150 mL per hour to help correct the acidosis.  Repeat chemistries in the a.m., will determine the need to switch fluids.  If her CO2 level is less than 18, would go ahead and start half-strength isotonic bicarbonate solution as mentioned above.   6.  Anemia, likely in the setting of acute illness and background of chronic kidney dysfunction and immunosuppression:  We will monitor.      DISCUSSION:  Overall, patient appears to be in acute kidney injury in the setting of acute allograft pyelonephritis.  Whether an element of obstruction is present is unclear.  I would like to follow up with ultrasounds with postvoid residual.  If she is still having signs of hydro on the ultrasound, I would follow that with nuclear renal scan with Lasix, i.e. Lasix renogram to assess for an obstructive process.  If present would like Urology and Transplant Surgery input.  Based on her creatinine tomorrow, if still elevated, will consider a brief course of steroids as mentioned above.  For now will continue immunosuppression and adequate hydration and acidosis correction.      Thank you for the consultation.  The patient was seen and examined during the morning round on 2017.         RUDDY MCCORMACK MD             D: 2017 22:18   T: 2017 23:54   MT:       Name:     KEANU SANDERS   MRN:      [id number]        Account:       [id number]   :      1975           Consult Date:  2017      Document: [id number]       cc:  Alvarez Valencia MD

## 2017-03-22 NOTE — PROGRESS NOTES
Transplant Nephrology Progress Note  03/22/2017         Assessment & Recommendations:   Nena Underwood is a 42 year old year old female    1. Allograft pyelonephritis improving and responding to abx   2. SAHIL of the allograft   3. Immunosuppression management   4. Hydronephrosis of the allograft with negative renogram   Overall doing better, continue abx, ok to switch to oral. Would extend the course for 21 days.   Consider prophylaxis with methenamine renally dosed as long as creatinine is at or under 1 mg/dL    Recommendations were communicated to primary team via this note     Joss Rubin MD     Interval History :   In the last 24 hours Nena Underwood she has done well   Review of Systems:   (4 pt ROS reviewed alone is not adequate unless you detail systems you reviewed)  I reviewed the following systems:  GI: good appetite. no nausea or vomiting or diarrhea.   Neuro:  no confusion  Constitutional:  no fever or chills  CV: no dyspnea or edema.  no chest pain.    Physical Exam:   I/O last 3 completed shifts:  In: 2387.08 [P.O.:510; I.V.:1877.08]  Out: 1325 [Urine:1325]   /67 (BP Location: Right arm)  Pulse 67  Temp 98.6  F (37  C) (Oral)  Resp 16  Ht 1.524 m (5')  Wt 41.7 kg (92 lb)  LMP 03/20/2017 (Exact Date)  SpO2 99%  Breastfeeding? No  BMI 17.97 kg/m2     GENERAL APPEARANCE: alert and no distress  EYES:  no scleral icterus, pupils equal  HENT: mouth without ulcers or lesions  PULM: lungs clear to auscultation, equal air movement, no clubbing  CV: regular rhythm, normal rate, no rub     -JVP wnl     -edema none   GI: soft, nontender, nondistended, bowel sounds are wnl  INTEGUMENT: no cyanosis, no rash  NEURO:  no asterixis     Labs:   All labs reviewed by me  Electrolytes/Renal -   Recent Labs   Lab Test  03/22/17   0933  03/21/17   1820  03/20/17   2136   03/07/15   0551  03/06/15   0538  03/05/15   2328   NA  143  141  140   < >  140  141  139   POTASSIUM  3.6  3.6  3.8   < >  4.8  4.0   4.2   CHLORIDE  116*  114*  108   < >  109  115*  111*   CO2  18*  17*  22   < >  22  19*  22   BUN  16  20  22   < >  12  14  18   CR  1.18*  1.32*  1.22*   < >  0.90  0.78  1.03   GLC  73  88  85   < >  89  87  130*   KARINA  7.6*  7.6*  8.8   < >  8.9  8.0*  8.7   MAG   --    --    --    --   1.6  2.7*  1.4*   PHOS   --    --    --    --   2.7  1.9*  2.7    < > = values in this interval not displayed.       CBC -   Recent Labs   Lab Test  03/22/17   0933  03/21/17   1820  03/20/17 2136   WBC  10.4  12.5*  6.5   HGB  9.1*  9.7*  11.0*   PLT  190  200  233       LFTs -   Recent Labs   Lab Test  03/20/17   2136  02/04/17   1144  03/21/16 2010   ALKPHOS  81  98  99   BILITOTAL  0.9  0.5  0.5   ALT  36  33  35   AST  38  38  37   PROTTOTAL  7.4  7.8  8.0   ALBUMIN  3.4  3.2*  3.5       Iron Panel - No lab results found.      Imaging:  All imaging studies reviewed by me.     Current Medications:    furosemide  20 mg Intravenous Once     [START ON 3/23/2017] levofloxacin  750 mg Oral Q48H     lidocaine  1 patch Transdermal Q24H     [START ON 3/23/2017] lidocaine   Transdermal Q24h     lidocaine   Transdermal Q8H     senna-docusate  1-2 tablet Oral BID     azaTHIOprine  100 mg Oral Daily     predniSONE  5 mg Oral Daily     tacrolimus  0.5 mg Oral BID     sulfamethoxazole-trimethoprim  1 tablet Oral Daily     piperacillin-tazobactam  2.25 g Intravenous Q6H       sodium bicabonate in water for infusion 125 mL/hr at 03/22/17 1276     Joss Rubin MD

## 2017-03-22 NOTE — PLAN OF CARE
Problem: Goal Outcome Summary  Goal: Goal Outcome Summary  Outcome: No Change  Patient is alert and oriented x4.  Had a low Blood pressure, tachycardic, afebrile. Septic protocol was initiated. Lactic level is 0.6. VSS within her baseline. Denies abdominal pain, nausea and vomiting. Continue IV NS0.9% running at 125 ml/hr.  Also gets Piperacillin x2. Ambulates independently in her room. Skin is dry, intact and clean. Voiding adequately. Will continue to monitor and make sure her needs are met on time.

## 2017-03-22 NOTE — PROGRESS NOTES
UROLOGY BRIEF NOTE    Reviewed results of patient's renogram from today. No evidence of obstruction, very prompt drainage of transplant kidney.      - Unclear etiology or recurrent UTIs, however there is no surgically treatable nidus visible on imaging.  No indication for stent or PNT placement given lack of obstruction  - Patient does not need guevara placed if she is emptying bladder well (please document 1-2 PVRs with bladder scan)  - Urology will sign off at this time. Please call with any questions or concerns.    Rafa Almazan MD  Urology Resident

## 2017-03-23 VITALS
DIASTOLIC BLOOD PRESSURE: 73 MMHG | SYSTOLIC BLOOD PRESSURE: 108 MMHG | WEIGHT: 92 LBS | TEMPERATURE: 97.7 F | OXYGEN SATURATION: 100 % | HEART RATE: 65 BPM | HEIGHT: 60 IN | BODY MASS INDEX: 18.06 KG/M2 | RESPIRATION RATE: 16 BRPM

## 2017-03-23 LAB
ANION GAP SERPL CALCULATED.3IONS-SCNC: 7 MMOL/L (ref 3–14)
BUN SERPL-MCNC: 17 MG/DL (ref 7–30)
C DIFF TOX B STL QL: NORMAL
CALCIUM SERPL-MCNC: 7.9 MG/DL (ref 8.5–10.1)
CHLORIDE SERPL-SCNC: 111 MMOL/L (ref 94–109)
CO2 SERPL-SCNC: 24 MMOL/L (ref 20–32)
CREAT SERPL-MCNC: 1.16 MG/DL (ref 0.52–1.04)
ERYTHROCYTE [DISTWIDTH] IN BLOOD BY AUTOMATED COUNT: 13.4 % (ref 10–15)
GFR SERPL CREATININE-BSD FRML MDRD: 51 ML/MIN/1.7M2
GLUCOSE SERPL-MCNC: 74 MG/DL (ref 70–99)
HCT VFR BLD AUTO: 24.9 % (ref 35–47)
HGB BLD-MCNC: 8.6 G/DL (ref 11.7–15.7)
MCH RBC QN AUTO: 35.5 PG (ref 26.5–33)
MCHC RBC AUTO-ENTMCNC: 34.5 G/DL (ref 31.5–36.5)
MCV RBC AUTO: 103 FL (ref 78–100)
PLATELET # BLD AUTO: 148 10E9/L (ref 150–450)
POTASSIUM SERPL-SCNC: 3.3 MMOL/L (ref 3.4–5.3)
RBC # BLD AUTO: 2.42 10E12/L (ref 3.8–5.2)
SODIUM SERPL-SCNC: 143 MMOL/L (ref 133–144)
SPECIMEN SOURCE: NORMAL
TACROLIMUS BLD-MCNC: 6.9 UG/L (ref 5–15)
TME LAST DOSE: NORMAL H
WBC # BLD AUTO: 5.4 10E9/L (ref 4–11)

## 2017-03-23 PROCEDURE — 87493 C DIFF AMPLIFIED PROBE: CPT | Performed by: PHYSICIAN ASSISTANT

## 2017-03-23 PROCEDURE — 25000132 ZZH RX MED GY IP 250 OP 250 PS 637: Performed by: PHYSICIAN ASSISTANT

## 2017-03-23 PROCEDURE — 25000132 ZZH RX MED GY IP 250 OP 250 PS 637: Performed by: INTERNAL MEDICINE

## 2017-03-23 PROCEDURE — 25000131 ZZH RX MED GY IP 250 OP 636 PS 637: Performed by: INTERNAL MEDICINE

## 2017-03-23 PROCEDURE — 80048 BASIC METABOLIC PNL TOTAL CA: CPT | Performed by: PHYSICIAN ASSISTANT

## 2017-03-23 PROCEDURE — 99239 HOSP IP/OBS DSCHRG MGMT >30: CPT | Performed by: PHYSICIAN ASSISTANT

## 2017-03-23 PROCEDURE — 25000125 ZZHC RX 250: Performed by: INTERNAL MEDICINE

## 2017-03-23 PROCEDURE — 25000128 H RX IP 250 OP 636: Performed by: PHYSICIAN ASSISTANT

## 2017-03-23 PROCEDURE — 27210995 ZZH RX 272: Performed by: PHYSICIAN ASSISTANT

## 2017-03-23 PROCEDURE — 36415 COLL VENOUS BLD VENIPUNCTURE: CPT | Performed by: PHYSICIAN ASSISTANT

## 2017-03-23 PROCEDURE — 80197 ASSAY OF TACROLIMUS: CPT | Performed by: PHYSICIAN ASSISTANT

## 2017-03-23 PROCEDURE — 85027 COMPLETE CBC AUTOMATED: CPT | Performed by: PHYSICIAN ASSISTANT

## 2017-03-23 RX ORDER — POTASSIUM CHLORIDE 750 MG/1
20 TABLET, EXTENDED RELEASE ORAL ONCE
Status: COMPLETED | OUTPATIENT
Start: 2017-03-23 | End: 2017-03-23

## 2017-03-23 RX ORDER — LEVOFLOXACIN 500 MG/1
500 TABLET, FILM COATED ORAL DAILY
Status: DISCONTINUED | OUTPATIENT
Start: 2017-03-24 | End: 2017-03-23 | Stop reason: HOSPADM

## 2017-03-23 RX ORDER — LEVOFLOXACIN 500 MG/1
500 TABLET, FILM COATED ORAL DAILY
Qty: 18 TABLET | Refills: 0 | Status: SHIPPED | OUTPATIENT
Start: 2017-03-24 | End: 2017-04-11

## 2017-03-23 RX ADMIN — LEVOFLOXACIN 750 MG: 750 TABLET, FILM COATED ORAL at 00:34

## 2017-03-23 RX ADMIN — POTASSIUM CHLORIDE 20 MEQ: 750 TABLET, EXTENDED RELEASE ORAL at 12:02

## 2017-03-23 RX ADMIN — TACROLIMUS 0.5 MG: 0.5 CAPSULE, GELATIN COATED ORAL at 08:36

## 2017-03-23 RX ADMIN — PREDNISONE 5 MG: 5 TABLET ORAL at 08:36

## 2017-03-23 RX ADMIN — Medication: at 11:02

## 2017-03-23 RX ADMIN — PIPERACILLIN SODIUM,TAZOBACTAM SODIUM 2.25 G: 2; .25 INJECTION, POWDER, FOR SOLUTION INTRAVENOUS at 06:02

## 2017-03-23 RX ADMIN — AZATHIOPRINE 100 MG: 50 TABLET ORAL at 08:36

## 2017-03-23 RX ADMIN — ACETAMINOPHEN 650 MG: 325 TABLET, FILM COATED ORAL at 01:46

## 2017-03-23 RX ADMIN — SULFAMETHOXAZOLE AND TRIMETHOPRIM 1 TABLET: 400; 80 TABLET ORAL at 08:36

## 2017-03-23 RX ADMIN — PIPERACILLIN SODIUM,TAZOBACTAM SODIUM 2.25 G: 2; .25 INJECTION, POWDER, FOR SOLUTION INTRAVENOUS at 00:38

## 2017-03-23 RX ADMIN — SODIUM BICARBONATE: 84 INJECTION, SOLUTION INTRAVENOUS at 00:35

## 2017-03-23 ASSESSMENT — PAIN DESCRIPTION - DESCRIPTORS: DESCRIPTORS: ACHING;SORE

## 2017-03-23 NOTE — PLAN OF CARE
Problem: Goal Outcome Summary  Goal: Goal Outcome Summary  Outcome: Adequate for Discharge Date Met:  03/23/17  Patients urinary symptoms have resolved. Patient has had 4-5 loose stools over the past day. MD was notified. Stool sample was done. Awaiting results. Patient also complained of neck pain but refused pain medications. She did however use Bengay with relief.      Patient is adequate for discharge. D/C paperwork given to patient. All questions answered. Patient was offered a wheelchair and declined. She walked to the discharge pharmacy.

## 2017-03-23 NOTE — PLAN OF CARE
Problem: Goal Outcome Summary  Goal: Goal Outcome Summary  Outcome: No Change  /75 (BP Location: Right arm)  Pulse 67  Temp 97.1  F (36.2  C) (Oral)  Resp 16  Ht 1.524 m (5')  Wt 41.7 kg (92 lb)  LMP 03/20/2017 (Exact Date)  SpO2 99%  Breastfeeding? No  BMI 17.97 kg/m2  8300-5426:  A&Ox4. Able to make needs known. Afebrile. VSS on room air. Reports neck stiffness and discomfort; provider notified and lidocaine patch placed after pt did not obtain relief from hot packs. Pt does not like to take PO pain medication. Also has some R sided abdominal pain which radiates to the back which again she does not take pain meds for. Pt independently repositions self in bed and ambulates to bathroom. Reports some burning on urination. Sodium Bicarb infusing in R hand PIV at 125 ml/hr. Pt receives IV abx for R pyelonephritis. Regular diet; fair appetite. Ate 50% of evening meal. Continue to monitor and follow POC.

## 2017-03-23 NOTE — PLAN OF CARE
Problem: Goal Outcome Summary  Goal: Goal Outcome Summary  Outcome: No Change  /82  Pulse 64  Temp 97.6  F (36.4  C) (Oral)  Resp 16  Ht 1.524 m (5')  Wt 41.7 kg (92 lb)  LMP 03/20/2017 (Exact Date)  SpO2 98%  Breastfeeding? No  BMI 17.97 kg/m2     A&Ox4. VSS on room air. Up ad rimma. Pt c/o aching neck pain that was radiating to her head. She removed the lidocaine patch herself stating that it did not help. Pt was crying because of the pain and discomfort. Writer offered pain medication but she was hesitant to take any. Writer gave her a neck and back massage with Ache-Ease aromatherapy that appeared to help. Pt then agreed to take Tylenol with some relief. Voiding adequately. PVR-25 mL. 2 soft BM's overnight per pt. PIV infusing sodium bicarb. Zosyn infused per MAR. Tolerating regular diet. Pt had an episode of n/v. Denied antiemetic. Peppermint oil and cold compress given with relief.Possible discharge today or tomorrow. Will continue to monitor and follow POC.

## 2017-03-23 NOTE — DISCHARGE SUMMARY
[facility]  Discharge Summary    Nena Underwood MRN# [id number]   YOB: 1975 Age: 42 year old     Date of Admission:  3/20/2017  Date of Discharge:  3/23/2017 12:40 PM  Admitting Physician:  Alvarez Valencia MD  Discharge Physician:  Dr. Moctezuma/Maryam Crooks PA-C (Contact: [phone])  Discharging Service:  Internal Medicine     Primary Provider: Jorge L Tucker          Brief History of Illness:   Nena Underwood is a 42 year old female with h/o renal transplant 2010, h/o treated latent TB and recent admission for transplanted kidney pyelo (mostly sensitive klebsiella) admitted 2/4-2/6/17 who was admitted with c/o fever, pain over transplanted kidney similar to last admit. She has had UTI x4 in past 2 months. Treated with cipro last 3 times.    Treated with IV antibiotics, fevers normalized and symptoms improved. Transitioned to oral antibiotics. See below         Primary care provider to do:   Follow up within 1 week. Follow up on urology and transplant nephrology recs.           Discharge Diagnosis:   Sepsis 2/2 pyelonephritis or transplanted kidney with recurrent UTI  S/p renal transplant with ASHIL  NAGMA             Discharge Disposition:   Discharged to home           Condition on Discharge:   Discharge condition: Stable   Code status on discharge: Full Code           Procedures:   CT AP 3/21  New mild peritransplant soft tissue stranding with persistent mild hydronephrosis but increased mild-to-moderate hydroureter. Findings may be suggestive of transplant infection. Mild irregularity and thickening of the urinary bladder wall. Findings can be suggestive of cystitis. No transplant or ureteral stone.    NM Renal Venogram with Lasix 3/22  Mild dilatation of the collecting system with no evidence of obstruction. Prompt washout of the radiotracer from the kidney over time without Lasix administration.          Discharge Medications:     Discharge Medication List as of  3/23/2017 11:49 AM      START taking these medications    Details   levofloxacin (LEVAQUIN) 500 MG tablet Take 1 tablet (500 mg) by mouth daily for 18 days, Disp-18 tablet, R-0, E-Prescribe         CONTINUE these medications which have NOT CHANGED    Details   predniSONE (DELTASONE) 5 MG tablet TAKE ONE TABLET BY MOUTH EVERY DAY, Disp-90 tablet, R-3, Fax      PROGRAF 0.5 MG PO CAPSULE Take 1 capsule (0.5 mg) by mouth 2 times daily, Disp-60 capsule, R-11, RANJANA, E-Prescribe      azaTHIOprine (IMURAN) 50 MG tablet Take 2 tablets (100 mg) by mouth daily, Disp-180 tablet, R-3, E-Prescribe      sulfamethoxazole-trimethoprim (BACTRIM,SEPTRA) 400-80 MG per tablet Take 1 tablet by mouth daily, Disp-90 tablet, R-3, E-Prescribe      Acetaminophen (TYLENOL PO) Take 500 mg by mouth every 4 hours as needed for mild pain or fever, Historical      phenazopyridine (PYRIDIUM) 100 MG tablet Take 1 tablet (100 mg) by mouth 3 times daily as needed for urinary tract discomfort, Disp-12 tablet, R-0, Fax                   Consultations:   Consultation during this admission received from transplant nephrology and urology.              Hospital Course:   Nena Underwood is a 42 year old female with h/o renal transplant 2010, h/o treated latent TB and recent admission for transplanted kidney pyelo (mostly sensitive klebsiella) admitted 2/4-2/6/17 who was admitted with c/o fever, pain over transplanted kidney similar to last admit. She has had UTI x4 in past 2 months. Treated with cipro last 3 times.      Sepsis 2/2 pyelonephritis or transplanted kidney with recurrent UTI. Presented with fever, pain over transplanted kidney. Started on ceftriaxone in the ED 3/21. CT AT 3/21 new mild peritransplant soft tissue stranding with persistent mild hydronephrosis but increased mild-to-moderate hydroureter. Findings may be suggestive of transplant infection. Mild irregularity and thickening of the urinary bladder wall. No transplant or ureteral stone.  WBC normal. UA with nitrites, moderate LE, moderate blood, 10-25 WBC. UC with klebsiella (s. to most antibiotics). BCx NGTD. Transitioned to Zosyn 3/21 given ongoing fevers. Per urology, lasix renogram 3/22 without evidence of obstruction, no indication for PNT placement. Hypotensive overnight, but now VSS and afebrile.   - Patient should follow up with PCP within 1 week, repeat CBC, BMP  - Transplant nephrology and urology consulted  - Started oral Levaquin 3/22, stopped Zosyn prior to discharge 3/23  - will continue Levaquin for a 21 day course. Will need to see transplant nephrology for ongoing care  - Patient should take oral probiotic while on antibiotics, she plans to eat yogurt with live and active cultures       S/p renal transplant with SAHIL: Improving. Cr 1.2 on admission, increased to 1.32 3/21. Improved to 1.16 prior to discharge. Likely 2/2 to UTI.   - Follow up with PCP as above  - Consider transplant doppler patient re-develops SAHIL    NAGMA: Resolved prior to discharge with sodium bicarb.      Neck Soreness: MSK in nature, improved with back rub  - Icyhot            Final Day of Progress before Discharge:       Physical Exam:  Blood pressure 108/73, pulse 65, temperature 97.7  F (36.5  C), temperature source Oral, resp. rate 16, height 1.524 m (5'), weight 41.7 kg (92 lb), last menstrual period 03/20/2017, SpO2 100 %, not currently breastfeeding.  GENERAL: Alert and oriented x 3. NAD. Pleasant  HEENT: Anicteric sclera. PERRL. Mucous membranes moist and without lesions.   CV: RRR. S1, S2. No murmurs appreciated.   RESPIRATORY: Effort normal. Lungs CTAB with no wheezing, rales, rhonchi.   GI: Abdomen soft and non distended with normoactive bowel sounds present in all quadrants. No tenderness, rebound, guarding.   MUSCULOSKELETAL: No joint swelling or tenderness. Moves all extremities.   NEUROLOGICAL: No focal deficits. Strength 5/5 bilaterally in upper and lower extremities.   EXTREMITIES: No peripheral  edema. Intact bilateral pedal pulses.   SKIN: No jaundice. No rashes.        Data:  All laboratory data reviewed             Significant Results:     Lab Results   Component Value Date    WBC 5.4 03/23/2017    HGB 8.6 (L) 03/23/2017    HCT 24.9 (L) 03/23/2017     (L) 03/23/2017     03/23/2017    POTASSIUM 3.3 (L) 03/23/2017    CHLORIDE 111 (H) 03/23/2017    CO2 24 03/23/2017    BUN 17 03/23/2017    CR 1.16 (H) 03/23/2017    GLC 74 03/23/2017    SED 16 09/21/2011    DD 0.3 02/21/2015    TROPI  02/21/2015     <0.015  The 99th percentile for upper reference range is 0.045 ug/L.  Troponin values in   the range of 0.045 - 0.120 ug/L may be associated with risks of adverse   clinical events.   Effective 7/30/2014, the reference range for this assay has changed to reflect   new instrumentation/methodology.      AST 38 03/20/2017    ALT 36 03/20/2017    ALKPHOS 81 03/20/2017    BILITOTAL 0.9 03/20/2017    INR 1.35 (H) 03/06/2015      Recent Results (from the past 48 hour(s))   NM Renal Scan w Flw & Fnct w/o Phrm Int    Narrative    Examination:  NM RENOGRAM 3/22/2017 11:46 AM     Comparison: CT 3/21/2017    History: chronic hydronephrosis, renal allograft in the right lower  quadrant.    Dose: 10.0 mCi Tc-99m MAG3.    Findings: Evaluation of initial blood flow images demonstrates normal  blood flow to the renal allograft. Prompt excretion of the radiotracer  in the collecting system is noted. Mild dilatation of the collecting  system. Delayed images demonstrate normal washout of the radiotracer  into the bladder.    Maximal radiotracer cortical uptake is noted in the 3.5 minute. No  significant radiotracer residual in the collecting system over time.  Therefore, Lasix was not administered.      Impression    Impression:   1. Mild dilatation of the collecting system with no evidence of  obstruction.  2. Prompt washout of the radiotracer from the kidney over time without  Lasix administration.         I have  "personally reviewed the examination and initial interpretation  and I agree with the findings.    PLACIDO GRUBER MD                Pending Results:   Unresulted Labs Ordered in the Past 30 Days of this Admission     Date and Time Order Name Status Description    3/21/2017 1727 Blood culture Preliminary     3/21/2017 1727 Blood culture Preliminary                   Discharge Instructions and Follow-Up:     Discharge Procedure Orders  Urology Adult Referral     Reason for your hospital stay   Order Comments: You were admitted with pyelonephritis and discharged on an extended course of oral antibiotics when symptoms decreased and you were afebrile for >24 hours     Adult [facility] Follow-up and recommended labs and tests   Order Comments: Follow up with PCP within 1 week for hospital follow up and repeat CBC, BMP.    Follow up with transplant nephrology.    Referral to OP urology made, follow up when possible.     Appointments on [place] and/or [facility] (with [facility] or [facility] provider or service). Call [phone] if you haven't heard regarding these appointments within 7 days of discharge.     Activity   Order Comments: Your activity upon discharge: activity as tolerated   Order Specific Question Answer Comments   Is discharge order? Yes      When to contact your care team   Order Comments: Call or return if you develop increased abdominal pain, during/pain/increased frequency/blood with urination, fever >101, confusion, altered mental status, inability to tolerate orals, worsened diarrhea, or any other symptom of concern     Discharge Instructions   Order Comments: 1. Start taking a probiotic pill or yogurt with \"live and active cultures\" to prevent diarrhea  2. If diarrhea worsens, see your primary provider for stool testing  3. Icy hot to the neck as needed  4. Make sure to practice good genital hygiene including wiping front to back, urinating and cleaning genitals after sexual intercourse, wearing clean " underpants, washing genitals daily with soap and water in the shower, etc.     Full Code     Diet   Order Comments: Follow this diet upon discharge: Orders Placed This Encounter     Combination Diet Regular Diet Adult   Order Specific Question Answer Comments   Is discharge order? Yes         Maryam Crooks  [facility] Hospitalist  [phone]  March 23, 2017    Time spent on patient: 45 minutes total including face to face and coordinating care time reviewing current illness, any medication changes, and the care plan for today.    Discharge plan was discussed with patient, nursing, and attending physician. Jose Elias.

## 2017-03-24 ENCOUNTER — TELEPHONE (OUTPATIENT)
Dept: FAMILY MEDICINE | Facility: CLINIC | Age: 42
End: 2017-03-24

## 2017-03-24 NOTE — TELEPHONE ENCOUNTER
Pt was seen at [facility] on Thu 3/23/2017.  Reason for visit: Acute Pyelonephritis      Thank you!

## 2017-03-27 LAB
BACTERIA SPEC CULT: NO GROWTH
BACTERIA SPEC CULT: NO GROWTH
MICRO REPORT STATUS: NORMAL
MICRO REPORT STATUS: NORMAL
SPECIMEN SOURCE: NORMAL
SPECIMEN SOURCE: NORMAL

## 2017-03-30 ENCOUNTER — OFFICE VISIT (OUTPATIENT)
Dept: FAMILY MEDICINE | Facility: CLINIC | Age: 42
End: 2017-03-30
Payer: COMMERCIAL

## 2017-03-30 VITALS
TEMPERATURE: 98.7 F | SYSTOLIC BLOOD PRESSURE: 92 MMHG | OXYGEN SATURATION: 99 % | WEIGHT: 90 LBS | HEART RATE: 84 BPM | RESPIRATION RATE: 16 BRPM | BODY MASS INDEX: 17.67 KG/M2 | DIASTOLIC BLOOD PRESSURE: 56 MMHG | HEIGHT: 60 IN

## 2017-03-30 DIAGNOSIS — N10 ACUTE PYELONEPHRITIS: Primary | ICD-10-CM

## 2017-03-30 DIAGNOSIS — Z30.09 ENCOUNTER FOR COUNSELING REGARDING CONTRACEPTION: ICD-10-CM

## 2017-03-30 LAB
ALBUMIN UR-MCNC: NEGATIVE MG/DL
APPEARANCE UR: CLEAR
BACTERIA #/AREA URNS HPF: ABNORMAL /HPF
BASOPHILS # BLD AUTO: 0 10E9/L (ref 0–0.2)
BASOPHILS NFR BLD AUTO: 0.3 %
BILIRUB UR QL STRIP: NEGATIVE
COLOR UR AUTO: YELLOW
DIFFERENTIAL METHOD BLD: ABNORMAL
EOSINOPHIL # BLD AUTO: 0 10E9/L (ref 0–0.7)
EOSINOPHIL NFR BLD AUTO: 0.6 %
ERYTHROCYTE [DISTWIDTH] IN BLOOD BY AUTOMATED COUNT: 12.2 % (ref 10–15)
GLUCOSE UR STRIP-MCNC: NEGATIVE MG/DL
HCT VFR BLD AUTO: 33 % (ref 35–47)
HGB BLD-MCNC: 11.2 G/DL (ref 11.7–15.7)
HGB UR QL STRIP: ABNORMAL
KETONES UR STRIP-MCNC: NEGATIVE MG/DL
LEUKOCYTE ESTERASE UR QL STRIP: NEGATIVE
LYMPHOCYTES # BLD AUTO: 0.6 10E9/L (ref 0.8–5.3)
LYMPHOCYTES NFR BLD AUTO: 17.4 %
MCH RBC QN AUTO: 35.3 PG (ref 26.5–33)
MCHC RBC AUTO-ENTMCNC: 33.9 G/DL (ref 31.5–36.5)
MCV RBC AUTO: 104 FL (ref 78–100)
MONOCYTES # BLD AUTO: 0.6 10E9/L (ref 0–1.3)
MONOCYTES NFR BLD AUTO: 19.2 %
NEUTROPHILS # BLD AUTO: 2.1 10E9/L (ref 1.6–8.3)
NEUTROPHILS NFR BLD AUTO: 62.5 %
NITRATE UR QL: NEGATIVE
NON-SQ EPI CELLS #/AREA URNS LPF: ABNORMAL /LPF
PH UR STRIP: 5 PH (ref 5–7)
PLATELET # BLD AUTO: 379 10E9/L (ref 150–450)
RBC # BLD AUTO: 3.17 10E12/L (ref 3.8–5.2)
RBC #/AREA URNS AUTO: ABNORMAL /HPF (ref 0–2)
SP GR UR STRIP: 1.01 (ref 1–1.03)
URN SPEC COLLECT METH UR: ABNORMAL
UROBILINOGEN UR STRIP-ACNC: 0.2 EU/DL (ref 0.2–1)
WBC # BLD AUTO: 3.3 10E9/L (ref 4–11)
WBC #/AREA URNS AUTO: ABNORMAL /HPF (ref 0–2)

## 2017-03-30 PROCEDURE — 36415 COLL VENOUS BLD VENIPUNCTURE: CPT | Performed by: PHYSICIAN ASSISTANT

## 2017-03-30 PROCEDURE — 81001 URINALYSIS AUTO W/SCOPE: CPT | Performed by: PHYSICIAN ASSISTANT

## 2017-03-30 PROCEDURE — 99214 OFFICE O/P EST MOD 30 MIN: CPT | Performed by: PHYSICIAN ASSISTANT

## 2017-03-30 PROCEDURE — 80053 COMPREHEN METABOLIC PANEL: CPT | Performed by: PHYSICIAN ASSISTANT

## 2017-03-30 PROCEDURE — 85025 COMPLETE CBC W/AUTO DIFF WBC: CPT | Performed by: PHYSICIAN ASSISTANT

## 2017-03-30 NOTE — MR AVS SNAPSHOT
After Visit Summary   3/30/2017    Nena Underwood    MRN: [id number]           Patient Information     Date Of Birth          1975        Visit Information        Provider Department      3/30/2017 6:20 PM Brad Werner PA-C [facility] [place]        Today's Diagnoses     Acute pyelonephritis    -  1    Encounter for counseling regarding contraception           Follow-ups after your visit        Your next 10 appointments already scheduled     May 03, 2017  8:00 AM CDT   (Arrive by 7:45 AM)   New Patient Visit with Tracy Loving MD   [facility] and Infectious Diseases ([facility])    [address] [place] [address] [id number] [phone]            Jun 20, 2017  3:40 PM CDT   (Arrive by 3:25 PM)   Return Kidney Transplant with Walter Chong MD   [facility] ([facility])    [address] [place] [address] [id number] [phone]              Who to contact     If you have questions or need follow up information about today's clinic visit or your schedule please contact [facility] [place] directly at [phone].  Normal or non-critical lab and imaging results will be communicated to you by MyChart, letter or phone within 4 business days after the clinic has received the results. If you do not hear from us within 7 days, please contact the clinic through MyChart or phone. If you have a critical or abnormal lab result, we will notify you by phone as soon as possible.  Submit refill requests through [employer] or call your pharmacy and they will forward the refill request to us. Please allow 3 business days for your refill to be completed.          Additional Information About Your Visit        [employer] Information     [employer] gives you secure access to your electronic health record. If you see a primary care provider, you can also send messages to  your care team and make appointments. If you have questions, please call your primary care clinic.  If you do not have a primary care provider, please call [phone] and they will assist you.        Care EveryWhere ID     This is your Care EveryWhere ID. This could be used by other organizations to access your [place] medical records  [id number]        Your Vitals Were     Pulse Temperature Respirations Height Last Period Pulse Oximetry    84 98.7  F (37.1  C) 16 5' (1.524 m) 03/20/2017 (Exact Date) 99%    BMI (Body Mass Index)                   17.58 kg/m2            Blood Pressure from Last 3 Encounters:   03/30/17 92/56   03/23/17 108/73   03/13/17 97/68    Weight from Last 3 Encounters:   03/30/17 90 lb (40.8 kg)   03/21/17 92 lb (41.7 kg)   03/13/17 90 lb (40.8 kg)              We Performed the Following     *UA reflex to Microscopic and Culture ([place] and [facility] (except [place] and [place])     CBC with platelets differential     Comprehensive metabolic panel (BMP + Alb, Alk Phos, ALT, AST, Total. Bili, TP)     JUST IN CASE     Urine Microscopic        Primary Care Provider Office Phone # Fax #    Jorge L Tucker [phone]       [place] [phone]        Thank you!     Thank you for choosing [facility]  for your care. Our goal is always to provide you with excellent care. Hearing back from our patients is one way we can continue to improve our services. Please take a few minutes to complete the written survey that you may receive in the mail after your visit with us. Thank you!             Your Updated Medication List - Protect others around you: Learn how to safely use, store and throw away your medicines at www.disposemymeds.org.          This list is accurate as of: 3/30/17 11:59 PM.  Always use your most recent med list.                   Brand Name Dispense Instructions for use    azaTHIOprine 50 MG  tablet    IMURAN    180 tablet    Take 2 tablets (100 mg) by mouth daily       levofloxacin 500 MG tablet    LEVAQUIN    18 tablet    Take 1 tablet (500 mg) by mouth daily for 18 days       predniSONE 5 MG tablet    DELTASONE    90 tablet    TAKE ONE TABLET BY MOUTH EVERY DAY       sulfamethoxazole-trimethoprim 400-80 MG per tablet    BACTRIM/SEPTRA    90 tablet    Take 1 tablet by mouth daily       tacrolimus capsule     60 capsule    Take 1 capsule (0.5 mg) by mouth 2 times daily       TYLENOL PO      Take 500 mg by mouth every 4 hours as needed for mild pain or fever

## 2017-03-30 NOTE — PROGRESS NOTES
Chief Complaint   Patient presents with     Hospital F/U       Initial BP 92/56  Pulse 84  Temp 98.7  F (37.1  C)  Resp 16  Ht 5' (1.524 m)  Wt 90 lb (40.8 kg)  LMP 03/20/2017 (Exact Date)  SpO2 99%  BMI 17.58 kg/m2 Estimated body mass index is 17.58 kg/(m^2) as calculated from the following:    Height as of this encounter: 5' (1.524 m).    Weight as of this encounter: 90 lb (40.8 kg).  Medication Reconciliation: complete. ANISH Hughes LPN        SUBJECTIVE:                                                    Nena Underwood is a 42 year old female who presents to clinic today for the following health issues:          Hospital Follow-up Visit:    Hospital: [facility]  Date of Admission: 3/20/17  Date of Discharge: 3/23/17  Reason(s) for Admission: acute pyelonephritis            Problems taking medications regularly:  None       Medication changes since discharge:        Problems adhering to non-medication therapy:  None        Summary of hospitalization:  [facility] discharge summary reviewed  Diagnostic Tests/Treatments reviewed.  Follow up needed: CBC, cultures  Other Healthcare Providers Involved in Patient s Care:         Specialist appointment - may 3rd, transplant nephrology  Update since discharge: improved.     Post Discharge Medication Reconciliation: discharge medications reconciled, continue medications without change.  Plan of care communicated with patient     Coding guidelines for this visit:  Type of Medical   Decision Making Face-to-Face Visit       within 7 Days of discharge Face-to-Face Visit        within 14 days of discharge   Moderate Complexity [id number]   High Complexity [id number]          Recently discharged from [facility] following admission for pyelonephritis.  Hx of kidney transplant and is under the care of Dr. Chong.  Since she was d/hayley she has been feeling much better, no f/c pain, n/v/d. Would like to discuss birth control options as well today/      Problem  list and histories reviewed & adjusted, as indicated.  Additional history: as documented    Patient Active Problem List   Diagnosis     Kidney replaced by transplant     CARDIOVASCULAR SCREENING; LDL GOAL LESS THAN 100     Immunosuppressed status (H)     Moraxella catarrhalis pneumonia (H)     IUD (intrauterine device) in place     Pyelonephritis     Cervical cancer screening     Pyelonephritis, acute     Past Surgical History:   Procedure Laterality Date      SECTION  2014    Procedure:  SECTION;;  Surgeon: Griselda Becerra MD;  [facility]     HEMODIALYSIS VIA AV FISTULA      left arm     INSERT INTRAUTERINE DEVICE      Paraguard. removed      TRANSPLANT KIDNEY RECIPIENT LIVING UNRELATED  6/23/10       Social History   Substance Use Topics     Smoking status: Never Smoker     Smokeless tobacco: Never Used     Alcohol use No     Family History   Problem Relation Age of Onset     Respiratory Paternal Grandmother      PGRENATO had pulmonary TB at age 85, then  of old age at 89; she lived with [facility]     Eye Disorder Mother      retinal problems         Current Outpatient Prescriptions   Medication Sig Dispense Refill     levofloxacin (LEVAQUIN) 500 MG tablet Take 1 tablet (500 mg) by mouth daily for 18 days 18 tablet 0     predniSONE (DELTASONE) 5 MG tablet TAKE ONE TABLET BY MOUTH EVERY DAY 90 tablet 3     PROGRAF 0.5 MG PO CAPSULE Take 1 capsule (0.5 mg) by mouth 2 times daily 60 capsule 11     azaTHIOprine (IMURAN) 50 MG tablet Take 2 tablets (100 mg) by mouth daily 180 tablet 3     sulfamethoxazole-trimethoprim (BACTRIM,SEPTRA) 400-80 MG per tablet Take 1 tablet by mouth daily 90 tablet 3     Acetaminophen (TYLENOL PO) Take 500 mg by mouth every 4 hours as needed for mild pain or fever       Allergies   Allergen Reactions     Nkda [No Known Drug Allergies]        Reviewed and updated as needed this visit by clinical staff  Tobacco  Allergies  Meds  Surg Hx  Fam Hx   Soc Hx      Reviewed and updated as needed this visit by Provider         ROS:  Constitutional, HEENT, cardiovascular, pulmonary, gi and gu systems are negative, except as otherwise noted.    OBJECTIVE:                                                    BP 92/56  Pulse 84  Temp 98.7  F (37.1  C)  Resp 16  Ht 5' (1.524 m)  Wt 90 lb (40.8 kg)  LMP 03/20/2017 (Exact Date)  SpO2 99%  BMI 17.58 kg/m2  Body mass index is 17.58 kg/(m^2).  GENERAL: healthy, alert and no distress  EYES: Eyes grossly normal to inspection  RESP: lungs clear to auscultation - no rales, rhonchi or wheezes  CV: regular rate and rhythm, normal S1 S2, no S3 or S4, no murmur  ABDOMEN: soft, nontender, no hepatosplenomegaly, no masses and bowel sounds normal, no CVAT  PSYCH: mentation appears normal, affect normal/bright    Diagnostic Test Results:  Results for orders placed or performed in visit on 03/30/17 (from the past 24 hour(s))   CBC with platelets differential   Result Value Ref [facility]    WBC 3.3 (L) 4.0 - 11.0 10e9/L    RBC Count 3.17 (L) 3.8 - 5.2 10e12/L    Hemoglobin 11.2 (L) 11.7 - 15.7 g/dL    Hematocrit 33.0 (L) 35.0 - 47.0 %     (H) 78 - 100 fl    MCH 35.3 (H) 26.5 - 33.0 pg    MCHC 33.9 31.5 - 36.5 g/dL    RDW 12.2 10.0 - 15.0 %    Platelet Count 379 150 - 450 10e9/L    Diff Method Automated Method     % Neutrophils 62.5 %    % Lymphocytes 17.4 %    % Monocytes 19.2 %    % Eosinophils 0.6 %    % Basophils 0.3 %    Absolute Neutrophil 2.1 1.6 - 8.3 10e9/L    Absolute Lymphocytes 0.6 (L) 0.8 - 5.3 10e9/L    Absolute Monocytes 0.6 0.0 - 1.3 10e9/L    Absolute Eosinophils 0.0 0.0 - 0.7 10e9/L    Absolute Basophils 0.0 0.0 - 0.2 10e9/L   *UA reflex to Microscopic and Culture ([facility] and [facility] (except [place] and [place])   Result Value Ref [facility]    Color Urine Yellow     Appearance Urine Clear     Glucose Urine Negative NEG mg/dL    Bilirubin Urine Negative NEG    Ketones Urine Negative NEG mg/dL    Specific  Gravity Urine 1.015 1.003 - 1.035    Blood Urine Trace (A) NEG    pH Urine 5.0 5.0 - 7.0 pH    Protein Albumin Urine Negative NEG mg/dL    Urobilinogen Urine 0.2 0.2 - 1.0 EU/dL    Nitrite Urine Negative NEG    Leukocyte Esterase Urine Negative NEG    Source Midstream Urine    Urine Microscopic   Result Value Ref [facility]    WBC Urine O - 2 0 - 2 /HPF    RBC Urine 2-5 (A) 0 - 2 /HPF    Squamous Epithelial /LPF Urine Few FEW /LPF    Bacteria Urine Few (A) NEG /HPF        ASSESSMENT/PLAN:                                                        1. Acute pyelonephritis  Patient is afebrile today without pain, fatigue, n/v/d.  Hemodynamically stable.  CBC shows baseline HGB/HCT.  She is currently taking 18 more days of Levaquin and is unclear of what she should take when this course is finished.  I do not see this in the d/c instructions and given her complicated hx, will follow up with her transplant physician Dr Chong for recommendations following treatment with Levaquin.  I will reach out to patient when I have heard back from dr. Chong.  She is agreeable.  - CBC with platelets differential  - Comprehensive metabolic panel (BMP + Alb, Alk Phos, ALT, AST, Total. Bili, TP)  - JUST IN CASE  - *UA reflex to Microscopic and Culture ([facility] and [facility] (except [place] and [place])  - Urine Microscopic    2. Encounter for counseling regarding contraception  She is interested in contraceptions, we discussed different options including surgical options, implants, IUDs and OCPs.  She and her  will consider options and will follow up when she decides what she would like to do.      See Patient Instructions    Brad Werner PA-C  [facility] [place]

## 2017-03-31 LAB
ALBUMIN SERPL-MCNC: 3.4 G/DL (ref 3.4–5)
ALP SERPL-CCNC: 89 U/L (ref 40–150)
ALT SERPL W P-5'-P-CCNC: 22 U/L (ref 0–50)
ANION GAP SERPL CALCULATED.3IONS-SCNC: 10 MMOL/L (ref 3–14)
AST SERPL W P-5'-P-CCNC: 30 U/L (ref 0–45)
BILIRUB SERPL-MCNC: 0.3 MG/DL (ref 0.2–1.3)
BUN SERPL-MCNC: 22 MG/DL (ref 7–30)
CALCIUM SERPL-MCNC: 9.1 MG/DL (ref 8.5–10.1)
CHLORIDE SERPL-SCNC: 105 MMOL/L (ref 94–109)
CO2 SERPL-SCNC: 24 MMOL/L (ref 20–32)
CREAT SERPL-MCNC: 1.32 MG/DL (ref 0.52–1.04)
GFR SERPL CREATININE-BSD FRML MDRD: 44 ML/MIN/1.7M2
GLUCOSE SERPL-MCNC: 71 MG/DL (ref 70–99)
POTASSIUM SERPL-SCNC: 4 MMOL/L (ref 3.4–5.3)
PROT SERPL-MCNC: 7.7 G/DL (ref 6.8–8.8)
SODIUM SERPL-SCNC: 139 MMOL/L (ref 133–144)

## 2017-03-31 NOTE — PROGRESS NOTES
Nena-  Here are your recent results.       Your creatinine came back mildly elevated ( 1.3) which is increased from your baseline.  Over the weekend, please drink fluids, and return on Monday 04/3/2017 for a lab only visit to recheck your Creatinine.  Please call our office to schedule this.    If you have any questions please do not hesitate to contact our office via phone ([phone]) or you may send me a message via Aventeon by clicking the contact my Care Team link.      It was a pleasure seeing you and participating in your care!    Thank you,    Brad Werner MPH, PA-C  [address] [place] [address] [phone]

## 2017-04-06 ENCOUNTER — TELEPHONE (OUTPATIENT)
Dept: FAMILY MEDICINE | Facility: CLINIC | Age: 42
End: 2017-04-06

## 2017-04-06 DIAGNOSIS — N10 ACUTE PYELONEPHRITIS: Primary | ICD-10-CM

## 2017-04-06 NOTE — TELEPHONE ENCOUNTER
Called patient regarding recommendations for renal US from her transplant dr, Dr. Chong.  LM for her to call me back. Dr. Chong is recommending a repeat US 3 weeks after she is done with antibiotics.  Please let her know this, I can order this to be done at that time ( first week of may).  I am happy to speak with her as well if she has questions

## 2017-04-06 NOTE — TELEPHONE ENCOUNTER
patient has an appointment with the infectious disease doctor that week    patient wants to have this done at the transplant clinic at the [facility].      Ok to leave detailed  eric Mcgraw RN  [facility]  [phone]

## 2017-04-07 NOTE — TELEPHONE ENCOUNTER
Order placed for US at [facility].. Unsure if they will call patient to schedule or if patient needs to call and schedule this, can we call her transplant coordinator and find this out? TRANSPLANT COORDINATOR AYDIN [phone]

## 2017-04-08 DIAGNOSIS — N10 ACUTE PYELONEPHRITIS: ICD-10-CM

## 2017-04-08 DIAGNOSIS — Z79.899 ENCOUNTER FOR LONG-TERM CURRENT USE OF MEDICATION: ICD-10-CM

## 2017-04-08 DIAGNOSIS — Z48.298 AFTERCARE FOLLOWING ORGAN TRANSPLANT: ICD-10-CM

## 2017-04-08 DIAGNOSIS — Z94.0 KIDNEY REPLACED BY TRANSPLANT: ICD-10-CM

## 2017-04-08 LAB
ANION GAP SERPL CALCULATED.3IONS-SCNC: 8 MMOL/L (ref 3–14)
BUN SERPL-MCNC: 18 MG/DL (ref 7–30)
CALCIUM SERPL-MCNC: 8.9 MG/DL (ref 8.5–10.1)
CHLORIDE SERPL-SCNC: 107 MMOL/L (ref 94–109)
CO2 SERPL-SCNC: 25 MMOL/L (ref 20–32)
CREAT SERPL-MCNC: 1.13 MG/DL (ref 0.52–1.04)
ERYTHROCYTE [DISTWIDTH] IN BLOOD BY AUTOMATED COUNT: 13 % (ref 10–15)
GFR SERPL CREATININE-BSD FRML MDRD: 52 ML/MIN/1.7M2
GLUCOSE SERPL-MCNC: 80 MG/DL (ref 70–99)
HCT VFR BLD AUTO: 35 % (ref 35–47)
HGB BLD-MCNC: 11.8 G/DL (ref 11.7–15.7)
MCH RBC QN AUTO: 35.4 PG (ref 26.5–33)
MCHC RBC AUTO-ENTMCNC: 33.7 G/DL (ref 31.5–36.5)
MCV RBC AUTO: 105 FL (ref 78–100)
PLATELET # BLD AUTO: 352 10E9/L (ref 150–450)
POTASSIUM SERPL-SCNC: 4 MMOL/L (ref 3.4–5.3)
RBC # BLD AUTO: 3.33 10E12/L (ref 3.8–5.2)
SODIUM SERPL-SCNC: 140 MMOL/L (ref 133–144)
WBC # BLD AUTO: 4.1 10E9/L (ref 4–11)

## 2017-04-10 NOTE — PROGRESS NOTES
Nena-  Your creatinine has improved since previous, now 1.13 which is improved from 1.32. Please continue to stay well hydrated.  I have ordered your ultrasound at the [facility].  This should be done the first week of May, and the infectious disease doctor will help to determine which antibiotics you should stay on long term.  Please let me know if you have any questions!    If you have any questions please do not hesitate to contact our office via phone ([phone]) or you may send me a message via Revolv by clicking the contact my Care Team link.      It was a pleasure seeing you and participating in your care!    Thank you,    Brad Werner MPH, PA-C  [address] [place] [address] [phone]

## 2017-04-11 NOTE — TELEPHONE ENCOUNTER
Left detailed message for transplant coordinator. Asked her to contact us if any f/u is required. Left non detailed message for patient to return call and inform of below.   Ashley Mario RN   [facility] - Triage

## 2017-04-25 DIAGNOSIS — D84.9 IMMUNOSUPPRESSED STATUS (H): ICD-10-CM

## 2017-04-25 RX ORDER — SULFAMETHOXAZOLE AND TRIMETHOPRIM 400; 80 MG/1; MG/1
1 TABLET ORAL DAILY
Qty: 90 TABLET | Refills: 3 | Status: SHIPPED | OUTPATIENT
Start: 2017-04-25 | End: 2017-05-24

## 2017-04-27 ENCOUNTER — OFFICE VISIT (OUTPATIENT)
Dept: FAMILY MEDICINE | Facility: CLINIC | Age: 42
End: 2017-04-27
Payer: COMMERCIAL

## 2017-04-27 VITALS
DIASTOLIC BLOOD PRESSURE: 62 MMHG | TEMPERATURE: 99.4 F | HEIGHT: 60 IN | HEART RATE: 76 BPM | WEIGHT: 90 LBS | SYSTOLIC BLOOD PRESSURE: 96 MMHG | BODY MASS INDEX: 17.67 KG/M2 | OXYGEN SATURATION: 98 %

## 2017-04-27 DIAGNOSIS — Z30.09 GENERAL COUNSELING AND ADVICE FOR CONTRACEPTIVE MANAGEMENT: Primary | ICD-10-CM

## 2017-04-27 PROCEDURE — 99213 OFFICE O/P EST LOW 20 MIN: CPT | Performed by: PHYSICIAN ASSISTANT

## 2017-04-27 NOTE — NURSING NOTE
Chief Complaint   Patient presents with     Contraception       Initial BP 96/62 (BP Location: Right arm, Patient Position: Chair, Cuff Size: Adult Regular)  Pulse 76  Temp 99.4  F (37.4  C) (Tympanic)  Ht 5' (1.524 m)  Wt 90 lb (40.8 kg)  SpO2 98%  BMI 17.58 kg/m2 Estimated body mass index is 17.58 kg/(m^2) as calculated from the following:    Height as of this encounter: 5' (1.524 m).    Weight as of this encounter: 90 lb (40.8 kg).  Medication Reconciliation: complete     Mimi Burrell CMA

## 2017-04-27 NOTE — PROGRESS NOTES
SUBJECTIVE:                                                    Nena Underwood is a 42 year old female who presents to clinic today for the following health issues:      Concern - talk about birth control     Onset:     Description:   Not currently on bc     Intensity: mild    Progression of Symptoms:      Accompanying Signs & Symptoms:         Previous history of similar problem:       Precipitating factors:   Worsened by:     Alleviating factors:  Improved by:        Therapies Tried and outcome:       Nena is interested in birth control at this time.  She has been reading about different options and is interested in Nexplanon at this time.  No concerns for pregnancy.  Has hx of kidney transplant, otherwise healthy      Problem list and histories reviewed & adjusted, as indicated.  Additional history: as documented    Patient Active Problem List   Diagnosis     Kidney replaced by transplant     CARDIOVASCULAR SCREENING; LDL GOAL LESS THAN 100     Immunosuppressed status (H)     Moraxella catarrhalis pneumonia (H)     IUD (intrauterine device) in place     Pyelonephritis     Cervical cancer screening     Pyelonephritis, acute     Past Surgical History:   Procedure Laterality Date      SECTION  2014    Procedure:  SECTION;;  Surgeon: Griselda Becerra MD;  [facility]     HEMODIALYSIS VIA AV FISTULA      left arm     INSERT INTRAUTERINE DEVICE      Paraguard. removed      TRANSPLANT KIDNEY RECIPIENT LIVING UNRELATED  6/23/10       Social History   Substance Use Topics     Smoking status: Never Smoker     Smokeless tobacco: Never Used     Alcohol use No     Family History   Problem Relation Age of Onset     Respiratory Paternal Grandmother      PGM had pulmonary TB at age 85, then  of old age at 89; she lived with Nena     Eye Disorder Mother      retinal problems         Current Outpatient Prescriptions   Medication Sig Dispense Refill      sulfamethoxazole-trimethoprim (BACTRIM/SEPTRA) 400-80 MG per tablet Take 1 tablet by mouth daily 90 tablet 3     predniSONE (DELTASONE) 5 MG tablet TAKE ONE TABLET BY MOUTH EVERY DAY 90 tablet 3     PROGRAF 0.5 MG PO CAPSULE Take 1 capsule (0.5 mg) by mouth 2 times daily 60 capsule 11     azaTHIOprine (IMURAN) 50 MG tablet Take 2 tablets (100 mg) by mouth daily 180 tablet 3     Acetaminophen (TYLENOL PO) Take 500 mg by mouth every 4 hours as needed for mild pain or fever       Allergies   Allergen Reactions     Nkda [No Known Drug Allergies]        Reviewed and updated as needed this visit by clinical staff       Reviewed and updated as needed this visit by Provider         ROS:  Constitutional, HEENT, cardiovascular, pulmonary, gi and gu systems are negative, except as otherwise noted.    OBJECTIVE:                                                    BP 96/62 (BP Location: Right arm, Patient Position: Chair, Cuff Size: Adult Regular)  Pulse 76  Temp 99.4  F (37.4  C) (Tympanic)  Ht 5' (1.524 m)  Wt 90 lb (40.8 kg)  SpO2 98%  BMI 17.58 kg/m2  Body mass index is 17.58 kg/(m^2).  GENERAL: healthy, alert and no distress  PSYCH: mentation appears normal, affect normal/bright         ASSESSMENT/PLAN:                                                        1. General counseling and advice for contraceptive management  Appointment made for Nexplanon placement with Dr. Tucker.  Uses and SE of Nexplanon discussed and understood by patient.  She will return for procedure      Total time: 15 minutes, > 50% of time spent counseling        See Patient Instructions    Brad Werner PA-C  [facility]

## 2017-04-27 NOTE — MR AVS SNAPSHOT
After Visit Summary   4/27/2017    Nena Underwood    MRN: [id number]           Patient Information     Date Of Birth          1975        Visit Information        Provider Department      4/27/2017 6:20 PM Brad Werner PA-C [facility] [place]         Follow-ups after your visit        Your next 10 appointments already scheduled     May 03, 2017  8:00 AM CDT   (Arrive by 7:45 AM)   New Patient Visit with Tracy Loving MD   [facility] ([facility])    [address]  3rd [address]  [place] [address] [phone]            May 03, 2017  9:00 AM CDT   US RENAL COMPLETE WITH DUPLEX COMPLETE with UCUSV1   [facility] ([facility])    [address]  [address]st [address]  [place] [address] [phone]           Please bring a list of your medicines (including vitamins, minerals and over-the-counter drugs). Also, tell your doctor about any allergies you may have. Wear comfortable clothes and leave your valuables at home.  Adults: No eating or drinking for 8 hours before the exam. You may take medicine with a small sip of water.  Children: - Children 6+ years: No food or drink for 6 hours before exam. - Children 1-5 years: No food or drink for 4 hours before exam. - Infants, breast-fed: may have breast milk up to 2 hours before exam. - Infants, formula: may have bottle until 4 hours before exam.  Please call the Imaging Department at your exam site with any questions.            May 11, 2017  6:20 PM CDT   Office Visit with Jorge L Tucker MD   [facility] [place] ([facility])    [address] [id number] [phone]           Bring a current list of meds and any records pertaining to this visit.  For Physicals, please bring immunization records and any forms needing to be filled out.  Please arrive 10  minutes early to complete paperwork.            Jun 20, 2017  3:40 PM CDT   (Arrive by 3:25 PM)   Return Kidney Transplant with Walter Chong MD   [facility] ([facility])    [address] [address] [phone]              Who to contact     If you have questions or need follow up information about today's clinic visit or your schedule please contact [facility] [place] directly at [phone].  Normal or non-critical lab and imaging results will be communicated to you by BotScannerhart, letter or phone within 4 business days after the clinic has received the results. If you do not hear from us within 7 days, please contact the clinic through Abcodiat or phone. If you have a critical or abnormal lab result, we will notify you by phone as soon as possible.  Submit refill requests through [employer] or call your pharmacy and they will forward the refill request to us. Please allow 3 business days for your refill to be completed.          Additional Information About Your Visit        BotScannerharOneAway Information     [employer] gives you secure access to your electronic health record. If you see a primary care provider, you can also send messages to your care team and make appointments. If you have questions, please call your primary care clinic.  If you do not have a primary care provider, please call [phone] and they will assist you.        Care EveryWhere ID     This is your Care EveryWhere ID. This could be used by other organizations to access your [facility] medical records  [id number]        Your Vitals Were     Pulse Temperature Height Pulse Oximetry BMI (Body Mass Index)       76 99.4  F (37.4  C) (Tympanic) 5' (1.524 m) 98% 17.58 kg/m2        Blood Pressure from Last 3 Encounters:   04/27/17 96/62   03/30/17 92/56   03/23/17 108/73    Weight from Last 3 Encounters:   04/27/17 90 lb (40.8 kg)   03/30/17 90 lb (40.8 kg)   03/21/17 92 lb  (41.7 kg)              Today, you had the following     No orders found for display       Primary Care Provider Office Phone # Fax #    Jorge L Tucker [phone]       [address] [address]        Thank you!     Thank you for choosing [facility]  for your care. Our goal is always to provide you with excellent care. Hearing back from our patients is one way we can continue to improve our services. Please take a few minutes to complete the written survey that you may receive in the mail after your visit with us. Thank you!             Your Updated Medication List - Protect others around you: Learn how to safely use, store and throw away your medicines at www.disposemymeds.org.          This list is accurate as of: 4/27/17  6:44 PM.  Always use your most recent med list.                   Brand Name Dispense Instructions for use    azaTHIOprine 50 MG tablet    IMURAN    180 tablet    Take 2 tablets (100 mg) by mouth daily       predniSONE 5 MG tablet    DELTASONE    90 tablet    TAKE ONE TABLET BY MOUTH EVERY DAY       sulfamethoxazole-trimethoprim 400-80 MG per tablet    BACTRIM/SEPTRA    90 tablet    Take 1 tablet by mouth daily       tacrolimus capsule     60 capsule    Take 1 capsule (0.5 mg) by mouth 2 times daily       TYLENOL PO      Take 500 mg by mouth every 4 hours as needed for mild pain or fever

## 2017-05-03 ENCOUNTER — TELEPHONE (OUTPATIENT)
Dept: FAMILY MEDICINE | Facility: CLINIC | Age: 42
End: 2017-05-03

## 2017-05-03 ENCOUNTER — OFFICE VISIT (OUTPATIENT)
Dept: INFECTIOUS DISEASES | Facility: CLINIC | Age: 42
End: 2017-05-03
Attending: INTERNAL MEDICINE
Payer: COMMERCIAL

## 2017-05-03 VITALS
SYSTOLIC BLOOD PRESSURE: 112 MMHG | RESPIRATION RATE: 18 BRPM | HEART RATE: 75 BPM | BODY MASS INDEX: 17.74 KG/M2 | WEIGHT: 90.83 LBS | TEMPERATURE: 97.4 F | DIASTOLIC BLOOD PRESSURE: 74 MMHG

## 2017-05-03 DIAGNOSIS — D84.9 IMMUNOSUPPRESSED STATUS (H): ICD-10-CM

## 2017-05-03 DIAGNOSIS — A49.8 KLEBSIELLA PNEUMONIAE INFECTION: Primary | ICD-10-CM

## 2017-05-03 DIAGNOSIS — Z23 NEED FOR PNEUMOCOCCAL VACCINATION: ICD-10-CM

## 2017-05-03 DIAGNOSIS — Z94.0 KIDNEY TRANSPLANTED: ICD-10-CM

## 2017-05-03 DIAGNOSIS — Z87.440 HISTORY OF RECURRENT URINARY TRACT INFECTION: ICD-10-CM

## 2017-05-03 LAB
ALBUMIN UR-MCNC: NEGATIVE MG/DL
APPEARANCE UR: CLEAR
BACTERIA #/AREA URNS HPF: ABNORMAL /HPF
BILIRUB UR QL STRIP: NEGATIVE
COLOR UR AUTO: YELLOW
GLUCOSE UR STRIP-MCNC: NEGATIVE MG/DL
HGB UR QL STRIP: NEGATIVE
KETONES UR STRIP-MCNC: NEGATIVE MG/DL
LEUKOCYTE ESTERASE UR QL STRIP: NEGATIVE
MUCOUS THREADS #/AREA URNS LPF: PRESENT /LPF
NITRATE UR QL: NEGATIVE
PH UR STRIP: 6 PH (ref 5–7)
RBC #/AREA URNS AUTO: 1 /HPF (ref 0–2)
SP GR UR STRIP: 1.01 (ref 1–1.03)
SQUAMOUS #/AREA URNS AUTO: 2 /HPF (ref 0–1)
URN SPEC COLLECT METH UR: ABNORMAL
UROBILINOGEN UR STRIP-MCNC: 0 MG/DL (ref 0–2)
WBC #/AREA URNS AUTO: 1 /HPF (ref 0–2)

## 2017-05-03 PROCEDURE — 81001 URINALYSIS AUTO W/SCOPE: CPT | Performed by: INTERNAL MEDICINE

## 2017-05-03 PROCEDURE — 90732 PPSV23 VACC 2 YRS+ SUBQ/IM: CPT | Mod: ZF | Performed by: INTERNAL MEDICINE

## 2017-05-03 PROCEDURE — 99212 OFFICE O/P EST SF 10 MIN: CPT | Mod: ZF

## 2017-05-03 PROCEDURE — 25000128 H RX IP 250 OP 636: Mod: ZF | Performed by: INTERNAL MEDICINE

## 2017-05-03 PROCEDURE — G0009 ADMIN PNEUMOCOCCAL VACCINE: HCPCS

## 2017-05-03 RX ADMIN — PNEUMOCOCCAL VACCINE POLYVALENT 0.5 ML
25; 25; 25; 25; 25; 25; 25; 25; 25; 25; 25; 25; 25; 25; 25; 25; 25; 25; 25; 25; 25; 25; 25 INJECTION, SOLUTION INTRAMUSCULAR; SUBCUTANEOUS at 08:56

## 2017-05-03 ASSESSMENT — PAIN SCALES - GENERAL: PAINLEVEL: NO PAIN (0)

## 2017-05-03 NOTE — NURSING NOTE
Chief Complaint   Patient presents with     Consult     Recurring uti        Initial /74 (Cuff Size: Adult Small)  Pulse 75  Temp 97.4  F (36.3  C) (Oral)  Resp 18  Wt 41.2 kg (90 lb 13.3 oz)  BMI 17.74 kg/m2 Estimated body mass index is 17.74 kg/(m^2) as calculated from the following:    Height as of 4/27/17: 1.524 m (5').    Weight as of this encounter: 41.2 kg (90 lb 13.3 oz).  Medication Reconciliation: complete

## 2017-05-03 NOTE — LETTER
5/3/2017       RE: Nena Underwood  [address]   [place] [address]     Dear Colleague,    Thank you for referring your patient, Nena Underwood, to the [facility] AND INFECTIOUS DISEASES at [facility]. Please see a copy of my visit note below.    [facility]  Transplant Infectious Disease Clinic Note     Patient:  Nena Underwood, Date of birth 1975, Medical record number [id number]  Date of Visit:  05/03/2017         Assessment and Recommendations:   Recommendations:  - UA with micro and reflex to culture today, to check to see if any of the Klebsiella infection is still present.   - Bloodwork for ~ 6/14/2017 to include fasting transplant labs, but also a check of CD4 count, inflammatory markers, and transplant virus levels.  - Await results of renal ultrasound being done later today.  - Based on the results of the ultrasound, we may order a VCUG, to see if there is urine reflux from the bladder into the transplant ureter.   - Keep appt with  on 6/20/2017.  - Will administer pnumococcal 23 today.   - Return to ID clinic still to be determined if it is needed. However, even if there is no immediate f/u for the current problem of UTIs, if new questions or issues arise, Nena can always be scheduled in for a followup visit at any time.     Assessment:  Nena is a 42 year old woman s/p LDKT in 2010.  Infectious Disease issues include:  - Recurrent UTIs. For the most part due to colon organisms. She has risk factors for UTI with IUDs (2 in 5 years) and variable episodes of loose stool while working in a . UA with micro and reflex to culture today, to check to see if any of the Klebsiella infection is still present. However, her transplant ureter remains dilated, so important to check for reflux. Based on the results of a renal ultrasound scheduled for later today, we will probably order a VCUG, to  see if there is urine reflux from the bladder into the transplant ureter. This test will be done prior to appt with Dr. Chong on 6/20/2017. Bloodwork for ~ 6/14/2017 to include a check of inflammatory markers.  - Hx Influenza H1N1 infection 3/29/2016, treated with 5 days of tamiflu.  - Hx of Moraxella (Branhamella) catarrhalis & Haemophilus bacteria in 3/28/2016 sputum culture.  - Pneumocystis prophylaxis: Bactrim. Will check CD4 count with ~ 6/14/2017 bloodwork to see if bactrim can be d/c.   - Viral serostatus: CMV+, EBV+, VZV+  - Immunization status: due for pnumococcal 23   - Gamma globulin status: 1150 in 2011, no hx of low IgG levels  - Isolation status:  Good hand hygiene.    Tracy Loving MD. Pager [phone]         History of the Infectious Disease lllness:   Nena is a 42 year old female from the [facility], immigrated in 2007, born w/ small kidneys, dialysis in June 2008 s/p LDKT 6/23/10. 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 Post-transplant course notable for recurrent UTIs. 3 episodes, q2 weeks to 1 month apart, in July of 2011. Cystoscopy at that time showed a normal urethra, bladder w/ 1 trabeculation. She took low dose keflex for 6 months w/out recurrence of infection. Of note, she did not have frequent UTIs pre-transplant and does note them post copulation. Paragard IUD was removed 8/21/2012, with development of a UTI. She was seen in the infusion center for IV ampicillin in 10/2012, followed by ciprofloxacin, for an enterococcal UTI. 10/8/2012 CT showed the transplant kidney in the right lower quadrant with areas of segmental decreased perfusion in the cortex worrisome for pyelonephritis. Paragrad IUD was reinserted 4/3/2014, and she did ok for a while. In 2015 she developed pneumonia. In 2016 she had frequent Klebsiella UTIs. She had some discomfort with the IUD being in place, and she was wondering if the IUD  was contributing to the frequent low grade UTIs in 2015 & 2016. When Paragard IUD was last removed 3/13/2017, she developed one last pyelonephritis episode on 3/20/2017 which was then treated, and the UTIs stopped again. She works in a type of , the [employer] in [place], and she is currently doing the infant level, so she is frequently exposed to organisms that cause childhood respiratory infections and childhood diarrhea. Her 3-year old son Kentrell is also in class at the same . She has loose BMs from time to time.   Transplants:  2010 (Kidney); Postoperative day:  2506.  Coordinator Griselda Mccormick    Review of Systems:  CONSTITUTIONAL:  No fevers or chills. No night sweats.  EYES: negative for icterus, no current vision issues.   ENT:  negative for hearing loss, tinnitus or sore throat  RESPIRATORY:  negative for cough, sputum, dyspnea  CARDIOVASCULAR:  negative for chest pain, palpitations  GASTROINTESTINAL:  negative for nausea, vomiting, but she has loose bowel movements from time to time (including x the last 3 weeks).   GENITOURINARY:  negative for dysuria, frequency, urgency  HEME:  She has some easy bruising  INTEGUMENT:  negative for rash or pruritus  NEURO:  Negative for headache    Past Medical History:   Diagnosis Date     Anemia      High risk medication use      Hyperparathyroidism, secondary renal (H)      Immunosuppressed status (H)      Kidney replaced by transplant 6/23/10    KIDNEY TRANSPLANT STATUS     Moraxella catarrhalis pneumonia (HCC) 3/28/2016     Pneumonia 2013    requiring hospitalization     Renal aplasia     since birth     Single seizure (H)     felt secondary to uremia     TB (pulmonary tuberculosis)     treated with 4 drug regimen (INH, rifampin, ethambutol and pyrazinamide) for 6 months       Past Surgical History:   Procedure Laterality Date      SECTION  2014    Procedure:  SECTION;;  Surgeon: Griselda Becerra MD;   Location: [facility] L[facility]     HEMODIALYSIS VIA AV FISTULA      left arm     INSERT INTRAUTERINE DEVICE      Paraguard. removed      TRANSPLANT KIDNEY RECIPIENT LIVING UNRELATED  6/23/10       Family History   Problem Relation Age of Onset     Respiratory Paternal Grandmother      PGRENATO had pulmonary TB at age 85, then  of old age at 89; she lived with Nena     Eye Disorder Mother      retinal problems       Social History     Social History Narrative    Ms. Underwood emigrated from the [facility] in May, 2007. She is currently living with her  in [place], [place].  She works for the [employer] in [place] (a ).      Social History   Substance Use Topics     Smoking status: Never Smoker     Smokeless tobacco: Never Used     Alcohol use No       Immunization History   Administered Date(s) Administered     Hepatitis B 2008, 2008, 10/03/2008     Influenza (IIV3) 2008, 2011, 2012, 10/17/2013     Influenza Vaccine IM 3yrs+ 4 Valent IIV4 2017     Mantoux 2008     TDAP Vaccine (Adacel) 2013       Patient Active Problem List   Diagnosis     Kidney replaced by transplant     CARDIOVASCULAR SCREENING; LDL GOAL LESS THAN 100     Immunosuppressed status (H)     Moraxella catarrhalis pneumonia (H)     Pyelonephritis     Cervical cancer screening     Pyelonephritis, acute     Klebsiella pneumoniae infection     History of recurrent urinary tract infection       Current Outpatient Prescriptions   Medication Sig     sulfamethoxazole-trimethoprim (BACTRIM/SEPTRA) 400-80 MG per tablet Take 1 tablet by mouth daily     predniSONE (DELTASONE) 5 MG tablet TAKE ONE TABLET BY MOUTH EVERY DAY     PROGRAF 0.5 MG PO CAPSULE Take 1 capsule (0.5 mg) by mouth 2 times daily     azaTHIOprine (IMURAN) 50 MG tablet Take 2 tablets (100 mg) by mouth daily     Acetaminophen (TYLENOL PO) Take 500 mg by mouth every 4 hours as needed for mild pain or fever     No current  facility-administered medications for this visit.        Allergies   Allergen Reactions     Nkda [No Known Drug Allergies]             Physical Exam:   Vitals were reviewed.  All vitals stable  /74 (Cuff Size: Adult Small)  Pulse 75  Temp 97.4  F (36.3  C) (Oral)  Resp 18  Wt 41.2 kg (90 lb 13.3 oz)  BMI 17.74 kg/m2    Exam:  GENERAL:  well-developed, well-nourished young woman, alert, oriented, in no acute distress.  HEENT:  Head is normocephalic, atraumatic   EYES:  Eyes have anicteric sclerae.    ENT:  Oropharynx is moist without exudates or ulcers.  NECK:  Supple.  LUNGS:  Clear to auscultation.  CARDIOVASCULAR:  Regular rate and rhythm with no murmur. No ankle edema.  ABDOMEN:  Normal bowel sounds, soft, nontender over transplanted kidney in RLQ.  SKIN:  No acute rashes.  Left forearm dialysis fistula with no bruit.   NEUROLOGIC:  Grossly nonfocal.         Laboratory Data:     Absolute CD4   Date Value Ref Range Status   12/28/2016 157 (L) 441 - 2156 cells/uL Final   09/08/2016 143 (L) 441 - 2156 cells/uL Final   03/29/2016 47 (L) 441 - 2156 cells/uL Final   08/09/2011 222 mm3 Final       Inflammatory Markers    Recent Labs   Lab Test  09/21/11   1030   SED  16   CRP  <5.0       Immune Globulin Studies     Recent Labs   Lab Test  09/21/11   1030  08/09/11   0909   IGG   --   1150   IGE  6   --        Metabolic Studies    Recent Labs   Lab Test  04/08/17   1036  03/30/17   1816  03/23/17   0722   03/22/17   0159   03/07/15   0551   01/20/14   0903   02/15/13   2220   NA  140  139  143   < >   --    < >  140   < >  140   < >  139   POTASSIUM  4.0  4.0  3.3*   < >   --    < >  4.8   < >  4.2   < >  3.3*   CHLORIDE  107  105  111*   < >   --    < >  109   < >  108   < >  102   CO2  25  24  24   < >   --    < >  22   < >  16*   < >  25   ANIONGAP  8  10  7   < >   --    < >  8   < >  15.6   < >  12   BUN  18  22  17   < >   --    < >  12   < >  24   < >  22   CR  1.13*  1.32*  1.16*   < >   --    < >   0.90   < >  1.30*   < >  1.00   GFRESTIMATED  52*  44*  51*   < >   --    < >  69   < >  45*   < >  61   GLC  80  71  74   < >   --    < >  89   < >  203*   < >  94   KARINA  8.9  9.1  7.9*   < >   --    < >  8.9   < >  8.8   < >  9.8   PHOS   --    --    --    --    --    --   2.7   < >   --    --    --    MAG   --    --    --    --    --    --   1.6   < >   --    --    --    URIC   --    --    --    --    --    --    --    --   5.4   --    --    RANDY   --    --    --    --    --    --    --    --    --    --   21.2   LACT   --    --    --    --   0.6*   < >   --    < >   --    --   1.2    < > = values in this interval not displayed.       Hepatic Studies    Recent Labs   Lab Test  03/30/17   1816  03/20/17   2136  02/04/17   1144  03/21/16 2010 02/06/14   0814   BILITOTAL  0.3  0.9  0.5  0.5   < >  1.3   BILIDELTA   --    --    --    --    --   0.9*   BILICONJ   --    --    --    --    --   0.0   DBIL   --    --    --   0.1   --    --    ALKPHOS  89  81  98  99   < >  171*   PROTTOTAL  7.7  7.4  7.8  8.0   < >  7.7   ALBUMIN  3.4  3.4  3.2*  3.5   < >  3.7*   AST  30  38  38  37   < >  99*   ALT  22  36  33  35   < >  113*    < > = values in this interval not displayed.       Pancreatitis testing    Recent Labs   Lab Test  02/14/17   0850  10/02/15   2040  09/27/15   1200  09/25/15   1330   12/21/10   0931   LIPASE   --   208  210  254   < >   --    TRIG  74   --    --    --    --   80    < > = values in this interval not displayed.       Gout Labs      Recent Labs   Lab Test  01/20/14   0903   URIC  5.4       Recent Labs   Lab Test  02/14/17   0850  12/21/10   0931  06/22/10   0737   CHOL  118  137  144   HDL  53  43*  45*   LDL  50  78  80   TRIG  74  80  98   CHOLHDLRATIO   --   3.2  3.2       Hematology Studies     Recent Labs   Lab Test  04/08/17   1036  03/30/17   1816  03/23/17   0722  03/22/17   0933   03/20/17   2136   WBC  4.1  3.3*  5.4  10.4   < >  6.5   ANEU   --   2.1   --    --    --   5.4   ALYM    --   0.6*   --    --    --   0.7*   DAYSI   --   0.6   --    --    --   0.4   AEOS   --   0.0   --    --    --   0.0   HGB  11.8  11.2*  8.6*  9.1*   < >  11.0*   HCT  35.0  33.0*  24.9*  26.9*   < >  31.3*   PLT  352  379  148*  190   < >  233    < > = values in this interval not displayed.       Clotting Studies    Recent Labs   Lab Test  03/06/15   0538  02/06/14   0814  02/15/13   2220  07/14/10   0742   INR  1.35*  0.95  1.03  0.95   PTT  40*  35   --    --        Urine Studies     Recent Labs   Lab Test  03/30/17   1908  03/20/17   2138  03/20/17   1810  03/13/17   1157  03/02/17   0927   URINEPH  5.0  5.0  5.5  5.0  5.0   NITRITE  Negative  Negative  Positive*  Negative  Positive*   LEUKEST  Negative  Large*  Moderate*  Negative  Small*   WBCU  O - 2  >182*  10-25*  O - 2  10-25*       Medication levels    Recent Labs   Lab Test  03/23/17   0722   08/03/12   0540   07/16/12   0807   07/16/10   0900   VANCOMYCIN   --    --   7.4   --    --    --    --    CYCLSP   --    --    --    --    --    --   164   TACROL  6.9   < >   --    < >  3.0*   < >   --    MPACID   --    --    --    --   <0.25*   < >  2.52   MPAG   --    --    --    --   <6.5*   < >  122.5*    < > = values in this interval not displayed.       Microbiology:  Last 6 Culture results with specimen source  Culture Micro   Date Value Ref Range Status   03/21/2017 No growth  Final   03/21/2017 No growth  Final   03/20/2017 (A)  Final    50,000 to 100,000 colonies/mL Klebsiella pneumoniae   03/20/2017 (A)  Final    50,000 to 100,000 colonies/mL Klebsiella pneumoniae   03/13/2017 No growth  Final   03/02/2017 10,000 to 50,000 colonies/mL Klebsiella pneumoniae (A)  Final    Specimen Description   Date Value Ref Range Status   03/23/2017 Feces  Final   03/21/2017 Blood Right Hand  Final   03/21/2017 Blood Right Arm  Final   03/20/2017 Midstream Urine  Final   03/20/2017 Midstream Urine  Final   03/13/2017 Urine  Final        Last check of C difficile  C Diff  Toxin B PCR   Date Value Ref Range Status   03/23/2017  NEG Final    Negative  Negative: Clostridium difficile target DNA sequences NOT detected, presumed   negative for Clostridium difficile toxin B or the number of bacteria present   may be below the limit of detection for the test.   FDA approved assay performed using [employer] GeneXpert real-time PCR.   A negative result does not exclude actual disease due to Clostridium difficile   and may be due to improper collection, handling and storage of the specimen or   the number of organisms in the specimen is below the detection limit of the   assay.         Virology:  Influenza virus testing    Recent Labs   Lab Test  03/29/16   1700  03/28/16   1618   ISPEC   --   Nasopharyngeal   RVSPEC  Nasopharyngeal   --    AFLU   --   Negative   BFLU   --   Negative   Test results must be correlated with clinical data. If necessary, results   should be confirmed by a molecular assay or viral culture.     IFLUA  Positive   Critical Value/Significant Value called to and read back by  JORGE ROTH RN ON 5B @1438 ON 3/30/16. CMS.  *   --    IFLUB  Negative   --    FLUAH1  Negative   --    FLUAH3  Negative   --    [id number]  Positive   Critical Value/Significant Value called to and read back by  JORGE ROTH RN ON 5B @1438 ON 3/30/16. CMS.  *   --        CMV viral loads    Recent Labs   Lab Test  02/16/13   0030  10/08/12   1205  09/06/12   0940  08/02/12   0740  07/15/11   1113   CSPEC  Whole blood, EDTA anticoagulant  Whole blood, EDTA anticoagulant  Whole blood, EDTA anticoagulant  Whole blood, EDTA anticoagulant CORRECTED ON 08/03 AT 1047: PREVIOUSLY REPORTED AS EDTA BLOOD  Whole Blood CORRECTED ON 07/16 AT 1514: PREVIOUSLY REPORTED AS 2130       EBV DNA Copies/mL   Date Value Ref Range Status   07/15/2011 <1000 <1000 Copies/mL Final       BK viral loads   Recent Labs   Lab Test  11/07/12   0915  12/21/10   0931  09/02/10   0929  07/14/10   0742   BKSPEC  Plasma, EDTA  anticoagulant  Plasma, EDTA anticoagulant CORRECTED ON 12/23 AT 1135: PREVIOUSLY REPORTED AS EDTA  Plasma, EDTA anticoagulant CORRECTED ON 09/07 AT 1402: PREVIOUSLY REPORTED AS edta  Plasma, EDTA anticoagulant   BKRES  <1000  <1000  <1000  <1000       Hepatitis B Testing     Recent Labs   Lab Test  08/01/13   1142  12/21/10   0931  06/22/10   0737   HBSAB   --   0.6  6.1   HBCAB   --   Negative   --    HEPBANG  Negative   --    --         Hepatitis C Antibody   Date Value Ref Range Status   12/21/2010 Negative NEG Final   06/22/2010 Negative NEG Final       CMV IgG Antibody   Date Value Ref Range Status   06/22/2010 110.1 EU/mL Final     Comment:     Positive for anti-CMV IgG     CMV IgM Antibody   Date Value Ref Range Status   02/16/2013 <8.00  No detectable antibody. AU/mL Final     EBV IgG Antibody Interpretation   Date Value Ref Range Status   06/22/2010 Positive, suggests immunologic exposure.  Final   11/18/2008 Positive, suggests immunologic exposure.  Final       Pathology:  2/2/2017 Cervical, endocervical. Pap imaged thin layer prep screening (Surepath, FocalPoint with guided screening). Satisfactory for evaluation. Transformation zone component absent. CYTOLOGIC INTERPRETATION: Negative for Intraepithelial Lesion or Malignancy     Imaging:   EXAMINATION: CT ABDOMEN PELVIS W/O CONTRAST, 3/21/2017 12:57 AM  COMPARISON: CT , 9/27/2015, ultrasound 2/4/2017 3/21/2016    FINDINGS:  Abdomen and pelvis: Atrophic native kidneys.  Right lower quadrant transplant kidney. New mild peritransplant  stranding. Persistent mild transplant hydronephrosis. Increased mild  to moderate hydroureter. Mild irregularity and thickening of the urinary bladder wall.   No evidence of bowel obstruction or abnormal bowel thickening. The  appendix is not clearly visualized but there is no CT evidence of  appendicitis. Unremarkable pelvic organs. Small subcentimeter  mesenteric lymph nodes, likely reactive.  Small fat-containing ventral  hernia.      Impression    IMPRESSION:  1. New mild peritransplant soft tissue stranding with persistent mild  hydronephrosis but increased mild-to-moderate hydroureter. Findings  may be suggestive of transplant infection.  2. Mild irregularity and thickening of the urinary bladder wall.  Findings can be suggestive of cystitis.  3. No transplant or ureteral stone.       Again, thank you for allowing me to participate in the care of your patient.      Sincerely,    Tracy Loving MD

## 2017-05-03 NOTE — NURSING NOTE
The following medication was given:     MEDICATION: Pneumococcal vaccine 23   ROUTE: IM  SITE: Deltoid - Right  DOSE: 0.5 mL  LOT #: [id number]  :  [employer], [employer]  EXPIRATION DATE:  10/20/2018  NDC#: 6784-5399-40  Pt tolerated injection well.  Jo Goldberg., CMA

## 2017-05-03 NOTE — TELEPHONE ENCOUNTER
Please call patient and advise her that her renal US was normal.  She should follow up with Dr. Chong and her infectious disease doctor for further management

## 2017-05-03 NOTE — MR AVS SNAPSHOT
After Visit Summary   5/3/2017    Nena Underwood    MRN: [id number]           Patient Information     Date Of Birth          1975        Visit Information        Provider Department      5/3/2017 8:00 AM Tracy Loving MD [facility]        Today's Diagnoses     Klebsiella pneumoniae infection    -  1    History of recurrent urinary tract infection        Kidney transplanted        Immunosuppressed status (H)        Need for pneumococcal vaccination           Follow-ups after your visit        Your next 10 appointments already scheduled     May 03, 2017  9:00 AM CDT   US RENAL COMPLETE WITH DUPLEX COMPLETE with [id number]   [facility]  ([facility])    [address]  1st Floor  [facility] [address] [phone]           Please bring a list of your medicines (including vitamins, minerals and over-the-counter drugs). Also, tell your doctor about any allergies you may have. Wear comfortable clothes and leave your valuables at home.  Adults: No eating or drinking for 8 hours before the exam. You may take medicine with a small sip of water.  Children: - Children 6+ years: No food or drink for 6 hours before exam. - Children 1-5 years: No food or drink for 4 hours before exam. - Infants, breast-fed: may have breast milk up to 2 hours before exam. - Infants, formula: may have bottle until 4 hours before exam.  Please call the Imaging Department at your exam site with any questions.            May 11, 2017  6:20 PM CDT   Office Visit with Jorge L Tukcer MD   [facility] ([address] [address] [phone]           Bring a current list of meds and any records pertaining to this visit.  For Physicals, please bring immunization records and any forms needing to be filled out.  Please arrive 10 minutes early to complete paperwork.             Jun 14, 2017  7:00 AM CDT   LAB with  LAB   [facility] ([facility])    [address]  [address] [facility] [id number] [phone]           Patient must bring picture ID.  Patient should be prepared to give a urine specimen  Please do not eat 10-12 hours before your appointment if you are coming in fasting for labs on lipids, cholesterol, or glucose (sugar).  Pregnant women should follow their Care Team instructions. Water with medications is okay. Do not drink coffee or other fluids.   If you have concerns about taking  your medications, please ask at office or if scheduling via [employer], send a message by clicking on Secure Messaging, Message Your Care Team.            Jun 14, 2017  8:00 AM CDT   NM CYSTOGRAM VOIDING with UUNM2   [facility] [place], Nuclear Medicine ([facility])    [address] [id number] [phone]           Please bring a list of your medicines to the exam. (Include vitamins, minerals and over-the-counter drugs.) You should wear comfortable clothes. Leave your valuables at home. Please bring related prior results and films.  Tell your doctor:   If you are breastfeeding or may be pregnant.   If you have had a barium test within the past few days. Barium may change the results of certain exams.   If you think you may need sedation (medicine to help you relax).  You may eat and drink as normal.  Please call your Imaging Department at your exam site with any questions.            Jun 20, 2017  3:40 PM CDT   (Arrive by 3:25 PM)   Return Kidney Transplant with Walter Chong MD   [facility] ([facility])    [address]  [address] [facility] [id number] [phone]              Future tests that were ordered for you today     Open Future Orders        Priority Expected Expires Ordered    NM Cystogram Voiding Routine 6/14/2017  5/3/2018 5/3/2017    T cell subset profile Routine 6/14/2017 5/3/2018 5/3/2017    Erythrocyte sedimentation rate auto Routine 6/14/2017 5/3/2018 5/3/2017    CRP inflammation Routine 6/14/2017 5/3/2018 5/3/2017    Renal panel Routine 6/14/2017 5/3/2018 5/3/2017    CBC with platelets differential Routine 6/14/2017 5/3/2018 5/3/2017    Routine UA with micro - Reflex to culture Routine 6/14/2017 5/3/2018 5/3/2017    CMV DNA quantification Routine 6/14/2017 5/3/2018 5/3/2017    EBV DNA PCR Quantitative Whole Blood Routine 6/14/2017 5/3/2018 5/3/2017    BK virus PCR quantitative Routine 6/14/2017 5/3/2018 5/3/2017            Who to contact     If you have questions or need follow up information about today's clinic visit or your schedule please contact [facility] INF[facility] directly at [phone].  Normal or non-critical lab and imaging results will be communicated to you by [employer], letter or phone within 4 business days after the clinic has received the results. If you do not hear from us within 7 days, please contact the clinic through [employer] or phone. If you have a critical or abnormal lab result, we will notify you by phone as soon as possible.  Submit refill requests through [facility] or call your pharmacy and they will forward the refill request to us. Please allow 3 business days for your refill to be completed.          Additional Information About Your Visit        [employer] Information     [facility] gives you secure access to your electronic health record. If you see a primary care provider, you can also send messages to your care team and make appointments. If you have questions, please call your primary care clinic.  If you do not have a primary care provider, please call [phone] and they will assist you.        Care EveryWhere ID     This is your Care EveryWhere ID. This could be used by other organizations to access your [place] medical records  [id number]        Your Vitals  Were     Pulse Temperature Respirations BMI (Body Mass Index)          75 97.4  F (36.3  C) (Oral) 18 17.74 kg/m2         Blood Pressure from Last 3 Encounters:   05/03/17 112/74   04/27/17 96/62   03/30/17 92/56    Weight from Last 3 Encounters:   05/03/17 41.2 kg (90 lb 13.3 oz)   04/27/17 40.8 kg (90 lb)   03/30/17 40.8 kg (90 lb)              We Performed the Following     INFECTIOUS DISEASE REFERRAL        Primary Care Provider Office Phone # Fax #    Jorge L Tucker [phone]       [address] [address]        Thank you!     Thank you for choosing [facility] INF[facility]  for your care. Our goal is always to provide you with excellent care. Hearing back from our patients is one way we can continue to improve our services. Please take a few minutes to complete the written survey that you may receive in the mail after your visit with us. Thank you!             Your Updated Medication List - Protect others around you: Learn how to safely use, store and throw away your medicines at www.disposemymeds.org.          This list is accurate as of: 5/3/17  8:49 AM.  Always use your most recent med list.                   Brand Name Dispense Instructions for use    azaTHIOprine 50 MG tablet    IMURAN    180 tablet    Take 2 tablets (100 mg) by mouth daily       predniSONE 5 MG tablet    DELTASONE    90 tablet    TAKE ONE TABLET BY MOUTH EVERY DAY       sulfamethoxazole-trimethoprim 400-80 MG per tablet    BACTRIM/SEPTRA    90 tablet    Take 1 tablet by mouth daily       tacrolimus capsule     60 capsule    Take 1 capsule (0.5 mg) by mouth 2 times daily       TYLENOL PO      Take 500 mg by mouth every 4 hours as needed for mild pain or fever

## 2017-05-03 NOTE — PROGRESS NOTES
[facility]  Transplant Infectious Disease Clinic Note     Patient:  Nena Underwood, Date of birth 1975, Medical record number [id number]  Date of Visit:  05/03/2017         Assessment and Recommendations:   Recommendations:  - UA with micro and reflex to culture today, to check to see if any of the Klebsiella infection is still present.   - Bloodwork for ~ 6/14/2017 to include fasting transplant labs, but also a check of CD4 count, inflammatory markers, and transplant virus levels.  - Await results of renal ultrasound being done later today.  - Based on the results of the ultrasound, we may order a VCUG, to see if there is urine reflux from the bladder into the transplant ureter.   - Keep appt with  on 6/20/2017.  - Will administer pnumococcal 23 today.   - Return to ID clinic still to be determined if it is needed. However, even if there is no immediate f/u for the current problem of UTIs, if new questions or issues arise, Nena can always be scheduled in for a followup visit at any time.     Assessment:  Nena is a 42 year old woman s/p LDKT in 2010.  Infectious Disease issues include:  - Recurrent UTIs. For the most part due to colon organisms. She has risk factors for UTI with IUDs (2 in 5 years) and variable episodes of loose stool while working in a . UA with micro and reflex to culture today, to check to see if any of the Klebsiella infection is still present. However, her transplant ureter remains dilated, so important to check for reflux. Based on the results of a renal ultrasound scheduled for later today, we will probably order a VCUG, to see if there is urine reflux from the bladder into the transplant ureter. This test will be done prior to appt with Dr. Chong on 6/20/2017. Bloodwork for ~ 6/14/2017 to include a check of inflammatory markers.  - Hx Influenza H1N1 infection 3/29/2016, treated with 5 days of tamiflu.  - Hx of Moraxella (Branhamella)  catarrhalis & Haemophilus bacteria in 3/28/2016 sputum culture.  - Pneumocystis prophylaxis: Bactrim. Will check CD4 count with ~ 6/14/2017 bloodwork to see if bactrim can be d/c.   - Viral serostatus: CMV+, EBV+, VZV+  - Immunization status: due for pnumococcal 23   - Gamma globulin status: 1150 in 2011, no hx of low IgG levels  - Isolation status:  Good hand hygiene.    Tracy Loving MD. Pager [phone]         History of the Infectious Disease lllness:   Nena is a 42 year old female from the [place], immigrated in 2007, born w/ small kidneys, dialysis in June 2008 s/p LDKT 6/23/10. 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 Post-transplant course notable for recurrent UTIs. 3 episodes, q2 weeks to 1 month apart, in July of 2011. Cystoscopy at that time showed a normal urethra, bladder w/ 1 trabeculation. She took low dose keflex for 6 months w/out recurrence of infection. Of note, she did not have frequent UTIs pre-transplant and does note them post copulation. Paragard IUD was removed 8/21/2012, with development of a UTI. She was seen in the infusion center for IV ampicillin in 10/2012, followed by ciprofloxacin, for an enterococcal UTI. 10/8/2012 CT showed the transplant kidney in the right lower quadrant with areas of segmental decreased perfusion in the cortex worrisome for pyelonephritis. Paragrad IUD was reinserted 4/3/2014, and she did ok for a while. In 2015 she developed pneumonia. In 2016 she had frequent Klebsiella UTIs. She had some discomfort with the IUD being in place, and she was wondering if the IUD was contributing to the frequent low grade UTIs in 2015 & 2016. When Paragard IUD was last removed 3/13/2017, she developed one last pyelonephritis episode on 3/20/2017 which was then treated, and the UTIs stopped again. She works in a type of , the [employer] in [place], and she is currently doing the  infant level, so she is frequently exposed to organisms that cause childhood respiratory infections and childhood diarrhea. Her 3-year old son Kentrell is also in class at the same . She has loose BMs from time to time.   Transplants:  2010 (Kidney); Postoperative day:  2506.  Coordinator Griselda Mccormick    Review of Systems:  CONSTITUTIONAL:  No fevers or chills. No night sweats.  EYES: negative for icterus, no current vision issues.   ENT:  negative for hearing loss, tinnitus or sore throat  RESPIRATORY:  negative for cough, sputum, dyspnea  CARDIOVASCULAR:  negative for chest pain, palpitations  GASTROINTESTINAL:  negative for nausea, vomiting, but she has loose bowel movements from time to time (including x the last 3 weeks).   GENITOURINARY:  negative for dysuria, frequency, urgency  HEME:  She has some easy bruising  INTEGUMENT:  negative for rash or pruritus  NEURO:  Negative for headache    Past Medical History:   Diagnosis Date     Anemia      High risk medication use      Hyperparathyroidism, secondary renal (H)      Immunosuppressed status (H)      Kidney replaced by transplant 6/23/10    KIDNEY TRANSPLANT STATUS     Moraxella catarrhalis pneumonia (HCC) 3/28/2016     Pneumonia 2013    requiring hospitalization     Renal aplasia     since birth     Single seizure (H)     felt secondary to uremia     TB (pulmonary tuberculosis)     treated with 4 drug regimen (INH, rifampin, ethambutol and pyrazinamide) for 6 months       Past Surgical History:   Procedure Laterality Date      SECTION  2014    Procedure:  SECTION;;  Surgeon: Griselda Becerra MD;  Location: [facility]     HEMODIALYSIS VIA AV FISTULA      left arm     INSERT INTRAUTERINE DEVICE      Paraguard. removed      TRANSPLANT KIDNEY RECIPIENT LIVING UNRELATED  6/23/10       Family History   Problem Relation Age of Onset     Respiratory Paternal Grandmother      PGM had pulmonary TB at age 85, then   of old age at 89; she lived with [facility]     Eye Disorder Mother      retinal problems       Social History     Social History Narrative    Ms. Underwood emigrated from the [facility] in May, [place]. She is currently living with her  in [place], [place].  She works for the [employer] in [place] (a ).      Social History   Substance Use Topics     Smoking status: Never Smoker     Smokeless tobacco: Never Used     Alcohol use No       Immunization History   Administered Date(s) Administered     Hepatitis B 07/22/2008, 08/23/2008, 10/03/2008     Influenza (IIV3) 11/05/2008, 12/19/2011, 12/18/2012, 10/17/2013     Influenza Vaccine IM 3yrs+ 4 Valent IIV4 02/02/2017     Mantoux 06/28/2008     TDAP Vaccine (Adacel) 03/13/2013       Patient Active Problem List   Diagnosis     Kidney replaced by transplant     CARDIOVASCULAR SCREENING; LDL GOAL LESS THAN 100     Immunosuppressed status (H)     Moraxella catarrhalis pneumonia (H)     Pyelonephritis     Cervical cancer screening     Pyelonephritis, acute     Klebsiella pneumoniae infection     History of recurrent urinary tract infection       Current Outpatient Prescriptions   Medication Sig     sulfamethoxazole-trimethoprim (BACTRIM/SEPTRA) 400-80 MG per tablet Take 1 tablet by mouth daily     predniSONE (DELTASONE) 5 MG tablet TAKE ONE TABLET BY MOUTH EVERY DAY     PROGRAF 0.5 MG PO CAPSULE Take 1 capsule (0.5 mg) by mouth 2 times daily     azaTHIOprine (IMURAN) 50 MG tablet Take 2 tablets (100 mg) by mouth daily     Acetaminophen (TYLENOL PO) Take 500 mg by mouth every 4 hours as needed for mild pain or fever     No current facility-administered medications for this visit.        Allergies   Allergen Reactions     Nkda [No Known Drug Allergies]             Physical Exam:   Vitals were reviewed.  All vitals stable  /74 (Cuff Size: Adult Small)  Pulse 75  Temp 97.4  F (36.3  C) (Oral)  Resp 18  Wt 41.2 kg (90 lb 13.3 oz)  BMI 17.74  kg/m2    Exam:  GENERAL:  well-developed, well-nourished young woman, alert, oriented, in no acute distress.  HEENT:  Head is normocephalic, atraumatic   EYES:  Eyes have anicteric sclerae.    ENT:  Oropharynx is moist without exudates or ulcers.  NECK:  Supple.  LUNGS:  Clear to auscultation.  CARDIOVASCULAR:  Regular rate and rhythm with no murmur. No ankle edema.  ABDOMEN:  Normal bowel sounds, soft, nontender over transplanted kidney in RLQ.  SKIN:  No acute rashes.  Left forearm dialysis fistula with no bruit.   NEUROLOGIC:  Grossly nonfocal.         Laboratory Data:     Absolute CD4   Date Value Ref Range Status   12/28/2016 157 (L) 441 - 2156 cells/uL Final   09/08/2016 143 (L) 441 - 2156 cells/uL Final   03/29/2016 47 (L) 441 - 2156 cells/uL Final   08/09/2011 222 mm3 Final       Inflammatory Markers    Recent Labs   Lab Test  09/21/11   1030   SED  16   CRP  <5.0       Immune Globulin Studies     Recent Labs   Lab Test  09/21/11   1030  08/09/11   0909   IGG   --   1150   IGE  6   --        Metabolic Studies    Recent Labs   Lab Test  04/08/17   1036  03/30/17   1816  03/23/17   0722   03/22/17   0159   03/07/15   0551   01/20/14   0903   02/15/13   2220   NA  140  139  143   < >   --    < >  140   < >  140   < >  139   POTASSIUM  4.0  4.0  3.3*   < >   --    < >  4.8   < >  4.2   < >  3.3*   CHLORIDE  107  105  111*   < >   --    < >  109   < >  108   < >  102   CO2  25  24  24   < >   --    < >  22   < >  16*   < >  25   ANIONGAP  8  10  7   < >   --    < >  8   < >  15.6   < >  12   BUN  18  22  17   < >   --    < >  12   < >  24   < >  22   CR  1.13*  1.32*  1.16*   < >   --    < >  0.90   < >  1.30*   < >  1.00   GFRESTIMATED  52*  44*  51*   < >   --    < >  69   < >  45*   < >  61   GLC  80  71  74   < >   --    < >  89   < >  203*   < >  94   KARINA  8.9  9.1  7.9*   < >   --    < >  8.9   < >  8.8   < >  9.8   PHOS   --    --    --    --    --    --   2.7   < >   --    --    --    MAG   --    --    --     --    --    --   1.6   < >   --    --    --    URIC   --    --    --    --    --    --    --    --   5.4   --    --    RANDY   --    --    --    --    --    --    --    --    --    --   21.2   LACT   --    --    --    --   0.6*   < >   --    < >   --    --   1.2    < > = values in this interval not displayed.       Hepatic Studies    Recent Labs   Lab Test  03/30/17   1816  03/20/17   2136  02/04/17   1144  03/21/16 2010 02/06/14   0814   BILITOTAL  0.3  0.9  0.5  0.5   < >  1.3   BILIDELTA   --    --    --    --    --   0.9*   BILICONJ   --    --    --    --    --   0.0   DBIL   --    --    --   0.1   --    --    ALKPHOS  89  81  98  99   < >  171*   PROTTOTAL  7.7  7.4  7.8  8.0   < >  7.7   ALBUMIN  3.4  3.4  3.2*  3.5   < >  3.7*   AST  30  38  38  37   < >  99*   ALT  22  36  33  35   < >  113*    < > = values in this interval not displayed.       Pancreatitis testing    Recent Labs   Lab Test  02/14/17   0850  10/02/15   2040  09/27/15   1200  09/25/15   1330   12/21/10   0931   LIPASE   --   208  210  254   < >   --    TRIG  74   --    --    --    --   80    < > = values in this interval not displayed.       Gout Labs      Recent Labs   Lab Test  01/20/14   0903   URIC  5.4       Recent Labs   Lab Test  02/14/17   0850  12/21/10   0931  06/22/10   0737   CHOL  118  137  144   HDL  53  43*  45*   LDL  50  78  80   TRIG  74  80  98   CHOLHDLRATIO   --   3.2  3.2       Hematology Studies     Recent Labs   Lab Test  04/08/17   1036  03/30/17   1816  03/23/17   0722  03/22/17   0933   03/20/17   2136   WBC  4.1  3.3*  5.4  10.4   < >  6.5   ANEU   --   2.1   --    --    --   5.4   ALYM   --   0.6*   --    --    --   0.7*   DAYSI   --   0.6   --    --    --   0.4   AEOS   --   0.0   --    --    --   0.0   HGB  11.8  11.2*  8.6*  9.1*   < >  11.0*   HCT  35.0  33.0*  24.9*  26.9*   < >  31.3*   PLT  352  379  148*  190   < >  233    < > = values in this interval not displayed.       Clotting Studies    Recent  Labs   Lab Test  03/06/15   0538  02/06/14   0814  02/15/13   2220  07/14/10   0742   INR  1.35*  0.95  1.03  0.95   PTT  40*  35   --    --        Urine Studies     Recent Labs   Lab Test  03/30/17   1908  03/20/17   2138  03/20/17   1810  03/13/17   1157  03/02/17   0927   URINEPH  5.0  5.0  5.5  5.0  5.0   NITRITE  Negative  Negative  Positive*  Negative  Positive*   LEUKEST  Negative  Large*  Moderate*  Negative  Small*   WBCU  O - 2  >182*  10-25*  O - 2  10-25*       Medication levels    Recent Labs   Lab Test  03/23/17   0722   08/03/12   0540   07/16/12   0807   07/16/10   0900   VANCOMYCIN   --    --   7.4   --    --    --    --    CYCLSP   --    --    --    --    --    --   164   TACROL  6.9   < >   --    < >  3.0*   < >   --    MPACID   --    --    --    --   <0.25*   < >  2.52   MPAG   --    --    --    --   <6.5*   < >  122.5*    < > = values in this interval not displayed.       Microbiology:  Last 6 Culture results with specimen source  Culture Micro   Date Value Ref Range Status   03/21/2017 No growth  Final   03/21/2017 No growth  Final   03/20/2017 (A)  Final    50,000 to 100,000 colonies/mL Klebsiella pneumoniae   03/20/2017 (A)  Final    50,000 to 100,000 colonies/mL Klebsiella pneumoniae   03/13/2017 No growth  Final   03/02/2017 10,000 to 50,000 colonies/mL Klebsiella pneumoniae (A)  Final    Specimen Description   Date Value Ref Range Status   03/23/2017 Feces  Final   03/21/2017 Blood Right Hand  Final   03/21/2017 Blood Right Arm  Final   03/20/2017 Midstream Urine  Final   03/20/2017 Midstream Urine  Final   03/13/2017 Urine  Final        Last check of C difficile  C Diff Toxin B PCR   Date Value Ref Range Status   03/23/2017  NEG Final    Negative  Negative: Clostridium difficile target DNA sequences NOT detected, presumed   negative for Clostridium difficile toxin B or the number of bacteria present   may be below the limit of detection for the test.   FDA approved assay performed using  [employer] GeneXpert real-time PCR.   A negative result does not exclude actual disease due to Clostridium difficile   and may be due to improper collection, handling and storage of the specimen or   the number of organisms in the specimen is below the detection limit of the   assay.         Virology:  Influenza virus testing    Recent Labs   Lab Test  03/29/16   1700  03/28/16   1618   ISPEC   --   Nasopharyngeal   RVSPEC  Nasopharyngeal   --    AFLU   --   Negative   BFLU   --   Negative   Test results must be correlated with clinical data. If necessary, results   should be confirmed by a molecular assay or viral culture.     IFLUA  Positive   Critical Value/Significant Value called to and read back by  JORGE ROTH RN ON 5B @1438 ON 3/30/16. CMS.  *   --    IFLUB  Negative   --    FLUAH1  Negative   --    FLUAH3  Negative   --    CY1580  Positive   Critical Value/Significant Value called to and read back by  JORGE ROTH RN ON 5B @1438 ON 3/30/16. CMS.  *   --        CMV viral loads    Recent Labs   Lab Test  02/16/13   0030  10/08/12   1205  09/06/12   0940  08/02/12   0740  07/15/11   1113   CSPEC  Whole blood, EDTA anticoagulant  Whole blood, EDTA anticoagulant  Whole blood, EDTA anticoagulant  Whole blood, EDTA anticoagulant CORRECTED ON 08/03 AT 1047: PREVIOUSLY REPORTED AS EDTA BLOOD  Whole Blood CORRECTED ON 07/16 AT 1514: PREVIOUSLY REPORTED AS 2130       EBV DNA Copies/mL   Date Value Ref Range Status   07/15/2011 <1000 <1000 Copies/mL Final       BK viral loads   Recent Labs   Lab Test  11/07/12   0915  12/21/10   0931  09/02/10   0929  07/14/10   0742   BKSPEC  Plasma, EDTA anticoagulant  Plasma, EDTA anticoagulant CORRECTED ON 12/23 AT 1135: PREVIOUSLY REPORTED AS EDTA  Plasma, EDTA anticoagulant CORRECTED ON 09/07 AT 1402: PREVIOUSLY REPORTED AS edta  Plasma, EDTA anticoagulant   BKRES  <1000  <1000  <1000  <1000       Hepatitis B Testing     Recent Labs   Lab Test  08/01/13   1142  12/21/10    0931  06/22/10   0737   HBSAB   --   0.6  6.1   HBCAB   --   Negative   --    HEPBANG  Negative   --    --         Hepatitis C Antibody   Date Value Ref Range Status   12/21/2010 Negative NEG Final   06/22/2010 Negative NEG Final       CMV IgG Antibody   Date Value Ref Range Status   06/22/2010 110.1 EU/mL Final     Comment:     Positive for anti-CMV IgG     CMV IgM Antibody   Date Value Ref Range Status   02/16/2013 <8.00  No detectable antibody. AU/mL Final     EBV IgG Antibody Interpretation   Date Value Ref Range Status   06/22/2010 Positive, suggests immunologic exposure.  Final   11/18/2008 Positive, suggests immunologic exposure.  Final       Pathology:  2/2/2017 Cervical, endocervical. Pap imaged thin layer prep screening (Surepath, FocalPoint with guided screening). Satisfactory for evaluation. Transformation zone component absent. CYTOLOGIC INTERPRETATION: Negative for Intraepithelial Lesion or Malignancy     Imaging:   EXAMINATION: CT ABDOMEN PELVIS W/O CONTRAST, 3/21/2017 12:57 AM  COMPARISON: CT , 9/27/2015, ultrasound 2/4/2017 3/21/2016    FINDINGS:  Abdomen and pelvis: Atrophic native kidneys.  Right lower quadrant transplant kidney. New mild peritransplant  stranding. Persistent mild transplant hydronephrosis. Increased mild  to moderate hydroureter. Mild irregularity and thickening of the urinary bladder wall.   No evidence of bowel obstruction or abnormal bowel thickening. The  appendix is not clearly visualized but there is no CT evidence of  appendicitis. Unremarkable pelvic organs. Small subcentimeter  mesenteric lymph nodes, likely reactive.  Small fat-containing ventral hernia.      Impression    IMPRESSION:  1. New mild peritransplant soft tissue stranding with persistent mild  hydronephrosis but increased mild-to-moderate hydroureter. Findings  may be suggestive of transplant infection.  2. Mild irregularity and thickening of the urinary bladder wall.  Findings can be suggestive of  cystitis.  3. No transplant or ureteral stone.

## 2017-05-03 NOTE — TELEPHONE ENCOUNTER
Left non detailed message for patient to return call.  Ashley Mario RN   [facility] - Triage

## 2017-05-04 NOTE — TELEPHONE ENCOUNTER
Left message on answering machine for patient to call back.  Maryjo McgrawRN  [facility]  [phone]

## 2017-05-05 NOTE — TELEPHONE ENCOUNTER
Patient notified with information noted below from provider and agrees with plan.  Meilssa Du RN  [facility] [employer]

## 2017-05-11 ENCOUNTER — OFFICE VISIT (OUTPATIENT)
Dept: FAMILY MEDICINE | Facility: CLINIC | Age: 42
End: 2017-05-11
Payer: COMMERCIAL

## 2017-05-11 VITALS
DIASTOLIC BLOOD PRESSURE: 70 MMHG | HEIGHT: 60 IN | WEIGHT: 91 LBS | SYSTOLIC BLOOD PRESSURE: 110 MMHG | BODY MASS INDEX: 17.87 KG/M2 | HEART RATE: 70 BPM | OXYGEN SATURATION: 100 % | TEMPERATURE: 97.5 F

## 2017-05-11 DIAGNOSIS — Z30.017 INSERTION OF IMPLANTABLE SUBDERMAL CONTRACEPTIVE: Primary | ICD-10-CM

## 2017-05-11 DIAGNOSIS — Z01.812 PRE-PROCEDURE LAB EXAM: ICD-10-CM

## 2017-05-11 LAB — BETA HCG QUAL IFA URINE: NEGATIVE

## 2017-05-11 PROCEDURE — 11981 INSERTION DRUG DLVR IMPLANT: CPT | Performed by: FAMILY MEDICINE

## 2017-05-11 PROCEDURE — 84703 CHORIONIC GONADOTROPIN ASSAY: CPT | Performed by: FAMILY MEDICINE

## 2017-05-11 NOTE — NURSING NOTE
Chief Complaint   Patient presents with     Contraception       Initial /70 (BP Location: Left arm, Patient Position: Chair, Cuff Size: Adult Regular)  Pulse 70  Temp 97.5  F (36.4  C) (Tympanic)  Ht 5' (1.524 m)  Wt 91 lb (41.3 kg)  SpO2 100%  BMI 17.77 kg/m2 Estimated body mass index is 17.77 kg/(m^2) as calculated from the following:    Height as of this encounter: 5' (1.524 m).    Weight as of this encounter: 91 lb (41.3 kg).  Medication Reconciliation: complete     Mimi Burrell CMA

## 2017-05-11 NOTE — LETTER
[facility]   [address] [place] [address]   [phone]                May 11, 2017    RE:  Nena Underwood                                                                                                                                                       [address]   [place] [address]            To whom it may concern:    Nena Underwood was seen today in our office and needs to be excused from work on Friday May 11th 2017.    Thanks.    Sincerely,        Jorge L Tucker M.D.

## 2017-05-11 NOTE — PROGRESS NOTES
SUBJECTIVE:                                                    Nena Underwood is a 42 year old female who presents to clinic today for the following health issues:      Nexplanon insertion    Problem list and histories reviewed & adjusted, as indicated.  Additional history: as documented    Patient Active Problem List   Diagnosis     Kidney replaced by transplant     CARDIOVASCULAR SCREENING; LDL GOAL LESS THAN 100     Immunosuppressed status (H)     Moraxella catarrhalis pneumonia (H)     Pyelonephritis     Cervical cancer screening     Pyelonephritis, acute     Klebsiella pneumoniae infection     History of recurrent urinary tract infection     Past Surgical History:   Procedure Laterality Date      SECTION  2014    Procedure:  SECTION;;  Surgeon: Griselda Becerra MD;  Location: [facility] L+D     HEMODIALYSIS VIA AV FISTULA      left arm     INSERT INTRAUTERINE DEVICE      Paraguard. removed      REMOVE INTRAUTERINE DEVICE  2017     TRANSPLANT KIDNEY RECIPIENT LIVING UNRELATED  6/23/10       Social History   Substance Use Topics     Smoking status: Never Smoker     Smokeless tobacco: Never Used     Alcohol use No     Family History   Problem Relation Age of Onset     Respiratory Paternal Grandmother      PGM had pulmonary TB at age 85, then  of old age at 89; she lived with Nena     Eye Disorder Mother      retinal problems         Current Outpatient Prescriptions   Medication Sig Dispense Refill     etonogestrel (IMPLANON/NEXPLANON) 68 MG IMPL 1 each (68 mg) by Subdermal route once for 1 dose 1 each 0     sulfamethoxazole-trimethoprim (BACTRIM/SEPTRA) 400-80 MG per tablet Take 1 tablet by mouth daily 90 tablet 3     predniSONE (DELTASONE) 5 MG tablet TAKE ONE TABLET BY MOUTH EVERY DAY 90 tablet 3     PROGRAF 0.5 MG PO CAPSULE Take 1 capsule (0.5 mg) by mouth 2 times daily 60 capsule 11     azaTHIOprine (IMURAN) 50 MG tablet Take 2 tablets (100 mg) by mouth daily 180  tablet 3     Acetaminophen (TYLENOL PO) Take 500 mg by mouth every 4 hours as needed for mild pain or fever       Allergies   Allergen Reactions     Nkda [No Known Drug Allergies]      Recent Labs   Lab Test  04/08/17   1036  03/30/17   1816   03/20/17   2136   02/14/17   0850   02/04/17   1144   12/21/10   0931   06/22/10   0737   A1C   --    --    --    --    --    --    --    --    --    --    --   4.6   LDL   --    --    --    --    --   50   --    --    --   78   --   80   HDL   --    --    --    --    --   53   --    --    --   43*   --   45*   TRIG   --    --    --    --    --   74   --    --    --   80   --   98   ALT   --   22   --   36   --    --    --   33   < >  19   < >  40   CR  1.13*  1.32*   < >  1.22*   < >   --    < >  0.94   < >  0.87   < >  10.54*   GFRESTIMATED  52*  44*   < >  48*   < >   --    < >  65   < >  73   < >  4*   GFRESTBLACK  63  53*   < >  58*   < >   --    < >  79   < >  89   < >  5*   POTASSIUM  4.0  4.0   < >  3.8   < >   --    < >  3.2*   < >  4.3   < >  4.3    < > = values in this interval not displayed.      BP Readings from Last 3 Encounters:   05/11/17 110/70   05/03/17 112/74   04/27/17 96/62    Wt Readings from Last 3 Encounters:   05/11/17 91 lb (41.3 kg)   05/03/17 90 lb 13.3 oz (41.2 kg)   04/27/17 90 lb (40.8 kg)                    Reviewed and updated as needed this visit by clinical staff  Tobacco  Allergies  Meds       Reviewed and updated as needed this visit by Provider         ROS:  Constitutional, HEENT, cardiovascular, pulmonary, gi and gu systems are negative, except as otherwise noted.    OBJECTIVE:                                                    /70 (BP Location: Left arm, Patient Position: Chair, Cuff Size: Adult Regular)  Pulse 70  Temp 97.5  F (36.4  C) (Tympanic)  Ht 5' (1.524 m)  Wt 91 lb (41.3 kg)  SpO2 100%  BMI 17.77 kg/m2  Body mass index is 17.77 kg/(m^2).  GENERAL: healthy, alert and no distress  NECK: no adenopathy, no  asymmetry, masses, or scars and thyroid normal to palpation  RESP: lungs clear to auscultation - no rales, rhonchi or wheezes  CV: regular rate and rhythm, normal S1 S2, no S3 or S4, no murmur, click or rub, no peripheral edema and peripheral pulses strong  ABDOMEN: soft, nontender, no hepatosplenomegaly, no masses and bowel sounds normal  MS: no gross musculoskeletal defects noted, no edema         ASSESSMENT/PLAN:                                                    Nena was seen today for contraception.    Diagnoses and all orders for this visit:    Insertion of implantable subdermal contraceptive  -     etonogestrel (IMPLANON/NEXPLANON) 68 MG IMPL; 1 each (68 mg) by Subdermal route once for 1 dose  -     ETONOGESTREL IMPLANT SYSTEM  -     INSERTION NON-BIODEGRADABLE DRUG DELIVERY IMPLANT    Pre-procedure lab exam  -     Beta HCG qual IFA urine      Nexplanon insertion procedure note    After having written consent, have the patient lie on her back on the examination table with her right arm flexed at the elbow and externally rotated, a site for insertion was marked 6-8 cm above the elbow crease on the arm between bicesp and triceps, a second qi was made on the same arm 6-8 cm proximal to the first qi, insertion site was anesthetized with 2 mL of 1% lidocaine without epinephrine,  device was removed from the packaging, white zoltan inside the needle tip was identified , countertraction to the skin applied and inserted the tip of the needle bevel up into the skin with less than 20 degrees insertion angle, put the applicator in a position horizontal to the skin and lift the skin up with the tip of the needle keeping the needle subdermal, tenting the skin and inserting the needle fully into the subdermal tissue, aiming for the second qi, break the applicator seal by pressing the support for the obturator, then turn the obturator 90 degrees clock wise direction, holding the obturator in place on the arm  and retract the cannula, slide the cannula off the device, placement if the device was confirmed with direct palpation, the wound was dressed with adhesive bandage and pressure dressing, her reminder card was given with details of the device implanted       Jorge L Tucker MD  [facility]

## 2017-05-11 NOTE — MR AVS SNAPSHOT
After Visit Summary   5/11/2017    Nena Underwood    MRN: [id number]           Patient Information     Date Of Birth          1975        Visit Information        Provider Department      5/11/2017 6:20 PM Jorge L Tucker MD [facility] [place]        Today's Diagnoses     Insertion of implantable subdermal contraceptive    -  1    Pre-procedure lab exam           Follow-ups after your visit        Your next 10 appointments already scheduled     Jun 14, 2017  7:00 AM CDT   LAB with  LAB   [facility] ([facility])    [address] [address] [phone]           Patient must bring picture ID.  Patient should be prepared to give a urine specimen  Please do not eat 10-12 hours before your appointment if you are coming in fasting for labs on lipids, cholesterol, or glucose (sugar).  Pregnant women should follow their Care Team instructions. Water with medications is okay. Do not drink coffee or other fluids.   If you have concerns about taking  your medications, please ask at office or if scheduling via [employer] [facility], send a message by clicking on Secure Messaging, Message Your Care Team.            Jun 14, 2017  8:00 AM CDT   NM CYSTOGRAM VOIDING with UUNM2   [facility] [place], Nuclear Medicine ([facility])    [address] [facility] [address] [phone]           Please bring a list of your medicines to the exam. (Include vitamins, minerals and over-the-counter drugs.) You should wear comfortable clothes. Leave your valuables at home. Please bring related prior results and films.  Tell your doctor:   If you are breastfeeding or may be pregnant.   If you have had a barium test within the past few days. Barium may change the results of certain exams.   If you think you may need sedation (medicine to help you relax).  You may eat and drink as normal.  Please call your Imaging  Department at your exam site with any questions.            Jun 20, 2017  3:40 PM CDT   (Arrive by 3:25 PM)   Return Kidney Transplant with Walter Chong MD   [facility] ([facility])    [address] [place] [address]  [address] [facility] [address] [phone]              Who to contact     If you have questions or need follow up information about today's clinic visit or your schedule please contact [facility] [place] directly at [phone].  Normal or non-critical lab and imaging results will be communicated to you by [employer], letter or phone within 4 business days after the clinic has received the results. If you do not hear from us within 7 days, please contact the clinic through [employer] or phone. If you have a critical or abnormal lab result, we will notify you by phone as soon as possible.  Submit refill requests through [employer] or call your pharmacy and they will forward the refill request to us. Please allow 3 business days for your refill to be completed.          Additional Information About Your Visit        [employer] Information     [employer] gives you secure access to your electronic health record. If you see a primary care provider, you can also send messages to your care team and make appointments. If you have questions, please call your primary care clinic.  If you do not have a primary care provider, please call [phone] and they will assist you.        Care EveryWhere ID     This is your Care EveryWhere ID. This could be used by other organizations to access your [place] medical records  [id number]        Your Vitals Were     Pulse Temperature Height Pulse Oximetry BMI (Body Mass Index)       70 97.5  F (36.4  C) (Tympanic) 5' (1.524 m) 100% 17.77 kg/m2        Blood Pressure from Last 3 Encounters:   05/11/17 110/70   05/03/17 112/74   04/27/17 96/62    Weight from Last 3 Encounters:   05/11/17 91 lb (41.3 kg)   05/03/17 90 lb 13.3 oz (41.2  kg)   04/27/17 90 lb (40.8 kg)              We Performed the Following     Beta HCG qual IFA urine     ETONOGESTREL IMPLANT SYSTEM     INSERTION NON-BIODEGRADABLE DRUG DELIVERY IMPLANT          Today's Medication Changes          These changes are accurate as of: 5/11/17  6:55 PM.  If you have any questions, ask your nurse or doctor.               Start taking these medicines.        Dose/Directions    etonogestrel 68 MG Impl   Commonly known as:  IMPLANON/NEXPLANON   Used for:  Insertion of implantable subdermal contraceptive   Started by:  Jorge L Tucker MD        Dose:  1 each   1 each (68 mg) by Subdermal route once for 1 dose   Quantity:  1 each   Refills:  0            Where to get your medicines      Some of these will need a paper prescription and others can be bought over the counter.  Ask your nurse if you have questions.     You don't need a prescription for these medications     etonogestrel 68 MG Impl                Primary Care Provider Office Phone # Fax #    Jorge L Tucker [phone]       [address] [address]        Thank you!     Thank you for choosing [facility]  for your care. Our goal is always to provide you with excellent care. Hearing back from our patients is one way we can continue to improve our services. Please take a few minutes to complete the written survey that you may receive in the mail after your visit with us. Thank you!             Your Updated Medication List - Protect others around you: Learn how to safely use, store and throw away your medicines at www.disposemymeds.org.          This list is accurate as of: 5/11/17  6:55 PM.  Always use your most recent med list.                   Brand Name Dispense Instructions for use    azaTHIOprine 50 MG tablet    IMURAN    180 tablet    Take 2 tablets (100 mg) by mouth daily       etonogestrel 68 MG Impl    IMPLANON/NEXPLANON    1 each    1 each (68 mg) by  Subdermal route once for 1 dose       predniSONE 5 MG tablet    DELTASONE    90 tablet    TAKE ONE TABLET BY MOUTH EVERY DAY       sulfamethoxazole-trimethoprim 400-80 MG per tablet    BACTRIM/SEPTRA    90 tablet    Take 1 tablet by mouth daily       tacrolimus capsule     60 capsule    Take 1 capsule (0.5 mg) by mouth 2 times daily       TYLENOL PO      Take 500 mg by mouth every 4 hours as needed for mild pain or fever

## 2017-05-24 DIAGNOSIS — Z94.0 KIDNEY TRANSPLANTED: ICD-10-CM

## 2017-05-24 DIAGNOSIS — D84.9 IMMUNOSUPPRESSED STATUS (H): ICD-10-CM

## 2017-05-24 RX ORDER — PREDNISONE 5 MG/1
5 TABLET ORAL DAILY
Qty: 90 TABLET | Refills: 3 | Status: SHIPPED | OUTPATIENT
Start: 2017-05-24 | End: 2019-07-01

## 2017-05-24 RX ORDER — SULFAMETHOXAZOLE AND TRIMETHOPRIM 400; 80 MG/1; MG/1
1 TABLET ORAL DAILY
Qty: 90 TABLET | Refills: 3 | Status: SHIPPED | OUTPATIENT
Start: 2017-05-24 | End: 2018-08-31

## 2017-05-24 RX ORDER — TACROLIMUS 0.5 MG/1
0.5 CAPSULE, GELATIN COATED ORAL 2 TIMES DAILY
Qty: 60 CAPSULE | Refills: 11 | Status: SHIPPED | OUTPATIENT
Start: 2017-05-24 | End: 2017-09-22

## 2017-05-24 NOTE — TELEPHONE ENCOUNTER
F\U call placed to patient: Patient request that SOT medications are sent to [facility]

## 2017-05-24 NOTE — TELEPHONE ENCOUNTER
Patient has question regarding Rx order for medications. Wants order fax to specialty pharmacy [facility] [phone]

## 2017-05-26 DIAGNOSIS — Z94.0 KIDNEY REPLACED BY TRANSPLANT: Primary | ICD-10-CM

## 2017-05-26 RX ORDER — AZATHIOPRINE 50 MG/1
100 TABLET ORAL DAILY
Qty: 180 TABLET | Refills: 3 | Status: SHIPPED | OUTPATIENT
Start: 2017-05-26 | End: 2017-06-20

## 2017-06-14 ENCOUNTER — HOSPITAL ENCOUNTER (OUTPATIENT)
Dept: GENERAL RADIOLOGY | Facility: CLINIC | Age: 42
Discharge: HOME | End: 2017-06-14
Attending: INTERNAL MEDICINE | Admitting: INTERNAL MEDICINE
Payer: OTHER GOVERNMENT

## 2017-06-14 DIAGNOSIS — Z94.0 KIDNEY REPLACED BY TRANSPLANT: ICD-10-CM

## 2017-06-14 DIAGNOSIS — Z48.298 AFTERCARE FOLLOWING ORGAN TRANSPLANT: ICD-10-CM

## 2017-06-14 DIAGNOSIS — Z79.899 ENCOUNTER FOR LONG-TERM CURRENT USE OF MEDICATION: ICD-10-CM

## 2017-06-14 DIAGNOSIS — Z87.440 HISTORY OF RECURRENT URINARY TRACT INFECTION: ICD-10-CM

## 2017-06-14 DIAGNOSIS — Z94.0 KIDNEY TRANSPLANTED: ICD-10-CM

## 2017-06-14 DIAGNOSIS — A49.8 KLEBSIELLA PNEUMONIAE INFECTION: ICD-10-CM

## 2017-06-14 DIAGNOSIS — D84.9 IMMUNOSUPPRESSED STATUS (H): ICD-10-CM

## 2017-06-14 LAB
ALBUMIN SERPL-MCNC: 3.6 G/DL (ref 3.4–5)
ALBUMIN UR-MCNC: NEGATIVE MG/DL
ANION GAP SERPL CALCULATED.3IONS-SCNC: 8 MMOL/L (ref 3–14)
APPEARANCE UR: CLEAR
BACTERIA #/AREA URNS HPF: ABNORMAL /HPF
BASOPHILS # BLD AUTO: 0 10E9/L (ref 0–0.2)
BASOPHILS NFR BLD AUTO: 0.3 %
BILIRUB UR QL STRIP: NEGATIVE
BUN SERPL-MCNC: 24 MG/DL (ref 7–30)
CALCIUM SERPL-MCNC: 9.3 MG/DL (ref 8.5–10.1)
CD3 CELLS # BLD: 619 CELLS/UL (ref 603–2990)
CD3 CELLS NFR BLD: 70 % (ref 49–84)
CD3+CD4+ CELLS # BLD: 304 CELLS/UL (ref 441–2156)
CD3+CD4+ CELLS NFR BLD: 35 % (ref 28–63)
CD3+CD4+ CELLS/CD3+CD8+ CLL BLD: 1.03 % (ref 1.4–2.6)
CD3+CD8+ CELLS # BLD: 303 CELLS/UL (ref 125–1312)
CD3+CD8+ CELLS NFR BLD: 34 % (ref 10–40)
CHLORIDE SERPL-SCNC: 111 MMOL/L (ref 94–109)
CO2 SERPL-SCNC: 21 MMOL/L (ref 20–32)
COLOR UR AUTO: YELLOW
CREAT SERPL-MCNC: 1.04 MG/DL (ref 0.52–1.04)
CRP SERPL-MCNC: <2.9 MG/L (ref 0–8)
DIFFERENTIAL METHOD BLD: ABNORMAL
EOSINOPHIL # BLD AUTO: 0 10E9/L (ref 0–0.7)
EOSINOPHIL NFR BLD AUTO: 0.5 %
ERYTHROCYTE [DISTWIDTH] IN BLOOD BY AUTOMATED COUNT: 13.1 % (ref 10–15)
ERYTHROCYTE [SEDIMENTATION RATE] IN BLOOD BY WESTERGREN METHOD: 123 MM/H (ref 0–20)
GFR SERPL CREATININE-BSD FRML MDRD: 57 ML/MIN/1.7M2
GLUCOSE SERPL-MCNC: 90 MG/DL (ref 70–99)
GLUCOSE UR STRIP-MCNC: NEGATIVE MG/DL
HCT VFR BLD AUTO: 34.4 % (ref 35–47)
HGB BLD-MCNC: 11.9 G/DL (ref 11.7–15.7)
HGB UR QL STRIP: ABNORMAL
IFC SPECIMEN: ABNORMAL
IMM GRANULOCYTES # BLD: 0 10E9/L (ref 0–0.4)
IMM GRANULOCYTES NFR BLD: 0.5 %
IMMUNODEFICIENCY MARKERS SPEC-IMP: ABNORMAL
KETONES UR STRIP-MCNC: NEGATIVE MG/DL
LEUKOCYTE ESTERASE UR QL STRIP: NEGATIVE
LYMPHOCYTES # BLD AUTO: 0.8 10E9/L (ref 0.8–5.3)
LYMPHOCYTES NFR BLD AUTO: 20.2 %
MCH RBC QN AUTO: 36.1 PG (ref 26.5–33)
MCHC RBC AUTO-ENTMCNC: 34.6 G/DL (ref 31.5–36.5)
MCV RBC AUTO: 104 FL (ref 78–100)
MONOCYTES # BLD AUTO: 0.5 10E9/L (ref 0–1.3)
MONOCYTES NFR BLD AUTO: 12.9 %
MUCOUS THREADS #/AREA URNS LPF: PRESENT /LPF
NEUTROPHILS # BLD AUTO: 2.5 10E9/L (ref 1.6–8.3)
NEUTROPHILS NFR BLD AUTO: 65.6 %
NITRATE UR QL: NEGATIVE
NRBC # BLD AUTO: 0 10*3/UL
NRBC BLD AUTO-RTO: 0 /100
PH UR STRIP: 5 PH (ref 5–7)
PHOSPHATE SERPL-MCNC: 3.2 MG/DL (ref 2.5–4.5)
PLATELET # BLD AUTO: 265 10E9/L (ref 150–450)
POTASSIUM SERPL-SCNC: 4 MMOL/L (ref 3.4–5.3)
RBC # BLD AUTO: 3.3 10E12/L (ref 3.8–5.2)
RBC #/AREA URNS AUTO: 3 /HPF (ref 0–2)
SODIUM SERPL-SCNC: 140 MMOL/L (ref 133–144)
SP GR UR STRIP: 1.02 (ref 1–1.03)
SQUAMOUS #/AREA URNS AUTO: 4 /HPF (ref 0–1)
TACROLIMUS BLD-MCNC: 9.7 UG/L (ref 5–15)
TME LAST DOSE: NORMAL H
URN SPEC COLLECT METH UR: ABNORMAL
UROBILINOGEN UR STRIP-MCNC: 0 MG/DL (ref 0–2)
WBC # BLD AUTO: 3.8 10E9/L (ref 4–11)
WBC #/AREA URNS AUTO: 3 /HPF (ref 0–2)

## 2017-06-14 PROCEDURE — 25000125 ZZHC RX 250: Performed by: RADIOLOGY

## 2017-06-14 PROCEDURE — 51600 INJECTION FOR BLADDER X-RAY: CPT

## 2017-06-14 RX ADMIN — LIDOCAINE HYDROCHLORIDE 5 ML: 20 JELLY TOPICAL at 11:33

## 2017-06-15 LAB
CMV DNA SPEC NAA+PROBE-ACNC: NORMAL [IU]/ML
CMV DNA SPEC NAA+PROBE-LOG#: NORMAL {LOG_IU}/ML
SPECIMEN SOURCE: NORMAL

## 2017-06-16 ENCOUNTER — TELEPHONE (OUTPATIENT)
Dept: TRANSPLANT | Facility: CLINIC | Age: 42
End: 2017-06-16

## 2017-06-16 DIAGNOSIS — Z94.0 KIDNEY REPLACED BY TRANSPLANT: Primary | ICD-10-CM

## 2017-06-16 DIAGNOSIS — B27.90 EBV INFECTION: ICD-10-CM

## 2017-06-16 LAB
BKV DNA # SPEC NAA+PROBE: NORMAL COPIES/ML
BKV DNA SPEC NAA+PROBE-LOG#: NORMAL LOG COPIES/ML
EBV DNA # SPEC NAA+PROBE: ABNORMAL {COPIES}/ML
EBV DNA SPEC NAA+PROBE-LOG#: 5 {LOG_COPIES}/ML
SPECIMEN SOURCE: NORMAL

## 2017-06-16 NOTE — TELEPHONE ENCOUNTER
Issue Prograf tacrolimus level 9.7 appears to be not a 12 hour trough level   Recommend to repeat transplant  Labs with Prograf tacrolimus level in the next 2 weeks

## 2017-06-16 NOTE — TELEPHONE ENCOUNTER
Call placed to patient: No answer. Detailed voice message left informing patient to recheck tacrolimus level in 2 weeks ensuring a good lab draw. Order placed

## 2017-06-19 RX ORDER — AZATHIOPRINE 50 MG/1
25 TABLET ORAL DAILY
Qty: 180 TABLET | Refills: 3 | Status: CANCELLED | OUTPATIENT
Start: 2017-06-19

## 2017-06-19 NOTE — TELEPHONE ENCOUNTER
Walter Chong MD Huepfel, Mary K RN EBV 94,2017                     New EBV viremia.  Would recommend CT chest/abd/pelvis to rule out lymphoma.  Also recommend decreasing azathioprine to 25 mg daily.              Walter Chong MD Huepfel, Mary K, RN                     New EBV viremia.  Would recommend CT chest/abd/pelvis to rule out lymphoma.  Also recommend decreasing azathioprine to 25 mg daily.

## 2017-06-19 NOTE — TELEPHONE ENCOUNTER
F\U call placed to patient: No answer. Voice message left requesting patient repeat  Her tacrolimus level, call to schedule a CT Scan and decrease Azathioprine to 25 mg daily. Rx sent

## 2017-06-20 ENCOUNTER — OFFICE VISIT (OUTPATIENT)
Dept: NEPHROLOGY | Facility: CLINIC | Age: 42
End: 2017-06-20
Attending: INTERNAL MEDICINE
Payer: OTHER GOVERNMENT

## 2017-06-20 VITALS
BODY MASS INDEX: 18.42 KG/M2 | DIASTOLIC BLOOD PRESSURE: 65 MMHG | HEIGHT: 60 IN | TEMPERATURE: 98.2 F | HEART RATE: 82 BPM | WEIGHT: 93.8 LBS | OXYGEN SATURATION: 98 % | SYSTOLIC BLOOD PRESSURE: 100 MMHG

## 2017-06-20 DIAGNOSIS — Z94.0 KIDNEY REPLACED BY TRANSPLANT: ICD-10-CM

## 2017-06-20 DIAGNOSIS — D84.9 IMMUNOSUPPRESSED STATUS (H): Primary | ICD-10-CM

## 2017-06-20 DIAGNOSIS — Z48.298 AFTERCARE FOLLOWING ORGAN TRANSPLANT: ICD-10-CM

## 2017-06-20 DIAGNOSIS — B27.00 EBV (EPSTEIN-BARR VIRUS) VIREMIA: ICD-10-CM

## 2017-06-20 PROBLEM — A49.8 KLEBSIELLA PNEUMONIAE INFECTION: Status: RESOLVED | Noted: 2017-05-03 | Resolved: 2017-06-20

## 2017-06-20 PROBLEM — N10 PYELONEPHRITIS, ACUTE: Status: RESOLVED | Noted: 2017-03-21 | Resolved: 2017-06-20

## 2017-06-20 PROCEDURE — 99212 OFFICE O/P EST SF 10 MIN: CPT | Mod: ZF

## 2017-06-20 RX ORDER — AZATHIOPRINE 50 MG/1
50 TABLET ORAL DAILY
Qty: 90 TABLET | Refills: 3 | Status: SHIPPED | OUTPATIENT
Start: 2017-06-20 | End: 2018-06-11

## 2017-06-20 ASSESSMENT — PAIN SCALES - GENERAL: PAINLEVEL: NO PAIN (0)

## 2017-06-20 NOTE — LETTER
6/20/2017      RE: Nena Underwood  [address]   [facility] [address]       Assessment and Plan:  1. LDKT - baseline Cr ~ 0.9-1.0, which has remained stable.  Normal proteinuria.  With EBV viremia, will decrease azathioprine to 50 mg daily.  May need to look at lowering tacrolimus levels next.  Because she is on the lowest pill dosage for tacrolimus, may need to change to liquid dosing or change medication to cyclosporine to allow lower drug levels.  Will make no other changes in immunosuppression.  2. BP - well controlled at target of less than 140/90 off antihypertensive medications.  No changes.  3. Recurrent UTIs - no recent infection.  4. EBV viremia - new moderately elevated EBV viremia, near 100K.  Will decrease immunosuppression and obtain CT chest/abd/pelvis to rule out lymphoma.  5. Recommend return visit in 6 months.    Assessment and plan was discussed with patient and she voiced her understanding and agreement.    Reason for Visit:  Ms. Underwood is here for routine follow up.    HPI:   Nena Underwood is a 42 year old female with ESKD from unknown etiology and is status post LDKT on 6/23/10.         Transplant Hx:       Tx: LDKT  Date: 6/23/10       Present Maintenance IS: Tacrolimus, Azathioprine and Prednisone       Baseline Creatinine: 0.9-1.0    Ms. Underwood reports feeling good overall with minimal medical complaints.  Her energy level has been really good and pretty much normal.  She is active and does get some exercise.  Denies any chest pain or shortness of breath with exertion.  No cough or URI symptoms.  Appetite is good and weight has been stable to up a couple of pounds.  No nausea, vomiting or diarrhea.  No fever, sweats or chills.  Rare, very mild night sweats where she wakes up feeling warm and slight sweating under her arms.  No leg swelling.  No pain or burning with urination.    Home BP: Not checked.      ROS:   A comprehensive review of systems was obtained and negative,  except as noted in the HPI or PMH.    Active Medical Problems:  Patient Active Problem List   Diagnosis     Kidney replaced by transplant     Aftercare following organ transplant     CARDIOVASCULAR SCREENING; LDL GOAL LESS THAN 100     Immunosuppressed status (H)     Moraxella catarrhalis pneumonia (H)     Pyelonephritis     Cervical cancer screening     History of recurrent urinary tract infection     EBV (Jaime-Barr virus) viremia     Personal Hx:  Social History     Social History     Marital status:      Spouse name: N/A     Number of children: N/A     Years of education: N/A     Occupational History           [employer]     Social History Main Topics     Smoking status: Never Smoker     Smokeless tobacco: Never Used     Alcohol use No     Drug use: No     Sexual activity: Yes     Partners: Male     Other Topics Concern      Service No     Blood Transfusions No     Weight Concern No     Exercise No     Bike Helmet Not Asked     na     Seat Belt Yes     Social History Narrative    Ms. Underwood emigrated from the [place] in [place], [place]. She is currently living with her  in [place], [place].  She works for the [employer] in [place] (a ).      Allergies:  Allergies   Allergen Reactions     Nkda [No Known Drug Allergies]      Medications:  Prior to Admission medications    Medication Sig Start Date End Date Taking? Authorizing Provider   azaTHIOprine (IMURAN) 50 MG tablet Take 2 tablets by mouth daily. 6/21/12  Yes Walter Chong MD   predniSONE (DELTASONE) 5 MG tablet Take 1 tablet by mouth daily. 5/18/12  Yes Kong Renteria MD   PROGRAF 0.5 MG capsule Take 1 capsule by mouth 2 times daily. 3/5/12  Yes Kong Renteria MD     Vitals:  /65  Pulse 82  Temp 98.2  F (36.8  C) (Oral)  Ht 1.524 m (5')  Wt 42.5 kg (93 lb 12.8 oz)  SpO2 98%  BMI 18.32 kg/m2    Exam:   GENERAL APPEARANCE: alert and no distress  HENT: mouth without  ulcers or lesions  LYMPHATICS: no cervical or supraclavicular nodes  RESP: lungs clear to auscultation - no rales, rhonchi or wheezes  CV: regular rhythm, normal rate, no rub, no murmur  EDEMA: no LE edema bilaterally  ABDOMEN: soft, nontender, bowel sounds normal  MS: extremities normal - no gross deformities noted, no evidence of inflammation in joints, no muscle tenderness  SKIN: no rash  TX KIDNEY: normal    Results:   Recent Results (from the past 504 hour(s))   Tacrolimus level    Collection Time: 06/14/17  7:20 AM   Result Value Ref Range    Tacrolimus Last Dose 2130,6/13/17     Tacrolimus Level 9.7 5.0 - 15.0 ug/L   T cell subset profile    Collection Time: 06/14/17  7:20 AM   Result Value Ref Range    IFC Specimen Blood     CD3 Mature T 70 49 - 84 %    CD4 Brewster T 35 28 - 63 %    CD8 Suppressor T 34 10 - 40 %    CD4:CD8 Ratio 1.03 (L) 1.40 - 2.60    Absolute CD3 619 603 - 2990 cells/uL    Absolute CD4 304 (L) 441 - 2156 cells/uL    Absolute CD8 303 125 - 1312 cells/uL    T Cell Subset Profile Antibody Interpretation << Do Not Report >>    Erythrocyte sedimentation rate auto    Collection Time: 06/14/17  7:20 AM   Result Value Ref Range    Sed Rate 123 (H) 0 - 20 mm/h   CRP inflammation    Collection Time: 06/14/17  7:20 AM   Result Value Ref Range    CRP Inflammation <2.9 0.0 - 8.0 mg/L   Renal panel    Collection Time: 06/14/17  7:20 AM   Result Value Ref Range    Sodium 140 133 - 144 mmol/L    Potassium 4.0 3.4 - 5.3 mmol/L    Chloride 111 (H) 94 - 109 mmol/L    Carbon Dioxide 21 20 - 32 mmol/L    Anion Gap 8 3 - 14 mmol/L    Glucose 90 70 - 99 mg/dL    Urea Nitrogen 24 7 - 30 mg/dL    Creatinine 1.04 0.52 - 1.04 mg/dL    GFR Estimate 57 (L) >60 mL/min/1.7m2    GFR Estimate If Black 70 >60 mL/min/1.7m2    Calcium 9.3 8.5 - 10.1 mg/dL    Phosphorus 3.2 2.5 - 4.5 mg/dL    Albumin 3.6 3.4 - 5.0 g/dL   CBC with platelets differential    Collection Time: 06/14/17  7:20 AM   Result Value Ref Range    WBC 3.8  (L) 4.0 - 11.0 10e9/L    RBC Count 3.30 (L) 3.8 - 5.2 10e12/L    Hemoglobin 11.9 11.7 - 15.7 g/dL    Hematocrit 34.4 (L) 35.0 - 47.0 %     (H) 78 - 100 fl    MCH 36.1 (H) 26.5 - 33.0 pg    MCHC 34.6 31.5 - 36.5 g/dL    RDW 13.1 10.0 - 15.0 %    Platelet Count 265 150 - 450 10e9/L    Diff Method Automated Method     % Neutrophils 65.6 %    % Lymphocytes 20.2 %    % Monocytes 12.9 %    % Eosinophils 0.5 %    % Basophils 0.3 %    % Immature Granulocytes 0.5 %    Nucleated RBCs 0 0 /100    Absolute Neutrophil 2.5 1.6 - 8.3 10e9/L    Absolute Lymphocytes 0.8 0.8 - 5.3 10e9/L    Absolute Monocytes 0.5 0.0 - 1.3 10e9/L    Absolute Eosinophils 0.0 0.0 - 0.7 10e9/L    Absolute Basophils 0.0 0.0 - 0.2 10e9/L    Abs Immature Granulocytes 0.0 0 - 0.4 10e9/L    Absolute Nucleated RBC 0.0    CMV DNA quantification    Collection Time: 06/14/17  7:20 AM   Result Value Ref Range    CMV DNA Quantitation Specimen Plasma, EDTA anticoagulant     CMV Quant IU/mL  CMVND [IU]/mL     CMV DNA Not Detected   The GENE AmpliPrep/GENE TaqMan CMV Test is an FDA-approved in vitro nucleic   acid amplification test for the quantitation of cytomegalovirus DNA in human   plasma (EDTA plasma) using the GENE AmpliPrep Instrument for automated viral   nucleic acid extraction and the [employer] TaqMan Analyzer or [employer] TaqMan for   automated Real Time amplification and detection of the viral nucleic acid   target.   Titer results are reported in International Units/mL (IU/mL using 1st WHO   International standard for Human Cytomegalovirus for Nucleic Acid Amplification   based assays. The conversion factor between CMV DNA copis/mL (as defined by the   Roche GENE TaqMan CMV test) and International Units is the CMV DNA   concentration in IU/mL x 1.1 copies/IU = CMV DNA in copies/mL.   This assay has received FDA approval for the testing of human plasma only. The   Infectious Disease Diagnostic Laboratory at the [facility]    [facility], has validated the pe rformance characteristics of the Roche   CMV assay for plasma, bronchial alveolar lavage/wash and urine.      Log IU/mL of CMVQNT Not Calculated <2.1 [Log_IU]/mL   EBV DNA PCR Quantitative Whole Blood    Collection Time: 06/14/17  7:20 AM   Result Value Ref Range    EBV DNA Copies/mL 78132 (A) EBVNEG [Copies]/mL    EBV DNA Log of Copies 5.0 (H) <2.7 [Log_copies]/mL   BK virus PCR quantitative    Collection Time: 06/14/17  7:20 AM   Result Value Ref Range    BK Virus Specimen Plasma     BK Virus Result BK Virus DNA Not Detected BKNEG copies/mL    BK Virus Log  <2.7 Log copies/mL     Not Calculated   The Real-Time quantitative BK Virus assay was developed and its performance   characteristics determined by the Infectious Diseases Diagnostic Laboratory at   the [facility] in [place], [place]. The   primers and probes for each analyte are Analyte Specific Reagents (ASRs)   manufactured by [employer].   ASRs are used in many laboratory tests necessary for standard medical care and   generally do not require U.S. Food and Drug Administration approval. The FDA   has determined that such clearance or approval is not necessary.   This test is used for clinical purposes. It should not be regarded as   investigational or for research. This laboratory is certified under the   Clinical Laboratory Improvement Amendments of 1988 (CLIA-88) as qualified to   perform high complexity clinical laboratory testing.     Routine UA with micro - Reflex to culture    Collection Time: 06/14/17  7:49 AM   Result Value Ref Range    Color Urine Yellow     Appearance Urine Clear     Glucose Urine Negative NEG mg/dL    Bilirubin Urine Negative NEG    Ketones Urine Negative NEG mg/dL    Specific Gravity Urine 1.019 1.003 - 1.035    Blood Urine Small (A) NEG    pH Urine 5.0 5.0 - 7.0 pH    Protein Albumin Urine Negative NEG mg/dL    Urobilinogen mg/dL 0.0 0.0 - 2.0 mg/dL    Nitrite  Urine Negative NEG    Leukocyte Esterase Urine Negative NEG    Source Midstream Urine     WBC Urine 3 (H) 0 - 2 /HPF    RBC Urine 3 (H) 0 - 2 /HPF    Bacteria Urine Few (A) NEG /HPF    Squamous Epithelial /HPF Urine 4 (H) 0 - 1 /HPF    Mucous Urine Present (A) NEG /LPF         Walter Chong MD

## 2017-06-20 NOTE — NURSING NOTE
Chief Complaint   Patient presents with     RECHECK     Follow up kidney transplant.       Initial /65  Pulse 82  Temp 98.2  F (36.8  C) (Oral)  Ht 1.524 m (5')  Wt 42.5 kg (93 lb 12.8 oz)  SpO2 98%  BMI 18.32 kg/m2 Estimated body mass index is 18.32 kg/(m^2) as calculated from the following:    Height as of this encounter: 1.524 m (5').    Weight as of this encounter: 42.5 kg (93 lb 12.8 oz).  Medication Reconciliation: complete   Jo Brand., CMA

## 2017-06-20 NOTE — MR AVS SNAPSHOT
After Visit Summary   6/20/2017    Nena Underwood    MRN: [id number]           Patient Information     Date Of Birth          1975        Visit Information        Provider Department      6/20/2017 3:40 PM Walter Chong MD [facility] Nephrology        Today's Diagnoses     Immunosuppressed status (H)    -  1    Kidney replaced by transplant        Aftercare following organ transplant        EBV (Jaime-Barr virus) viremia           Follow-ups after your visit        Follow-up notes from your care team     Return in about 6 months (around 12/20/2017).      Your next 10 appointments already scheduled     Jul 01, 2017  9:00 AM CDT   LAB with  LAB   [facility] ([facility])    [address] [place] [id number] [phone]           Patient must bring picture ID.  Patient should be prepared to give a urine specimen  Please do not eat 10-12 hours before your appointment if you are coming in fasting for labs on lipids, cholesterol, or glucose (sugar).  Pregnant women should follow their Care Team instructions. Water with medications is okay. Do not drink coffee or other fluids.   If you have concerns about taking  your medications, please ask at office or if scheduling via [employer], send a message by clicking on Secure Messaging, Message Your Care Team.            Jul 01, 2017  9:20 AM CDT   (Arrive by 9:05 AM)   CT CHEST ABDOMEN PELVIS W/O & W CONTRAST with CT1   [facility] [place] [facility] ([facility])    [address] [place] [id number] [phone]           Please bring any scans or X-rays taken at other hospitals, if similar tests were done. Also bring a list of your medicines, including vitamins, minerals and over-the-counter drugs. It is safest to leave personal items at home.  Be sure to tell your doctor:   If you have any allergies.   If there s any chance you are pregnant.    If you are breastfeeding.   If you have any special needs.  You may have contrast for this exam. To prepare:   Do not eat or drink for 2 hours before your exam. If you need to take medicine, you may take it with small sips of water. (We may ask you to take liquid medicine as well.)   The day before your exam, drink extra fluids at least six 8-ounce glasses (unless your doctor tells you to restrict your fluids).  Patients over 70 or patients with diabetes or kidney problems:   If you haven t had a blood test (creatinine test) within the last 30 days, go to your clinic or Diagnostic Imaging Department for this test.  If you have diabetes:   If your kidney function is normal, continue taking your metformin (Avandamet, Glucophage, Glucovance, Metaglip) on the day of your exam.   If your kidney function is abnormal, wait 48 hours before restarting this medicine.  You will have oral contrast for this exam:   You will drink the contrast at home. Get this from your clinic or Diagnostic Imaging Department. Please follow the directions given.  Please wear loose clothing, such as a sweat suit or jogging clothes. Avoid snaps, zippers and other metal. We may ask you to undress and put on a hospital gown.  If you have any questions, please call the Imaging Department where you will have your exam.            Jan 09, 2018  4:50 PM CST   (Arrive by 4:20 PM)   Return Kidney Transplant with Walter Chong MD   [facility] Nephrology ([facility])    [address] [place] [id number] [phone]              Who to contact     If you have questions or need follow up information about today's clinic visit or your schedule please contact [facility] NEPHROLOGY directly at [phone].  Normal or non-critical lab and imaging results will be communicated to you by MyChart, letter or phone within 4 business days after the clinic has received the results. If you do not hear from us  within 7 days, please contact the clinic through [employer] or phone. If you have a critical or abnormal lab result, we will notify you by phone as soon as possible.  Submit refill requests through [employer] or call your pharmacy and they will forward the refill request to us. Please allow 3 business days for your refill to be completed.          Additional Information About Your Visit        [employer] Online Backup Information     [employer] gives you secure access to your electronic health record. If you see a primary care provider, you can also send messages to your care team and make appointments. If you have questions, please call your primary care clinic.  If you do not have a primary care provider, please call [phone] and they will assist you.        Care EveryWhere ID     This is your Care EveryWhere ID. This could be used by other organizations to access your [facility] medical records  [id number]        Your Vitals Were     Pulse Temperature Height Pulse Oximetry BMI (Body Mass Index)       82 98.2  F (36.8  C) (Oral) 1.524 m (5') 98% 18.32 kg/m2        Blood Pressure from Last 3 Encounters:   06/20/17 100/65   05/11/17 110/70   05/03/17 112/74    Weight from Last 3 Encounters:   06/20/17 42.5 kg (93 lb 12.8 oz)   05/11/17 41.3 kg (91 lb)   05/03/17 41.2 kg (90 lb 13.3 oz)              Today, you had the following     No orders found for display         Today's Medication Changes          These changes are accurate as of: 6/20/17  4:04 PM.  If you have any questions, ask your nurse or doctor.               These medicines have changed or have updated prescriptions.        Dose/Directions    azaTHIOprine 50 MG tablet   Commonly known as:  IMURAN   This may have changed:  how much to take   Used for:  Kidney replaced by transplant        Dose:  50 mg   Take 1 tablet (50 mg) by mouth daily   Quantity:  90 tablet   Refills:  3            Where to get your medicines      These medications were sent to [facility] - [place], [place] - [address] AT [facility]  [address], [place] [address]     Phone:  [phone]     azaTHIOprine 50 MG tablet                Primary Care Provider Office Phone # Fax #    Jorge L Tucker [phone]       [facility] [address] [facility] [address]        Thank you!     Thank you for choosing [facility] NEPHROLOGY  for your care. Our goal is always to provide you with excellent care. Hearing back from our patients is one way we can continue to improve our services. Please take a few minutes to complete the written survey that you may receive in the mail after your visit with us. Thank you!             Your Updated Medication List - Protect others around you: Learn how to safely use, store and throw away your medicines at www.disposemymeds.org.          This list is accurate as of: 6/20/17  4:04 PM.  Always use your most recent med list.                   Brand Name Dispense Instructions for use    azaTHIOprine 50 MG tablet    IMURAN    90 tablet    Take 1 tablet (50 mg) by mouth daily       etonogestrel 68 MG Impl    IMPLANON/NEXPLANON    1 each    1 each (68 mg) by Subdermal route once for 1 dose       predniSONE 5 MG tablet    DELTASONE    90 tablet    Take 1 tablet (5 mg) by mouth daily       sulfamethoxazole-trimethoprim 400-80 MG per tablet    BACTRIM/SEPTRA    90 tablet    Take 1 tablet by mouth daily       tacrolimus capsule     60 capsule    Take 1 capsule (0.5 mg) by mouth 2 times daily       TYLENOL PO      Take 500 mg by mouth every 4 hours as needed for mild pain or fever

## 2017-06-20 NOTE — PROGRESS NOTES
Assessment and Plan:  1. LDKT - baseline Cr ~ 0.9-1.0, which has remained stable.  Normal proteinuria.  With EBV viremia, will decrease azathioprine to 50 mg daily.  May need to look at lowering tacrolimus levels next.  Because she is on the lowest pill dosage for tacrolimus, may need to change to liquid dosing or change medication to cyclosporine to allow lower drug levels.  Will make no other changes in immunosuppression.  2. BP - well controlled at target of less than 140/90 off antihypertensive medications.  No changes.  3. Recurrent UTIs - no recent infection.  4. EBV viremia - new moderately elevated EBV viremia, near 100K.  Will decrease immunosuppression and obtain CT chest/abd/pelvis to rule out lymphoma.  5. Recommend return visit in 6 months.    Assessment and plan was discussed with patient and she voiced her understanding and agreement.    Reason for Visit:  Ms. Underwood is here for routine follow up.    HPI:   Nenarobson Underwood is a 42 year old female with ESKD from unknown etiology and is status post LDKT on 6/23/10.         Transplant Hx:       Tx: LDKT  Date: 6/23/10       Present Maintenance IS: Tacrolimus, Azathioprine and Prednisone       Baseline Creatinine: 0.9-1.0    Ms. Underwood reports feeling good overall with minimal medical complaints.  Her energy level has been really good and pretty much normal.  She is active and does get some exercise.  Denies any chest pain or shortness of breath with exertion.  No cough or URI symptoms.  Appetite is good and weight has been stable to up a couple of pounds.  No nausea, vomiting or diarrhea.  No fever, sweats or chills.  Rare, very mild night sweats where she wakes up feeling warm and slight sweating under her arms.  No leg swelling.  No pain or burning with urination.    Home BP: Not checked.      ROS:   A comprehensive review of systems was obtained and negative, except as noted in the HPI or PMH.    Active Medical Problems:  Patient Active Problem List    Diagnosis     Kidney replaced by transplant     Aftercare following organ transplant     CARDIOVASCULAR SCREENING; LDL GOAL LESS THAN 100     Immunosuppressed status (H)     Moraxella catarrhalis pneumonia (H)     Pyelonephritis     Cervical cancer screening     History of recurrent urinary tract infection     EBV (Jaime-Barr virus) viremia     Personal Hx:  Social History     Social History     Marital status:      Spouse name: N/A     Number of children: N/A     Years of education: N/A     Occupational History           [employer]     Social History Main Topics     Smoking status: Never Smoker     Smokeless tobacco: Never Used     Alcohol use No     Drug use: No     Sexual activity: Yes     Partners: Male     Other Topics Concern      Service No     Blood Transfusions No     Weight Concern No     Exercise No     Bike Helmet Not Asked     na     Seat Belt Yes     Social History Narrative    Ms. Underwood emig[place] from the [facility] [facility] [place] in May, [place]. She is currently living with her  in [place], [place].  She works for the [employer] in [place] (a ).      Allergies:  Allergies   Allergen Reactions     Nkda [No Known Drug Allergies]      Medications:  Prior to Admission medications    Medication Sig Start Date End Date Taking? Authorizing Provider   azaTHIOprine (IMURAN) 50 MG tablet Take 2 tablets by mouth daily. 6/21/12  Yes Walter Chong MD   predniSONE (DELTASONE) 5 MG tablet Take 1 tablet by mouth daily. 5/18/12  Yes Kong Renteria MD   PROGRAF 0.5 MG capsule Take 1 capsule by mouth 2 times daily. 3/5/12  Yes Kong Renteria MD     Vitals:  /65  Pulse 82  Temp 98.2  F (36.8  C) (Oral)  Ht 1.524 m (5')  Wt 42.5 kg (93 lb 12.8 oz)  SpO2 98%  BMI 18.32 kg/m2    Exam:   GENERAL APPEARANCE: alert and no distress  HENT: mouth without ulcers or lesions  LYMPHATICS: no cervical or supraclavicular nodes  RESP: lungs clear to  auscultation - no rales, rhonchi or wheezes  CV: regular rhythm, normal rate, no rub, no murmur  EDEMA: no LE edema bilaterally  ABDOMEN: soft, nontender, bowel sounds normal  MS: extremities normal - no gross deformities noted, no evidence of inflammation in joints, no muscle tenderness  SKIN: no rash  TX KIDNEY: normal    Results:   Recent Results (from the past 504 hour(s))   Tacrolimus level    Collection Time: 06/14/17  7:20 AM   Result Value Ref Range    Tacrolimus Last Dose 2130,6/13/17     Tacrolimus Level 9.7 5.0 - 15.0 ug/L   T cell subset profile    Collection Time: 06/14/17  7:20 AM   Result Value Ref Range    IFC Specimen Blood     CD3 Mature T 70 49 - 84 %    CD4 Oto T 35 28 - 63 %    CD8 Suppressor T 34 10 - 40 %    CD4:CD8 Ratio 1.03 (L) 1.40 - 2.60    Absolute CD3 619 603 - 2990 cells/uL    Absolute CD4 304 (L) 441 - 2156 cells/uL    Absolute CD8 303 125 - 1312 cells/uL    T Cell Subset Profile Antibody Interpretation << Do Not Report >>    Erythrocyte sedimentation rate auto    Collection Time: 06/14/17  7:20 AM   Result Value Ref Range    Sed Rate 123 (H) 0 - 20 mm/h   CRP inflammation    Collection Time: 06/14/17  7:20 AM   Result Value Ref Range    CRP Inflammation <2.9 0.0 - 8.0 mg/L   Renal panel    Collection Time: 06/14/17  7:20 AM   Result Value Ref Range    Sodium 140 133 - 144 mmol/L    Potassium 4.0 3.4 - 5.3 mmol/L    Chloride 111 (H) 94 - 109 mmol/L    Carbon Dioxide 21 20 - 32 mmol/L    Anion Gap 8 3 - 14 mmol/L    Glucose 90 70 - 99 mg/dL    Urea Nitrogen 24 7 - 30 mg/dL    Creatinine 1.04 0.52 - 1.04 mg/dL    GFR Estimate 57 (L) >60 mL/min/1.7m2    GFR Estimate If Black 70 >60 mL/min/1.7m2    Calcium 9.3 8.5 - 10.1 mg/dL    Phosphorus 3.2 2.5 - 4.5 mg/dL    Albumin 3.6 3.4 - 5.0 g/dL   CBC with platelets differential    Collection Time: 06/14/17  7:20 AM   Result Value Ref Range    WBC 3.8 (L) 4.0 - 11.0 10e9/L    RBC Count 3.30 (L) 3.8 - 5.2 10e12/L    Hemoglobin 11.9 11.7 -  15.7 g/dL    Hematocrit 34.4 (L) 35.0 - 47.0 %     (H) 78 - 100 fl    MCH 36.1 (H) 26.5 - 33.0 pg    MCHC 34.6 31.5 - 36.5 g/dL    RDW 13.1 10.0 - 15.0 %    Platelet Count 265 150 - 450 10e9/L    Diff Method Automated Method     % Neutrophils 65.6 %    % Lymphocytes 20.2 %    % Monocytes 12.9 %    % Eosinophils 0.5 %    % Basophils 0.3 %    % Immature Granulocytes 0.5 %    Nucleated RBCs 0 0 /100    Absolute Neutrophil 2.5 1.6 - 8.3 10e9/L    Absolute Lymphocytes 0.8 0.8 - 5.3 10e9/L    Absolute Monocytes 0.5 0.0 - 1.3 10e9/L    Absolute Eosinophils 0.0 0.0 - 0.7 10e9/L    Absolute Basophils 0.0 0.0 - 0.2 10e9/L    Abs Immature Granulocytes 0.0 0 - 0.4 10e9/L    Absolute Nucleated RBC 0.0    CMV DNA quantification    Collection Time: 06/14/17  7:20 AM   Result Value Ref Range    CMV DNA Quantitation Specimen Plasma, EDTA anticoagulant     CMV Quant IU/mL  CMVND [IU]/mL     CMV DNA Not Detected   The GENE AmpliPrep/GENE TaqMan CMV Test is an FDA-approved in vitro nucleic   acid amplification test for the quantitation of cytomegalovirus DNA in human   plasma (EDTA plasma) using the GENE AmpliPrep Instrument for automated viral   nucleic acid extraction and the GENE TaqMan Analyzer or Syntricity TaqMan for   automated Real Time amplification and detection of the viral nucleic acid   target.   Titer results are reported in International Units/mL (IU/mL using 1st WHO   International standard for Human Cytomegalovirus for Nucleic Acid Amplification   based assays. The conversion factor between CMV DNA copis/mL (as defined by the   Roche GENE TaqMan CMV test) and International Units is the CMV DNA   concentration in IU/mL x 1.1 copies/IU = CMV DNA in copies/mL.   This assay has received FDA approval for the testing of human plasma only. The   Infectious Disease Diagnostic Laboratory at the [facility] [place], has validated the pe rformance characteristics of the Roche   CMV assay for  plasma, bronchial alveolar lavage/wash and urine.      Log IU/mL of CMVQNT Not Calculated <2.1 [Log_IU]/mL   EBV DNA PCR Quantitative Whole Blood    Collection Time: 06/14/17  7:20 AM   Result Value Ref Range    EBV DNA Copies/mL 17575 (A) EBVNEG [Copies]/mL    EBV DNA Log of Copies 5.0 (H) <2.7 [Log_copies]/mL   BK virus PCR quantitative    Collection Time: 06/14/17  7:20 AM   Result Value Ref Range    BK Virus Specimen Plasma     BK Virus Result BK Virus DNA Not Detected BKNEG copies/mL    BK Virus Log  <2.7 Log copies/mL     Not Calculated   The Real-Time quantitative BK Virus assay was developed and its performance   characteristics determined by the Infectious Diseases Diagnostic Laboratory at   the [facility] in [place], [place]. The   primers and probes for each analyte are Analyte Specific Reagents (ASRs)   manufactured by [employer].   ASRs are used in many laboratory tests necessary for standard medical care and   generally do not require U.S. Food and Drug Administration approval. The FDA   has determined that such clearance or approval is not necessary.   This test is used for clinical purposes. It should not be regarded as   investigational or for research. This laboratory is certified under the   Clinical Laboratory Improvement Amendments of 1988 (CLIA-88) as qualified to   perform high complexity clinical laboratory testing.     Routine UA with micro - Reflex to culture    Collection Time: 06/14/17  7:49 AM   Result Value Ref Range    Color Urine Yellow     Appearance Urine Clear     Glucose Urine Negative NEG mg/dL    Bilirubin Urine Negative NEG    Ketones Urine Negative NEG mg/dL    Specific Gravity Urine 1.019 1.003 - 1.035    Blood Urine Small (A) NEG    pH Urine 5.0 5.0 - 7.0 pH    Protein Albumin Urine Negative NEG mg/dL    Urobilinogen mg/dL 0.0 0.0 - 2.0 mg/dL    Nitrite Urine Negative NEG    Leukocyte Esterase Urine Negative NEG    Source Midstream Urine     WBC  Urine 3 (H) 0 - 2 /HPF    RBC Urine 3 (H) 0 - 2 /HPF    Bacteria Urine Few (A) NEG /HPF    Squamous Epithelial /HPF Urine 4 (H) 0 - 1 /HPF    Mucous Urine Present (A) NEG /LPF

## 2017-06-21 ENCOUNTER — TELEPHONE (OUTPATIENT)
Dept: TRANSPLANT | Facility: CLINIC | Age: 42
End: 2017-06-21

## 2017-06-21 DIAGNOSIS — Z94.0 KIDNEY REPLACED BY TRANSPLANT: ICD-10-CM

## 2017-06-21 DIAGNOSIS — Z48.298 AFTERCARE FOLLOWING ORGAN TRANSPLANT: Primary | ICD-10-CM

## 2017-06-21 NOTE — TELEPHONE ENCOUNTER
----- Message from Walter Chong MD sent at 6/20/2017  4:04 PM CDT -----  Griselda,        I mistakenly thought she was only on azathioprine 50 mg, but she really was on 100 mg, so I decreased her down to 50 mg daily.  Let's repeat EBV PCR in 3-4 weeks and if not improved, will look to lower tacrolimus dose (may need to change to cyclosporine to lower level).

## 2017-06-21 NOTE — TELEPHONE ENCOUNTER
Call placed to patient: No answer. Voice message left regarding instructions below. Lab orders updated

## 2017-07-01 ENCOUNTER — RESULTS ONLY (OUTPATIENT)
Dept: OTHER | Facility: CLINIC | Age: 42
End: 2017-07-01

## 2017-07-01 DIAGNOSIS — T86.10 COMPLICATIONS, KIDNEY TRANSPLANT: ICD-10-CM

## 2017-07-01 DIAGNOSIS — Z48.298 AFTERCARE FOLLOWING ORGAN TRANSPLANT: ICD-10-CM

## 2017-07-01 DIAGNOSIS — Z94.0 KIDNEY TRANSPLANTED: ICD-10-CM

## 2017-07-01 DIAGNOSIS — Z94.0 KIDNEY REPLACED BY TRANSPLANT: ICD-10-CM

## 2017-07-01 LAB
ANION GAP SERPL CALCULATED.3IONS-SCNC: 8 MMOL/L (ref 3–14)
BUN SERPL-MCNC: 19 MG/DL (ref 7–30)
CALCIUM SERPL-MCNC: 8.7 MG/DL (ref 8.5–10.1)
CHLORIDE SERPL-SCNC: 108 MMOL/L (ref 94–109)
CO2 SERPL-SCNC: 25 MMOL/L (ref 20–32)
CREAT SERPL-MCNC: 1 MG/DL (ref 0.52–1.04)
CREAT UR-MCNC: 128 MG/DL
ERYTHROCYTE [DISTWIDTH] IN BLOOD BY AUTOMATED COUNT: 12.9 % (ref 10–15)
GFR SERPL CREATININE-BSD FRML MDRD: 60 ML/MIN/1.7M2
GLUCOSE SERPL-MCNC: 83 MG/DL (ref 70–99)
HCT VFR BLD AUTO: 36.5 % (ref 35–47)
HGB BLD-MCNC: 12.4 G/DL (ref 11.7–15.7)
MCH RBC QN AUTO: 36 PG (ref 26.5–33)
MCHC RBC AUTO-ENTMCNC: 34 G/DL (ref 31.5–36.5)
MCV RBC AUTO: 106 FL (ref 78–100)
PLATELET # BLD AUTO: 275 10E9/L (ref 150–450)
POTASSIUM SERPL-SCNC: 3.9 MMOL/L (ref 3.4–5.3)
PROT UR-MCNC: 0.15 G/L
PROT/CREAT 24H UR: 0.12 G/G CR (ref 0–0.2)
RBC # BLD AUTO: 3.44 10E12/L (ref 3.8–5.2)
SODIUM SERPL-SCNC: 141 MMOL/L (ref 133–144)
TACROLIMUS BLD-MCNC: 8 UG/L (ref 5–15)
TME LAST DOSE: NORMAL H
WBC # BLD AUTO: 3.5 10E9/L (ref 4–11)

## 2017-07-03 LAB
BKV DNA # SPEC NAA+PROBE: NORMAL COPIES/ML
BKV DNA SPEC NAA+PROBE-LOG#: NORMAL LOG COPIES/ML
PRA DONOR SPECIFIC ABY: NORMAL
SPECIMEN SOURCE: NORMAL

## 2017-07-04 ENCOUNTER — TELEPHONE (OUTPATIENT)
Dept: TRANSPLANT | Facility: CLINIC | Age: 42
End: 2017-07-04

## 2017-07-04 DIAGNOSIS — Z94.0 KIDNEY TRANSPLANTED: Primary | ICD-10-CM

## 2017-07-04 NOTE — TELEPHONE ENCOUNTER
Notes Recorded by Walter Chong MD on 7/3/2017 at 10:47 AM  Unremarkable CT scan with no evidence for lymphoma.    Updated surveillance  EBV PCR QT

## 2017-07-18 NOTE — TELEPHONE ENCOUNTER
Left message explaining to patient to call back to transplant office if Dr Chong or nurse haven't reviewed the CT results.

## 2017-07-21 LAB
DONOR IDENTIFICATION: NORMAL
DSA COMMENTS: NORMAL
DSA PRESENT: NO
DSA TEST METHOD: NORMAL
ORGAN: NORMAL
SA1 CELL: NORMAL
SA1 COMMENTS: NORMAL
SA1 HI RISK ABY: NORMAL
SA1 MOD RISK ABY: NORMAL
SA1 TEST METHOD: NORMAL
SA2 CELL: NORMAL
SA2 COMMENTS: NORMAL
SA2 HI RISK ABY UA: NORMAL
SA2 MOD RISK ABY: NORMAL
SA2 TEST METHOD: NORMAL
UNOS CPRA: 7

## 2017-08-05 DIAGNOSIS — Z94.0 KIDNEY TRANSPLANTED: ICD-10-CM

## 2017-08-05 DIAGNOSIS — Z48.298 AFTERCARE FOLLOWING ORGAN TRANSPLANT: ICD-10-CM

## 2017-08-05 DIAGNOSIS — Z94.0 KIDNEY REPLACED BY TRANSPLANT: ICD-10-CM

## 2017-08-05 LAB
ANION GAP SERPL CALCULATED.3IONS-SCNC: 6 MMOL/L (ref 3–14)
BUN SERPL-MCNC: 29 MG/DL (ref 7–30)
CALCIUM SERPL-MCNC: 9 MG/DL (ref 8.5–10.1)
CHLORIDE SERPL-SCNC: 109 MMOL/L (ref 94–109)
CO2 SERPL-SCNC: 22 MMOL/L (ref 20–32)
CREAT SERPL-MCNC: 1.11 MG/DL (ref 0.52–1.04)
ERYTHROCYTE [DISTWIDTH] IN BLOOD BY AUTOMATED COUNT: 11.5 % (ref 10–15)
GFR SERPL CREATININE-BSD FRML MDRD: 53 ML/MIN/1.7M2
GLUCOSE SERPL-MCNC: 78 MG/DL (ref 70–99)
HCT VFR BLD AUTO: 36.7 % (ref 35–47)
HGB BLD-MCNC: 12.6 G/DL (ref 11.7–15.7)
MCH RBC QN AUTO: 35.5 PG (ref 26.5–33)
MCHC RBC AUTO-ENTMCNC: 34.3 G/DL (ref 31.5–36.5)
MCV RBC AUTO: 103 FL (ref 78–100)
PLATELET # BLD AUTO: 221 10E9/L (ref 150–450)
POTASSIUM SERPL-SCNC: 5.3 MMOL/L (ref 3.4–5.3)
RBC # BLD AUTO: 3.55 10E12/L (ref 3.8–5.2)
SODIUM SERPL-SCNC: 137 MMOL/L (ref 133–144)
TACROLIMUS BLD-MCNC: 9.1 UG/L (ref 5–15)
TME LAST DOSE: NORMAL H
WBC # BLD AUTO: 3.5 10E9/L (ref 4–11)

## 2017-08-07 LAB
BKV DNA # SPEC NAA+PROBE: NORMAL COPIES/ML
BKV DNA SPEC NAA+PROBE-LOG#: NORMAL LOG COPIES/ML
SPECIMEN SOURCE: NORMAL

## 2017-08-08 LAB
EBV DNA # SPEC NAA+PROBE: ABNORMAL {COPIES}/ML
EBV DNA SPEC NAA+PROBE-LOG#: 4.6 {LOG_COPIES}/ML

## 2017-09-16 DIAGNOSIS — Z48.298 AFTERCARE FOLLOWING ORGAN TRANSPLANT: ICD-10-CM

## 2017-09-16 DIAGNOSIS — Z94.0 KIDNEY REPLACED BY TRANSPLANT: ICD-10-CM

## 2017-09-16 DIAGNOSIS — T86.10 COMPLICATIONS, KIDNEY TRANSPLANT: ICD-10-CM

## 2017-09-16 DIAGNOSIS — Z94.0 KIDNEY TRANSPLANTED: ICD-10-CM

## 2017-09-16 LAB
ANION GAP SERPL CALCULATED.3IONS-SCNC: 7 MMOL/L (ref 3–14)
BUN SERPL-MCNC: 29 MG/DL (ref 7–30)
CALCIUM SERPL-MCNC: 8.9 MG/DL (ref 8.5–10.1)
CD3 CELLS # BLD: 530 CELLS/UL (ref 603–2990)
CD3 CELLS NFR BLD: 64 % (ref 49–84)
CD3+CD4+ CELLS # BLD: 227 CELLS/UL (ref 441–2156)
CD3+CD4+ CELLS NFR BLD: 27 % (ref 28–63)
CD3+CD4+ CELLS/CD3+CD8+ CLL BLD: 0.79 % (ref 1.4–2.6)
CD3+CD8+ CELLS # BLD: 282 CELLS/UL (ref 125–1312)
CD3+CD8+ CELLS NFR BLD: 34 % (ref 10–40)
CHLORIDE SERPL-SCNC: 111 MMOL/L (ref 94–109)
CO2 SERPL-SCNC: 21 MMOL/L (ref 20–32)
CREAT SERPL-MCNC: 1.16 MG/DL (ref 0.52–1.04)
ERYTHROCYTE [DISTWIDTH] IN BLOOD BY AUTOMATED COUNT: 11.5 % (ref 10–15)
GFR SERPL CREATININE-BSD FRML MDRD: 51 ML/MIN/1.7M2
GLUCOSE SERPL-MCNC: 105 MG/DL (ref 70–99)
HCT VFR BLD AUTO: 38.7 % (ref 35–47)
HGB BLD-MCNC: 13 G/DL (ref 11.7–15.7)
IFC SPECIMEN: ABNORMAL
MCH RBC QN AUTO: 34 PG (ref 26.5–33)
MCHC RBC AUTO-ENTMCNC: 33.6 G/DL (ref 31.5–36.5)
MCV RBC AUTO: 101 FL (ref 78–100)
PLATELET # BLD AUTO: 248 10E9/L (ref 150–450)
POTASSIUM SERPL-SCNC: 3.4 MMOL/L (ref 3.4–5.3)
RBC # BLD AUTO: 3.82 10E12/L (ref 3.8–5.2)
SODIUM SERPL-SCNC: 139 MMOL/L (ref 133–144)
TACROLIMUS BLD-MCNC: 9.1 UG/L (ref 5–15)
TME LAST DOSE: NORMAL H
WBC # BLD AUTO: 4.2 10E9/L (ref 4–11)

## 2017-09-18 DIAGNOSIS — Z94.0 KIDNEY TRANSPLANTED: Primary | ICD-10-CM

## 2017-09-18 LAB
BKV DNA # SPEC NAA+PROBE: NORMAL COPIES/ML
BKV DNA SPEC NAA+PROBE-LOG#: NORMAL LOG COPIES/ML
EBV DNA # SPEC NAA+PROBE: ABNORMAL {COPIES}/ML
EBV DNA SPEC NAA+PROBE-LOG#: 4.7 {LOG_COPIES}/ML
SPECIMEN SOURCE: NORMAL

## 2017-09-18 NOTE — TELEPHONE ENCOUNTER
Tacrolimus 9.1  Please call pt to confirm this was a good trough level. Confirm dose 0.5 mg BID. Decrease tacrolimus to 0.3 mg BID and repeat labs next week.

## 2017-09-20 NOTE — TELEPHONE ENCOUNTER
Pt returned call. She just picked up her month supply of 0.5 mg tacrolimus. Would like to know if she can wait to lower dose or take once per day rather than BID d/t cost for this month. Will discuss with Dr. Chong

## 2017-09-21 NOTE — TELEPHONE ENCOUNTER
Please call pt and decrease her tacrolimus to 0.5 mg QD, have level drawn next week along with Walter Cowart MD Krull, Leisa K, RN                     Okay with once daily dosing.            Previous Messages       ----- Message -----      From: Liz Zarate, RN      Sent: 9/20/2017   8:50 AM        To: Walter Chong MD   Subject: tacrolimus                                       Her tac level is 9.1 on 0.5 mg BID. She just called back about our phone call to lower her dose and she is wondering if you're okay waiting to lower dose until next month or if she can take it once per day for this month d/t cost because she just picked up her new Rx?   Thanks   Liz

## 2017-09-22 RX ORDER — TACROLIMUS 0.5 MG/1
0.5 CAPSULE, GELATIN COATED ORAL DAILY
Qty: 30 CAPSULE | Refills: 11 | Status: SHIPPED | OUTPATIENT
Start: 2017-09-22 | End: 2017-10-30

## 2017-09-22 NOTE — TELEPHONE ENCOUNTER
Spoke to patient regarding tacrolimus dose.  Patient verbalizes understanding of medication dose decrease to 0.5 mg QD per Dr. Chong.  Patient will repeat labs 9/30/17.

## 2017-10-07 ENCOUNTER — RESULTS ONLY (OUTPATIENT)
Dept: OTHER | Facility: CLINIC | Age: 42
End: 2017-10-07

## 2017-10-07 DIAGNOSIS — Z94.0 KIDNEY TRANSPLANTED: ICD-10-CM

## 2017-10-07 DIAGNOSIS — Z94.0 KIDNEY REPLACED BY TRANSPLANT: ICD-10-CM

## 2017-10-07 DIAGNOSIS — Z48.298 AFTERCARE FOLLOWING ORGAN TRANSPLANT: ICD-10-CM

## 2017-10-07 LAB
ANION GAP SERPL CALCULATED.3IONS-SCNC: 7 MMOL/L (ref 3–14)
BUN SERPL-MCNC: 22 MG/DL (ref 7–30)
CALCIUM SERPL-MCNC: 9.2 MG/DL (ref 8.5–10.1)
CHLORIDE SERPL-SCNC: 106 MMOL/L (ref 94–109)
CO2 SERPL-SCNC: 25 MMOL/L (ref 20–32)
CREAT SERPL-MCNC: 1.01 MG/DL (ref 0.52–1.04)
CREAT UR-MCNC: 73 MG/DL
ERYTHROCYTE [DISTWIDTH] IN BLOOD BY AUTOMATED COUNT: 11.9 % (ref 10–15)
GFR SERPL CREATININE-BSD FRML MDRD: 59 ML/MIN/1.7M2
GLUCOSE SERPL-MCNC: 80 MG/DL (ref 70–99)
HCT VFR BLD AUTO: 36.3 % (ref 35–47)
HGB BLD-MCNC: 12.5 G/DL (ref 11.7–15.7)
MCH RBC QN AUTO: 34.2 PG (ref 26.5–33)
MCHC RBC AUTO-ENTMCNC: 34.4 G/DL (ref 31.5–36.5)
MCV RBC AUTO: 100 FL (ref 78–100)
PLATELET # BLD AUTO: 257 10E9/L (ref 150–450)
POTASSIUM SERPL-SCNC: 3.9 MMOL/L (ref 3.4–5.3)
PROT UR-MCNC: 0.1 G/L
PROT/CREAT 24H UR: 0.14 G/G CR (ref 0–0.2)
RBC # BLD AUTO: 3.65 10E12/L (ref 3.8–5.2)
SODIUM SERPL-SCNC: 138 MMOL/L (ref 133–144)
TACROLIMUS BLD-MCNC: <3 UG/L (ref 5–15)
TME LAST DOSE: ABNORMAL H
WBC # BLD AUTO: 4.8 10E9/L (ref 4–11)

## 2017-10-10 ENCOUNTER — TELEPHONE (OUTPATIENT)
Dept: TRANSPLANT | Facility: CLINIC | Age: 42
End: 2017-10-10

## 2017-10-10 DIAGNOSIS — Z94.0 KIDNEY REPLACED BY TRANSPLANT: Primary | ICD-10-CM

## 2017-10-10 LAB
EBV DNA # SPEC NAA+PROBE: ABNORMAL {COPIES}/ML
EBV DNA SPEC NAA+PROBE-LOG#: 4.9 {LOG_COPIES}/ML

## 2017-10-10 NOTE — TELEPHONE ENCOUNTER
Tacrolimus <3  Please call pt to confirm she is taking tacrolimus 0.5 mg Daily. Repeat labs next week.   Please add IgG level per Dr. Chong request.     Notes Recorded by Walter Chong MD on 10/10/2017 at 11:29 AM  Mostly stable EBV viremia.  Recommend checking IgG level and would continue on present immunosuppression

## 2017-10-12 ENCOUNTER — MYC MEDICAL ADVICE (OUTPATIENT)
Dept: TRANSPLANT | Facility: CLINIC | Age: 42
End: 2017-10-12

## 2017-10-12 NOTE — TELEPHONE ENCOUNTER
Left message for patient and sent MyChart message asking to call back and confirm dose change and that she will get labs next week.

## 2017-10-21 DIAGNOSIS — Z94.0 KIDNEY REPLACED BY TRANSPLANT: ICD-10-CM

## 2017-10-22 LAB
TACROLIMUS BLD-MCNC: <3 UG/L (ref 5–15)
TME LAST DOSE: ABNORMAL H

## 2017-10-23 ENCOUNTER — TELEPHONE (OUTPATIENT)
Dept: TRANSPLANT | Facility: CLINIC | Age: 42
End: 2017-10-23

## 2017-10-23 LAB — CMV IGG SERPL QL IA: >8 AI (ref 0–0.8)

## 2017-10-23 NOTE — TELEPHONE ENCOUNTER
ISSUE: tacrolimus = <3.0   Left VM, instructed to call back to discuss low level.  MyChart Message sent as well.

## 2017-10-24 LAB
PROTOCOL CUTOFF: NORMAL
UNACCEPTABLE ANTIGEN: NORMAL

## 2017-10-25 ENCOUNTER — TELEPHONE (OUTPATIENT)
Dept: TRANSPLANT | Facility: CLINIC | Age: 42
End: 2017-10-25

## 2017-10-25 DIAGNOSIS — Z94.0 KIDNEY TRANSPLANTED: Primary | ICD-10-CM

## 2017-10-25 NOTE — TELEPHONE ENCOUNTER
ISSUE:  Tacrolimus level <3.0 (<3.0, 9.1), s/b 3-5, dose 0.5 mg QD      PLAN:   Call to patient: VM left requesting callback. Txp office has been trying to get a hold of pt to discuss low levels since 10/10.      OUTCOME:   Please send pt letter stating we are trying to get a hold of her.

## 2017-10-26 NOTE — TELEPHONE ENCOUNTER
Spoke with pt.   Confirmed dose 0.5 mg tacrolimus DAILY.   Last tacrolimus level was ~28 hours.   Discussed with pt the importance of a good trough. She voiced understanding. Will repeat labs in the next few days. Pt will try to get it done over the weekend if her  can give her a ride.

## 2017-10-27 LAB
DONOR IDENTIFICATION: NORMAL
DSA COMMENTS: NORMAL
DSA PRESENT: NO
DSA TEST METHOD: NORMAL
ORGAN: NORMAL
SA1 CELL: NORMAL
SA1 COMMENTS: NORMAL
SA1 HI RISK ABY: NORMAL
SA1 MOD RISK ABY: NORMAL
SA1 TEST METHOD: NORMAL
SA2 CELL: NORMAL
SA2 COMMENTS: NORMAL
SA2 HI RISK ABY UA: NORMAL
SA2 MOD RISK ABY: NORMAL
SA2 TEST METHOD: NORMAL

## 2017-10-28 DIAGNOSIS — Z94.0 KIDNEY REPLACED BY TRANSPLANT: ICD-10-CM

## 2017-10-28 DIAGNOSIS — Z48.298 AFTERCARE FOLLOWING ORGAN TRANSPLANT: ICD-10-CM

## 2017-10-28 DIAGNOSIS — Z94.0 KIDNEY TRANSPLANTED: ICD-10-CM

## 2017-10-28 LAB
ANION GAP SERPL CALCULATED.3IONS-SCNC: 7 MMOL/L (ref 3–14)
BUN SERPL-MCNC: 20 MG/DL (ref 7–30)
CALCIUM SERPL-MCNC: 9 MG/DL (ref 8.5–10.1)
CHLORIDE SERPL-SCNC: 105 MMOL/L (ref 94–109)
CO2 SERPL-SCNC: 26 MMOL/L (ref 20–32)
CREAT SERPL-MCNC: 1.07 MG/DL (ref 0.52–1.04)
ERYTHROCYTE [DISTWIDTH] IN BLOOD BY AUTOMATED COUNT: 11.8 % (ref 10–15)
GFR SERPL CREATININE-BSD FRML MDRD: 56 ML/MIN/1.7M2
GLUCOSE SERPL-MCNC: 77 MG/DL (ref 70–99)
HCT VFR BLD AUTO: 37.3 % (ref 35–47)
HGB BLD-MCNC: 12.6 G/DL (ref 11.7–15.7)
MCH RBC QN AUTO: 33.8 PG (ref 26.5–33)
MCHC RBC AUTO-ENTMCNC: 33.8 G/DL (ref 31.5–36.5)
MCV RBC AUTO: 100 FL (ref 78–100)
PLATELET # BLD AUTO: 265 10E9/L (ref 150–450)
POTASSIUM SERPL-SCNC: 3.9 MMOL/L (ref 3.4–5.3)
RBC # BLD AUTO: 3.73 10E12/L (ref 3.8–5.2)
SODIUM SERPL-SCNC: 138 MMOL/L (ref 133–144)
WBC # BLD AUTO: 5 10E9/L (ref 4–11)

## 2017-10-29 LAB
TACROLIMUS BLD-MCNC: <3 UG/L (ref 5–15)
TME LAST DOSE: ABNORMAL H

## 2017-10-30 ENCOUNTER — TELEPHONE (OUTPATIENT)
Dept: TRANSPLANT | Facility: CLINIC | Age: 42
End: 2017-10-30

## 2017-10-30 DIAGNOSIS — Z94.0 KIDNEY TRANSPLANTED: Primary | ICD-10-CM

## 2017-10-30 LAB
BKV DNA # SPEC NAA+PROBE: NORMAL COPIES/ML
BKV DNA SPEC NAA+PROBE-LOG#: NORMAL LOG COPIES/ML
EBV DNA # SPEC NAA+PROBE: ABNORMAL {COPIES}/ML
EBV DNA SPEC NAA+PROBE-LOG#: 4.5 {LOG_COPIES}/ML
SPECIMEN SOURCE: NORMAL

## 2017-10-30 NOTE — TELEPHONE ENCOUNTER
Prior Authorization Specialty Medication Request    Medication/Dose: Envarsus 0.75 mg   Diagnosis and ICD: Kidney transplant Z94.0  New/Renewal/Insurance Change PA: New medication    Previously Tried and Failed Therapies: prograf tacrolimus    Rationale: labile tacrolimus levels on prograf.       Pharmacy Name:[facility]  Pharmacy #:[phone]

## 2017-10-30 NOTE — TELEPHONE ENCOUNTER
Tacrolimus <3.   Verified close to 12 hour trough. Confirmed dose 0.5 mg DAILY.   Plan per Dr. Chong: switch pt to Envarsus. 0.75 mg daily. Repeat labs next week.   Pt voiced understanding of plan.

## 2017-10-31 NOTE — TELEPHONE ENCOUNTER
Updated Nena on PA for envarsus.   She will take tacrolimus 1 mg in AM and 0.5 mg in PM for now. She has concerns about cost, so if insurance approves will discuss cost and see if there is some co-pay assistance.

## 2017-10-31 NOTE — TELEPHONE ENCOUNTER
PA Initiation    Medication: envarsus  Insurance Company: [employer] - Phone [phone] Fax [phone]  Pharmacy Filling the Rx:    Filling Pharmacy Phone:    Filling Pharmacy Fax:    Start Date: 10/31/2017    [facility] Authorization Team   Phone: [phone]  Fax: [phone]

## 2017-11-01 NOTE — TELEPHONE ENCOUNTER
Patient Call: Insurance  Reason for Call: Medication Coverage and need order for the tacrolimus fax [phone]. Please call [facility] at [phone]-[employer]

## 2017-11-02 NOTE — TELEPHONE ENCOUNTER
Prior Authorization Approval    Authorization Effective Date: 11/1/2017  Authorization Expiration Date: 11/1/2018  Medication: envarsus  Approved Dose/Quantity: 30  Reference #: (KEY: [id number]   Insurance Company: [employer] - Phone [phone] Fax [phone]  Expected CoPay:       CoPay Card Available:      Foundation Assistance Needed:    Which Pharmacy is filling the prescription (Not needed for infusion/clinic administered):    Pharmacy Notified:    Patient Notified:          [facility] Prior Authorization Team   Phone: [phone]  Fax: [phone]

## 2017-11-08 ENCOUNTER — TELEPHONE (OUTPATIENT)
Dept: TRANSPLANT | Facility: CLINIC | Age: 42
End: 2017-11-08

## 2017-11-09 NOTE — TELEPHONE ENCOUNTER
Patient Call: Transplant Lab  Reason for call: Clarification   Please call Nena before 1:00pm today 11/09/17

## 2017-11-11 DIAGNOSIS — Z94.0 KIDNEY REPLACED BY TRANSPLANT: ICD-10-CM

## 2017-11-11 DIAGNOSIS — Z48.298 AFTERCARE FOLLOWING ORGAN TRANSPLANT: ICD-10-CM

## 2017-11-11 DIAGNOSIS — Z94.0 KIDNEY TRANSPLANTED: ICD-10-CM

## 2017-11-11 LAB
ANION GAP SERPL CALCULATED.3IONS-SCNC: 5 MMOL/L (ref 3–14)
BUN SERPL-MCNC: 16 MG/DL (ref 7–30)
CALCIUM SERPL-MCNC: 8.8 MG/DL (ref 8.5–10.1)
CHLORIDE SERPL-SCNC: 106 MMOL/L (ref 94–109)
CO2 SERPL-SCNC: 25 MMOL/L (ref 20–32)
CREAT SERPL-MCNC: 1.07 MG/DL (ref 0.52–1.04)
ERYTHROCYTE [DISTWIDTH] IN BLOOD BY AUTOMATED COUNT: 11.8 % (ref 10–15)
GFR SERPL CREATININE-BSD FRML MDRD: 56 ML/MIN/1.7M2
GLUCOSE SERPL-MCNC: 126 MG/DL (ref 70–99)
HCT VFR BLD AUTO: 37.5 % (ref 35–47)
HGB BLD-MCNC: 12.7 G/DL (ref 11.7–15.7)
MCH RBC QN AUTO: 34 PG (ref 26.5–33)
MCHC RBC AUTO-ENTMCNC: 33.9 G/DL (ref 31.5–36.5)
MCV RBC AUTO: 101 FL (ref 78–100)
PLATELET # BLD AUTO: 231 10E9/L (ref 150–450)
POTASSIUM SERPL-SCNC: 4.3 MMOL/L (ref 3.4–5.3)
RBC # BLD AUTO: 3.73 10E12/L (ref 3.8–5.2)
SODIUM SERPL-SCNC: 136 MMOL/L (ref 133–144)
WBC # BLD AUTO: 3.5 10E9/L (ref 4–11)

## 2017-11-12 LAB
TACROLIMUS BLD-MCNC: 5 UG/L (ref 5–15)
TME LAST DOSE: 2030 H

## 2017-11-24 ENCOUNTER — MYC MEDICAL ADVICE (OUTPATIENT)
Dept: NEPHROLOGY | Facility: CLINIC | Age: 42
End: 2017-11-24

## 2017-11-27 NOTE — TELEPHONE ENCOUNTER
mychart message sent from Dr. Chong's RN team that pt will be out of prograf today.   Sent Nena a [employer] message clarifying medication and dose. She should be taking envarsus 0.75mg Daily, however from her MyChart it seems as if she is not taking that yet. [facility] should have sent the Rx last month.   I also attempted to call pt to clarify what she is taking. No answer. Left her a voicemail asking for a call back re:medication.

## 2017-11-28 NOTE — TELEPHONE ENCOUNTER
Spoke with Nena.   She reports that [facility] told her the only Rx they have is for prograf 0.5 mg daily.   Pt should be taking ENVARSUS 0.75 mg daily.     Called [facility]. Spoke with several people re: tacrolimus Rx. They sent her prograf 0.5 mg daily Rx out to be delivered tomorrow. Asked that they d/c this Rx and send her envarsus today. PA was completed on 11/1/2017 and pt is approved for envarsus for 1 year. Rx for prograf cancelled. Envarsus sent overnight to pt.     Attempted to let Nena know this. No answer. Left vm explaining envarsus will be delivered tomorrow. Pt to take 0.75 mg every morning. Labs to be drawn next week. Asked pt to call back SOT office to confirm.     11/30/17: called pt again to see if she'd gotten envarsus and to remind her to get labs next week. No answer. Left message asking for call back to SOT office to confirm plan

## 2017-11-29 NOTE — TELEPHONE ENCOUNTER
Left message for patient wanting to confirm if Envarsus arrived today. Asked patient to call back and confirm.

## 2017-12-16 DIAGNOSIS — Z94.0 KIDNEY TRANSPLANTED: ICD-10-CM

## 2017-12-16 DIAGNOSIS — Z48.298 AFTERCARE FOLLOWING ORGAN TRANSPLANT: ICD-10-CM

## 2017-12-16 DIAGNOSIS — Z94.0 KIDNEY REPLACED BY TRANSPLANT: ICD-10-CM

## 2017-12-16 LAB
ANION GAP SERPL CALCULATED.3IONS-SCNC: 8 MMOL/L (ref 3–14)
BUN SERPL-MCNC: 22 MG/DL (ref 7–30)
CALCIUM SERPL-MCNC: 8.7 MG/DL (ref 8.5–10.1)
CHLORIDE SERPL-SCNC: 108 MMOL/L (ref 94–109)
CO2 SERPL-SCNC: 26 MMOL/L (ref 20–32)
CREAT SERPL-MCNC: 1.07 MG/DL (ref 0.52–1.04)
ERYTHROCYTE [DISTWIDTH] IN BLOOD BY AUTOMATED COUNT: 11.6 % (ref 10–15)
GFR SERPL CREATININE-BSD FRML MDRD: 56 ML/MIN/1.7M2
GLUCOSE SERPL-MCNC: 144 MG/DL (ref 70–99)
HCT VFR BLD AUTO: 40 % (ref 35–47)
HGB BLD-MCNC: 13.4 G/DL (ref 11.7–15.7)
MCH RBC QN AUTO: 33.3 PG (ref 26.5–33)
MCHC RBC AUTO-ENTMCNC: 33.5 G/DL (ref 31.5–36.5)
MCV RBC AUTO: 99 FL (ref 78–100)
PLATELET # BLD AUTO: 256 10E9/L (ref 150–450)
POTASSIUM SERPL-SCNC: 4.2 MMOL/L (ref 3.4–5.3)
RBC # BLD AUTO: 4.03 10E12/L (ref 3.8–5.2)
SODIUM SERPL-SCNC: 141 MMOL/L (ref 133–144)
TACROLIMUS BLD-MCNC: <3 UG/L (ref 5–15)
TME LAST DOSE: ABNORMAL H
WBC # BLD AUTO: 3.9 10E9/L (ref 4–11)

## 2017-12-18 ENCOUNTER — TELEPHONE (OUTPATIENT)
Dept: TRANSPLANT | Facility: CLINIC | Age: 42
End: 2017-12-18

## 2017-12-18 DIAGNOSIS — Z94.0 KIDNEY REPLACED BY TRANSPLANT: Primary | ICD-10-CM

## 2017-12-18 LAB
BKV DNA # SPEC NAA+PROBE: NORMAL COPIES/ML
BKV DNA SPEC NAA+PROBE-LOG#: NORMAL LOG COPIES/ML
EBV DNA # SPEC NAA+PROBE: 8114 {COPIES}/ML
EBV DNA SPEC NAA+PROBE-LOG#: 3.9 {LOG_COPIES}/ML
SPECIMEN SOURCE: NORMAL

## 2017-12-18 NOTE — TELEPHONE ENCOUNTER
Tacrolimus <3.  Please call to confirm this was a good 24 hour level. Pt is on Envarsus 0.75 mg daily. She needs to repeat level this week.

## 2017-12-18 NOTE — TELEPHONE ENCOUNTER
Left detailed message for patient explaining need to repeat tacrolimus level and asked she call back to confirm.

## 2017-12-21 ENCOUNTER — TELEPHONE (OUTPATIENT)
Dept: TRANSPLANT | Facility: CLINIC | Age: 42
End: 2017-12-21

## 2017-12-21 NOTE — TELEPHONE ENCOUNTER
Left patient another message and sent MyChart message as well asking to call back and discuss low level and need for repeat ASAP.

## 2017-12-21 NOTE — TELEPHONE ENCOUNTER
Patient Call: Transplant Lab  Reason for call: Clarification  Nena called back;  She has lab appt., [facility] 12/26/17 will recheck her Prograf

## 2017-12-26 DIAGNOSIS — Z94.0 KIDNEY REPLACED BY TRANSPLANT: ICD-10-CM

## 2017-12-26 PROCEDURE — 80197 ASSAY OF TACROLIMUS: CPT | Performed by: INTERNAL MEDICINE

## 2017-12-26 PROCEDURE — 36415 COLL VENOUS BLD VENIPUNCTURE: CPT | Performed by: INTERNAL MEDICINE

## 2017-12-28 LAB
TACROLIMUS BLD-MCNC: 3.5 UG/L (ref 5–15)
TME LAST DOSE: 930 H

## 2018-01-04 ENCOUNTER — TELEPHONE (OUTPATIENT)
Dept: TRANSPLANT | Facility: CLINIC | Age: 43
End: 2018-01-04

## 2018-01-04 NOTE — TELEPHONE ENCOUNTER
Transplant Social Work Services Phone Call      Data: Patient called asking for assistance for Envarsus.  Intervention: Patient has had copay cards in the past and asked for assistance with copays.  Assessment: Patient continues to have private insurance.  Applied for Envarsus copay card and with patient's permission, emailed the card to her and also reimbursement paperwork if her pharmacy will not accept.  Plan: SW will continue to be available if further needs are identifiedl

## 2018-01-06 DIAGNOSIS — T86.10 COMPLICATION OF TRANSPLANTED KIDNEY: ICD-10-CM

## 2018-01-06 DIAGNOSIS — Z94.0 KIDNEY TRANSPLANTED: ICD-10-CM

## 2018-01-06 DIAGNOSIS — Z48.298 AFTERCARE FOLLOWING ORGAN TRANSPLANT: ICD-10-CM

## 2018-01-06 DIAGNOSIS — Z94.0 KIDNEY REPLACED BY TRANSPLANT: ICD-10-CM

## 2018-01-06 LAB
ALBUMIN UR-MCNC: NEGATIVE MG/DL
ANION GAP SERPL CALCULATED.3IONS-SCNC: 6 MMOL/L (ref 3–14)
APPEARANCE UR: ABNORMAL
BILIRUB UR QL STRIP: NEGATIVE
BUN SERPL-MCNC: 25 MG/DL (ref 7–30)
CALCIUM SERPL-MCNC: 8.9 MG/DL (ref 8.5–10.1)
CHLORIDE SERPL-SCNC: 107 MMOL/L (ref 94–109)
CO2 SERPL-SCNC: 26 MMOL/L (ref 20–32)
COLOR UR AUTO: YELLOW
CREAT SERPL-MCNC: 0.9 MG/DL (ref 0.52–1.04)
ERYTHROCYTE [DISTWIDTH] IN BLOOD BY AUTOMATED COUNT: 11.5 % (ref 10–15)
GFR SERPL CREATININE-BSD FRML MDRD: 68 ML/MIN/1.7M2
GLUCOSE SERPL-MCNC: 82 MG/DL (ref 70–99)
GLUCOSE UR STRIP-MCNC: NEGATIVE MG/DL
HCT VFR BLD AUTO: 39.5 % (ref 35–47)
HGB BLD-MCNC: 13.5 G/DL (ref 11.7–15.7)
HGB UR QL STRIP: ABNORMAL
KETONES UR STRIP-MCNC: NEGATIVE MG/DL
LEUKOCYTE ESTERASE UR QL STRIP: NEGATIVE
MCH RBC QN AUTO: 33.3 PG (ref 26.5–33)
MCHC RBC AUTO-ENTMCNC: 34.2 G/DL (ref 31.5–36.5)
MCV RBC AUTO: 97 FL (ref 78–100)
NITRATE UR QL: NEGATIVE
PH UR STRIP: 5 PH (ref 5–7)
PLATELET # BLD AUTO: 270 10E9/L (ref 150–450)
POTASSIUM SERPL-SCNC: 3.8 MMOL/L (ref 3.4–5.3)
RBC # BLD AUTO: 4.06 10E12/L (ref 3.8–5.2)
RBC #/AREA URNS AUTO: 2 /HPF (ref 0–2)
SODIUM SERPL-SCNC: 139 MMOL/L (ref 133–144)
SOURCE: ABNORMAL
SP GR UR STRIP: 1.02 (ref 1–1.03)
SQUAMOUS #/AREA URNS AUTO: 2 /HPF (ref 0–1)
UROBILINOGEN UR STRIP-MCNC: 0 MG/DL (ref 0–2)
WBC # BLD AUTO: 6 10E9/L (ref 4–11)
WBC #/AREA URNS AUTO: 1 /HPF (ref 0–2)

## 2018-01-07 LAB
TACROLIMUS BLD-MCNC: <3 UG/L (ref 5–15)
TME LAST DOSE: ABNORMAL H

## 2018-01-08 ENCOUNTER — TELEPHONE (OUTPATIENT)
Dept: TRANSPLANT | Facility: CLINIC | Age: 43
End: 2018-01-08

## 2018-01-08 NOTE — TELEPHONE ENCOUNTER
Tacrolimus <3. Goal 3-5  Call to pt to confirm this was a good trough and that she has not missed any doses. Confirm 0.75 mg Envarsus. No answer, left vm asking for call back

## 2018-01-09 ENCOUNTER — OFFICE VISIT (OUTPATIENT)
Dept: NEPHROLOGY | Facility: CLINIC | Age: 43
End: 2018-01-09
Attending: INTERNAL MEDICINE
Payer: OTHER GOVERNMENT

## 2018-01-09 VITALS
TEMPERATURE: 98 F | SYSTOLIC BLOOD PRESSURE: 119 MMHG | HEIGHT: 60 IN | WEIGHT: 106.4 LBS | OXYGEN SATURATION: 100 % | HEART RATE: 76 BPM | BODY MASS INDEX: 20.89 KG/M2 | DIASTOLIC BLOOD PRESSURE: 82 MMHG

## 2018-01-09 DIAGNOSIS — Z94.0 KIDNEY REPLACED BY TRANSPLANT: Primary | ICD-10-CM

## 2018-01-09 DIAGNOSIS — D84.9 IMMUNOSUPPRESSED STATUS (H): ICD-10-CM

## 2018-01-09 DIAGNOSIS — B27.00 EBV (EPSTEIN-BARR VIRUS) VIREMIA: ICD-10-CM

## 2018-01-09 DIAGNOSIS — Z23 ENCOUNTER FOR IMMUNIZATION: ICD-10-CM

## 2018-01-09 DIAGNOSIS — Z48.298 AFTERCARE FOLLOWING ORGAN TRANSPLANT: ICD-10-CM

## 2018-01-09 LAB
EBV DNA # SPEC NAA+PROBE: 9163 {COPIES}/ML
EBV DNA SPEC NAA+PROBE-LOG#: 4 {LOG_COPIES}/ML

## 2018-01-09 PROCEDURE — G0008 ADMIN INFLUENZA VIRUS VAC: HCPCS | Mod: ZF

## 2018-01-09 PROCEDURE — G0463 HOSPITAL OUTPT CLINIC VISIT: HCPCS | Mod: ZF

## 2018-01-09 PROCEDURE — 25000128 H RX IP 250 OP 636: Mod: ZF | Performed by: INTERNAL MEDICINE

## 2018-01-09 PROCEDURE — 90686 IIV4 VACC NO PRSV 0.5 ML IM: CPT | Mod: ZF | Performed by: INTERNAL MEDICINE

## 2018-01-09 RX ADMIN — INFLUENZA A VIRUS A/MICHIGAN/45/2015 X-275 (H1N1) ANTIGEN (FORMALDEHYDE INACTIVATED), INFLUENZA A VIRUS A/HONG KONG/4801/2014 X-263B (H3N2) ANTIGEN (FORMALDEHYDE INACTIVATED), INFLUENZA B VIRUS B/PHUKET/3073/2013 ANTIGEN (FORMALDEHYDE INACTIVATED), AND INFLUENZA B VIRUS B/BRISBANE/60/2008 ANTIGEN (FORMALDEHYDE INACTIVATED) 0.5 ML: 15; 15; 15; 15 INJECTION, SUSPENSION INTRAMUSCULAR at 17:04

## 2018-01-09 ASSESSMENT — PAIN SCALES - GENERAL: PAINLEVEL: NO PAIN (0)

## 2018-01-09 NOTE — PROGRESS NOTES
Assessment and Plan:  1. LDKT - baseline Cr ~ 0.9-1.0, which has remained stable.  Normal proteinuria.  Low, undetectable tacrolimus levels with last check and will increase tacrolimus extended release to 1 mg daily.  Tacrolimus goal 3-5.  Will make no other changes in immunosuppression.  2. BP - well controlled at target of less than 140/90 off antihypertensive medications.  No changes.  3. Recurrent UTIs - no recent infection.  4. EBV viremia - stable, low level EBV viremia in 8-10K range over the last couple of checks.  Unremarkable CT chest/abd/pelvis 7/2017 without evidence for lymphoma.  Will continue on lower immunosuppression.  5. Skin cancer risk - no new skin lesions.  Recommend regular follow up with Dermatology.  6. Recommend return visit in 6 months.    Assessment and plan was discussed with patient and she voiced her understanding and agreement.    Reason for Visit:  Ms. Underwood is here for routine follow up.    HPI:   Nena SUZY Underwood is a 42 year old female with ESKD from unknown etiology and is status post LDKT on 6/23/10.         Transplant Hx:       Tx: LDKT  Date: 6/23/10       Present Maintenance IS: Tacrolimus extended release (Envarsus XR), Azathioprine and Prednisone       Baseline Creatinine: 0.9-1.0    Ms. Underwood reports feeling good overall with minimal medical complaints.  Her energy level remains good and has been normal.  She is active and does get some exercise.  Denies any chest pain or shortness of breath with exertion.  Appetite is good and she has gained about 10 lbs.  No nausea, vomiting or diarrhea.  No fever, sweats or chills.  No leg swelling.  No pain or burning with urination.    Home BP: 110-120/70-80s.      ROS:   A comprehensive review of systems was obtained and negative, except as noted in the HPI or PMH.    Active Medical Problems:  Patient Active Problem List   Diagnosis     Kidney replaced by transplant     Aftercare following organ transplant     CARDIOVASCULAR  SCREENING; LDL GOAL LESS THAN 100     Immunosuppressed status (H)     Moraxella catarrhalis pneumonia (H)     Pyelonephritis     Cervical cancer screening     History of recurrent urinary tract infection     EBV (Jaime-Barr virus) viremia     Personal Hx:  Social History     Social History     Marital status:      Spouse name: N/A     Number of children: N/A     Years of education: N/A     Occupational History           [employer]     Social History Main Topics     Smoking status: Never Smoker     Smokeless tobacco: Never Used     Alcohol use No     Drug use: No     Sexual activity: Yes     Partners: Male     Other Topics Concern      Service No     Blood Transfusions No     Weight Concern No     Exercise No     Bike Helmet Not Asked     na     Seat Belt Yes     Social History Narrative    Ms. Underwood emigrated from the [place] in May, [place]. She is currently living with her  in [place], [place].  She works for the [email or web address] in [place]      Allergies:  Allergies   Allergen Reactions     Nkda [No Known Drug Allergies]      Medications:  Prior to Admission medications    Medication Sig Start Date End Date Taking? Authorizing Provider   azaTHIOprine (IMURAN) 50 MG tablet Take 2 tablets by mouth daily. 6/21/12  Yes Walter Chong MD   predniSONE (DELTASONE) 5 MG tablet Take 1 tablet by mouth daily. 5/18/12  Yes Kong Renteria MD   PROGRAF 0.5 MG capsule Take 1 capsule by mouth 2 times daily. 3/5/12  Yes Kong Renteria MD     Vitals:  /82  Pulse 76  Temp 98  F (36.7  C) (Oral)  Ht 1.524 m (5')  Wt 48.3 kg (106 lb 6.4 oz)  SpO2 100%  BMI 20.78 kg/m2    Exam:   GENERAL APPEARANCE: alert and no distress  HENT: mouth without ulcers or lesions  LYMPHATICS: no cervical or supraclavicular nodes  RESP: lungs clear to auscultation - no rales, rhonchi or wheezes  CV: regular rhythm, normal rate, no rub, no murmur  EDEMA: no LE  edema bilaterally  ABDOMEN: soft, nontender, bowel sounds normal  MS: extremities normal - no gross deformities noted, no evidence of inflammation in joints, no muscle tenderness  SKIN: no rash  TX KIDNEY: normal    Results:   Recent Results (from the past 504 hour(s))   Tacrolimus level    Collection Time: 12/26/17  9:16 AM   Result Value Ref Range    Tacrolimus Last Dose 930     Tacrolimus Level 3.5 (L) 5.0 - 15.0 ug/L   CBC with platelets    Collection Time: 01/06/18 12:09 PM   Result Value Ref Range    WBC 6.0 4.0 - 11.0 10e9/L    RBC Count 4.06 3.8 - 5.2 10e12/L    Hemoglobin 13.5 11.7 - 15.7 g/dL    Hematocrit 39.5 35.0 - 47.0 %    MCV 97 78 - 100 fl    MCH 33.3 (H) 26.5 - 33.0 pg    MCHC 34.2 31.5 - 36.5 g/dL    RDW 11.5 10.0 - 15.0 %    Platelet Count 270 150 - 450 10e9/L   Basic metabolic panel    Collection Time: 01/06/18 12:09 PM   Result Value Ref Range    Sodium 139 133 - 144 mmol/L    Potassium 3.8 3.4 - 5.3 mmol/L    Chloride 107 94 - 109 mmol/L    Carbon Dioxide 26 20 - 32 mmol/L    Anion Gap 6 3 - 14 mmol/L    Glucose 82 70 - 99 mg/dL    Urea Nitrogen 25 7 - 30 mg/dL    Creatinine 0.90 0.52 - 1.04 mg/dL    GFR Estimate 68 >60 mL/min/1.7m2    GFR Estimate If Black 83 >60 mL/min/1.7m2    Calcium 8.9 8.5 - 10.1 mg/dL   Tacrolimus level    Collection Time: 01/06/18 12:09 PM   Result Value Ref Range    Tacrolimus Last Dose 01/05/18  1330     Tacrolimus Level <3.0 (L) 5.0 - 15.0 ug/L   EBV DNA PCR Quantitative Whole Blood    Collection Time: 01/06/18 12:09 PM   Result Value Ref Range    EBV DNA Copies/mL 9163 (A) EBVNEG^EBV DNA Not Detected [Copies]/mL    EBV DNA Log of Copies 4.0 (H) <2.7 [Log_copies]/mL   BK virus PCR quantitative    Collection Time: 01/06/18 12:10 PM   Result Value Ref Range    BK Virus Specimen Plasma     BK Virus Result BK Virus DNA Not Detected BKNEG^BK Virus DNA Not Detected copies/mL    BK Virus Log Not Calculated <2.7 Log copies/mL   Routine UA with micro reflex to culture     Collection Time: 01/06/18 12:35 PM   Result Value Ref Range    Color Urine Yellow     Appearance Urine Slightly Cloudy     Glucose Urine Negative NEG^Negative mg/dL    Bilirubin Urine Negative NEG^Negative    Ketones Urine Negative NEG^Negative mg/dL    Specific Gravity Urine 1.021 1.003 - 1.035    Blood Urine Moderate (A) NEG^Negative    pH Urine 5.0 5.0 - 7.0 pH    Protein Albumin Urine Negative NEG^Negative mg/dL    Urobilinogen mg/dL 0.0 0.0 - 2.0 mg/dL    Nitrite Urine Negative NEG^Negative    Leukocyte Esterase Urine Negative NEG^Negative    Source Midstream Urine     WBC Urine 1 0 - 2 /HPF    RBC Urine 2 0 - 2 /HPF    Squamous Epithelial /HPF Urine 2 (H) 0 - 1 /HPF

## 2018-01-09 NOTE — MR AVS SNAPSHOT
After Visit Summary   1/9/2018    Nena Underwood    MRN: [id number]           Patient Information     Date Of Birth          1975        Visit Information        Provider Department      1/9/2018 4:50 PM Walter Chong MD [facility]        Today's Diagnoses     Kidney replaced by transplant    -  1    Encounter for immunization        Aftercare following organ transplant        EBV (Jaime-Barr virus) viremia        Immunosuppressed status (H)          Care Instructions    Increase tacrolimus (Envarsus XR) dose to 1 mg daily.          Follow-ups after your visit        Follow-up notes from your care team     Return in about 6 months (around 7/9/2018).      Your next 10 appointments already scheduled     Jul 17, 2018  4:50 PM CDT   (Arrive by 4:20 PM)   Return Kidney Transplant with Walter Chong MD   [facility] ([facility])    [address] [place] [address]  [address]  [facility] [address] [phone]              Who to contact     If you have questions or need follow up information about today's clinic visit or your schedule please contact [facility] directly at [phone].  Normal or non-critical lab and imaging results will be communicated to you by G3hart, letter or phone within 4 business days after the clinic has received the results. If you do not hear from us within 7 days, please contact the clinic through Zeviat or phone. If you have a critical or abnormal lab result, we will notify you by phone as soon as possible.  Submit refill requests through [employer] or call your pharmacy and they will forward the refill request to us. Please allow 3 business days for your refill to be completed.          Additional Information About Your Visit        G3hart Information     [employer] gives you secure access to your electronic health record. If you see a primary care provider, you can also send messages to your care team and  make appointments. If you have questions, please call your primary care clinic.  If you do not have a primary care provider, please call [phone] and they will assist you.        Care EveryWhere ID     This is your Care EveryWhere ID. This could be used by other organizations to access your [place] medical records  [id number]        Your Vitals Were     Pulse Temperature Height Pulse Oximetry BMI (Body Mass Index)       76 98  F (36.7  C) (Oral) 1.524 m (5') 100% 20.78 kg/m2        Blood Pressure from Last 3 Encounters:   01/09/18 119/82   06/20/17 100/65   05/11/17 110/70    Weight from Last 3 Encounters:   01/09/18 48.3 kg (106 lb 6.4 oz)   06/20/17 42.5 kg (93 lb 12.8 oz)   05/11/17 41.3 kg (91 lb)              Today, you had the following     No orders found for display         Today's Medication Changes          These changes are accurate as of: 1/9/18  5:07 PM.  If you have any questions, ask your nurse or doctor.               These medicines have changed or have updated prescriptions.        Dose/Directions    tacrolimus 1 MG 24 hr tablet   Commonly known as:  ENVARSUS XR   This may have changed:    - medication strength  - how much to take   Used for:  Kidney replaced by transplant   Changed by:  Walter Chong MD        Dose:  1 mg   Take 1 tablet (1 mg) by mouth every morning (before breakfast)   Quantity:  30 tablet   Refills:  11            Where to get your medicines      These medications were sent to [facility] - [place], [place] - [address] AT [address] [facility]  [address], [facility] [address]     Phone:  [phone]     tacrolimus 1 MG 24 hr tablet                Primary Care Provider Office Phone # Fax #    Jorge L Tucker [phone]       [address] [facility] [address]        Equal Access to Services     DEEDEE WILSON AH: Bessie Steel, avelino horn, qayareli anderson  devan schmidtfer kaufmanaan ah. So [facility] [phone].    ATENCIÓN: Si cbla maci, tiene a castellanos disposición servicios gratuitos de asistencia lingüística. Leena al [phone].    We comply with applicable federal civil rights laws and [place] laws. We do not discriminate on the basis of race, color, national origin, age, disability, sex, sexual orientation, or gender identity.            Thank you!     Thank you for choosing [facility]  for your care. Our goal is always to provide you with excellent care. Hearing back from our patients is one way we can continue to improve our services. Please take a few minutes to complete the written survey that you may receive in the mail after your visit with us. Thank you!             Your Updated Medication List - Protect others around you: Learn how to safely use, store and throw away your medicines at www.disposemymeds.org.          This list is accurate as of: 1/9/18  5:07 PM.  Always use your most recent med list.                   Brand Name Dispense Instructions for use Diagnosis    azaTHIOprine 50 MG tablet    IMURAN    90 tablet    Take 1 tablet (50 mg) by mouth daily    Kidney replaced by transplant       etonogestrel 68 MG Impl    IMPLANON/NEXPLANON    1 each    1 each (68 mg) by Subdermal route once for 1 dose    Insertion of implantable subdermal contraceptive       predniSONE 5 MG tablet    DELTASONE    90 tablet    Take 1 tablet (5 mg) by mouth daily    Kidney transplanted       sulfamethoxazole-trimethoprim 400-80 MG per tablet    BACTRIM/SEPTRA    90 tablet    Take 1 tablet by mouth daily    Immunosuppressed status (H)       tacrolimus 1 MG 24 hr tablet    ENVARSUS XR    30 tablet    Take 1 tablet (1 mg) by mouth every morning (before breakfast)    Kidney replaced by transplant       TYLENOL PO      Take 500 mg by mouth every 4 hours as needed for mild pain or fever

## 2018-01-09 NOTE — LETTER
1/9/2018      RE: Nena Underwood  [address]   [facility] [address]       Assessment and Plan:  1. LDKT - baseline Cr ~ 0.9-1.0, which has remained stable.  Normal proteinuria.  Low, undetectable tacrolimus levels with last check and will increase tacrolimus extended release to 1 mg daily.  Tacrolimus goal 3-5.  Will make no other changes in immunosuppression.  2. BP - well controlled at target of less than 140/90 off antihypertensive medications.  No changes.  3. Recurrent UTIs - no recent infection.  4. EBV viremia - stable, low level EBV viremia in 8-10K range over the last couple of checks.  Unremarkable CT chest/abd/pelvis 7/2017 without evidence for lymphoma.  Will continue on lower immunosuppression.  5. Skin cancer risk - no new skin lesions.  Recommend regular follow up with Dermatology.  6. Recommend return visit in 6 months.    Assessment and plan was discussed with patient and she voiced her understanding and agreement.    Reason for Visit:  Ms. Underwood is here for routine follow up.    HPI:   Nena Underwood is a 42 year old female with ESKD from unknown etiology and is status post LDKT on 6/23/10.         Transplant Hx:       Tx: LDKT  Date: 6/23/10       Present Maintenance IS: Tacrolimus extended release (Envarsus XR), Azathioprine and Prednisone       Baseline Creatinine: 0.9-1.0    Ms. Underwood reports feeling good overall with minimal medical complaints.  Her energy level remains good and has been normal.  She is active and does get some exercise.  Denies any chest pain or shortness of breath with exertion.  Appetite is good and she has gained about 10 lbs.  No nausea, vomiting or diarrhea.  No fever, sweats or chills.  No leg swelling.  No pain or burning with urination.    Home BP: 110-120/70-80s.      ROS:   A comprehensive review of systems was obtained and negative, except as noted in the HPI or PMH.    Active Medical Problems:  Patient Active Problem List   Diagnosis     Kidney  replaced by transplant     Aftercare following organ transplant     CARDIOVASCULAR SCREENING; LDL GOAL LESS THAN 100     Immunosuppressed status (H)     Moraxella catarrhalis pneumonia (H)     Pyelonephritis     Cervical cancer screening     History of recurrent urinary tract infection     EBV (Jaime-Barr virus) viremia     Personal Hx:  Social History     Social History     Marital status:      Spouse name: N/A     Number of children: N/A     Years of education: N/A     Occupational History           [employer]     Social History Main Topics     Smoking status: Never Smoker     Smokeless tobacco: Never Used     Alcohol use No     Drug use: No     Sexual activity: Yes     Partners: Male     Other Topics Concern      Service No     Blood Transfusions No     Weight Concern No     Exercise No     Bike Helmet Not Asked     na     Seat Belt Yes     Social History Narrative    Ms. Underwood emigrated from the [facility] [place] in May, [place]. She is currently living with her  in [place], [place].  She works for the [employer] in [place] [place]      Allergies:  Allergies   Allergen Reactions     Nkda [No Known Drug Allergies]      Medications:  Prior to Admission medications    Medication Sig Start Date End Date Taking? Authorizing Provider   azaTHIOprine (IMURAN) 50 MG tablet Take 2 tablets by mouth daily. 6/21/12  Yes Walter Chong MD   predniSONE (DELTASONE) 5 MG tablet Take 1 tablet by mouth daily. 5/18/12  Yes Kong Renteria MD   PROGRAF 0.5 MG capsule Take 1 capsule by mouth 2 times daily. 3/5/12  Yes Kong Renteria MD     Vitals:  /82  Pulse 76  Temp 98  F (36.7  C) (Oral)  Ht 1.524 m (5')  Wt 48.3 kg (106 lb 6.4 oz)  SpO2 100%  BMI 20.78 kg/m2    Exam:   GENERAL APPEARANCE: alert and no distress  HENT: mouth without ulcers or lesions  LYMPHATICS: no cervical or supraclavicular nodes  RESP: lungs clear to auscultation - no rales,  rhonchi or wheezes  CV: regular rhythm, normal rate, no rub, no murmur  EDEMA: no LE edema bilaterally  ABDOMEN: soft, nontender, bowel sounds normal  MS: extremities normal - no gross deformities noted, no evidence of inflammation in joints, no muscle tenderness  SKIN: no rash  TX KIDNEY: normal    Results:   Recent Results (from the past 504 hour(s))   Tacrolimus level    Collection Time: 12/26/17  9:16 AM   Result Value Ref Range    Tacrolimus Last Dose 930     Tacrolimus Level 3.5 (L) 5.0 - 15.0 ug/L   CBC with platelets    Collection Time: 01/06/18 12:09 PM   Result Value Ref Range    WBC 6.0 4.0 - 11.0 10e9/L    RBC Count 4.06 3.8 - 5.2 10e12/L    Hemoglobin 13.5 11.7 - 15.7 g/dL    Hematocrit 39.5 35.0 - 47.0 %    MCV 97 78 - 100 fl    MCH 33.3 (H) 26.5 - 33.0 pg    MCHC 34.2 31.5 - 36.5 g/dL    RDW 11.5 10.0 - 15.0 %    Platelet Count 270 150 - 450 10e9/L   Basic metabolic panel    Collection Time: 01/06/18 12:09 PM   Result Value Ref Range    Sodium 139 133 - 144 mmol/L    Potassium 3.8 3.4 - 5.3 mmol/L    Chloride 107 94 - 109 mmol/L    Carbon Dioxide 26 20 - 32 mmol/L    Anion Gap 6 3 - 14 mmol/L    Glucose 82 70 - 99 mg/dL    Urea Nitrogen 25 7 - 30 mg/dL    Creatinine 0.90 0.52 - 1.04 mg/dL    GFR Estimate 68 >60 mL/min/1.7m2    GFR Estimate If Black 83 >60 mL/min/1.7m2    Calcium 8.9 8.5 - 10.1 mg/dL   Tacrolimus level    Collection Time: 01/06/18 12:09 PM   Result Value Ref Range    Tacrolimus Last Dose 01/05/18  1330     Tacrolimus Level <3.0 (L) 5.0 - 15.0 ug/L   EBV DNA PCR Quantitative Whole Blood    Collection Time: 01/06/18 12:09 PM   Result Value Ref Range    EBV DNA Copies/mL 9163 (A) EBVNEG^EBV DNA Not Detected [Copies]/mL    EBV DNA Log of Copies 4.0 (H) <2.7 [Log_copies]/mL   BK virus PCR quantitative    Collection Time: 01/06/18 12:10 PM   Result Value Ref Range    BK Virus Specimen Plasma     BK Virus Result BK Virus DNA Not Detected BKNEG^BK Virus DNA Not Detected copies/mL    BK Virus  Log Not Calculated <2.7 Log copies/mL   Routine UA with micro reflex to culture    Collection Time: 01/06/18 12:35 PM   Result Value Ref Range    Color Urine Yellow     Appearance Urine Slightly Cloudy     Glucose Urine Negative NEG^Negative mg/dL    Bilirubin Urine Negative NEG^Negative    Ketones Urine Negative NEG^Negative mg/dL    Specific Gravity Urine 1.021 1.003 - 1.035    Blood Urine Moderate (A) NEG^Negative    pH Urine 5.0 5.0 - 7.0 pH    Protein Albumin Urine Negative NEG^Negative mg/dL    Urobilinogen mg/dL 0.0 0.0 - 2.0 mg/dL    Nitrite Urine Negative NEG^Negative    Leukocyte Esterase Urine Negative NEG^Negative    Source Midstream Urine     WBC Urine 1 0 - 2 /HPF    RBC Urine 2 0 - 2 /HPF    Squamous Epithelial /HPF Urine 2 (H) 0 - 1 /HPF           Walter Chong MD

## 2018-01-09 NOTE — NURSING NOTE
Nena Underwood      1.  Has the patient received the information for the influenza vaccine? YES    2.  Does the patient have any of the following contraindications?     Allergy to eggs? No     Allergic reaction to previous influenza vaccines? No     Any other problems to previous influenza vaccines? No     Paralyzed by Guillain-[place] syndrome? No     Currently pregnant? NO     Current moderate or severe illness? No     Allergy to contact lens solution? No    3.  The vaccine has been administered in the usual fashion and the patient was instructed to wait 20 minutes before leaving the building in the event of an allergic reaction: YES    Vaccination given by TAVO HIGUERA.  Recorded by Tavo Higuera

## 2018-01-25 ENCOUNTER — TELEPHONE (OUTPATIENT)
Dept: TRANSPLANT | Facility: CLINIC | Age: 43
End: 2018-01-25

## 2018-01-25 NOTE — TELEPHONE ENCOUNTER
Patient Call: Medication Refill  Instruct the patient to first contact their pharmacy. If they have called their pharmacy and require further assistance, route to LPN.  Pharmacy Name: [facility]  Pharmacy Location:   Name of Medication: Enavarsus .75 mg-  Need to call [facility] or do [facility] on medication  When will the patient be out of this medication?

## 2018-01-26 ENCOUNTER — TELEPHONE (OUTPATIENT)
Dept: TRANSPLANT | Facility: CLINIC | Age: 43
End: 2018-01-26

## 2018-01-26 NOTE — TELEPHONE ENCOUNTER
Prior Authorization Specialty Medication Request    Medication/Dose: Envarsus 1 mg daily  Diagnosis and ICD: kidney transplant z94.0  New/Renewal/Insurance Change PA: SEAN [phone] Patient ID [id number]    Important Lab Values:     Previously Tried and Failed Therapies:     Rationale:     Would you like to include any research articles?    If yes please include the hyperlink(s) below or fax @ [phone].    (Include Name and MRN)    If you received a fax notification from an outside Pharmacy;  Pharmacy Name:[facility]  Pharmacy #:  Pharmacy Fax:

## 2018-01-29 NOTE — TELEPHONE ENCOUNTER
PA Initiation    Medication: envarsus  Insurance Company: [employer] - Phone [phone] Fax [phone]  Pharmacy Filling the Rx:    Filling Pharmacy Phone:    Filling Pharmacy Fax:    Start Date: 1/29/2018    [facility] Prior Authorization Team   Phone: [phone]  Fax: [phone]    Waiting for question set from plan

## 2018-02-05 NOTE — TELEPHONE ENCOUNTER
Prior Authorization Approval    Authorization Effective Date: 1/29/2018  Authorization Expiration Date: 12/31/2018  Medication: envarsus  Approved Dose/Quantity: 30  Reference #: (Key: [id number])   Insurance Company: [employer] - Phone [phone] Fax [phone]  Expected CoPay:       CoPay Card Available:      Foundation Assistance Needed:    Which Pharmacy is filling the prescription (Not needed for infusion/clinic administered):    Pharmacy Notified:    Patient Notified:      [facility] Prior Authorization Team   Phone: [phone]  Fax: [phone]

## 2018-02-13 DIAGNOSIS — Z94.0 KIDNEY REPLACED BY TRANSPLANT: ICD-10-CM

## 2018-02-14 RX ORDER — PREDNISONE 5 MG/1
TABLET ORAL
Qty: 90 TABLET | Refills: 3 | Status: SHIPPED | OUTPATIENT
Start: 2018-02-14 | End: 2018-08-31

## 2018-02-17 DIAGNOSIS — Z94.0 KIDNEY TRANSPLANTED: ICD-10-CM

## 2018-02-17 DIAGNOSIS — Z48.298 AFTERCARE FOLLOWING ORGAN TRANSPLANT: ICD-10-CM

## 2018-02-17 DIAGNOSIS — Z94.0 KIDNEY REPLACED BY TRANSPLANT: ICD-10-CM

## 2018-02-17 LAB
ANION GAP SERPL CALCULATED.3IONS-SCNC: 6 MMOL/L (ref 3–14)
BUN SERPL-MCNC: 18 MG/DL (ref 7–30)
CALCIUM SERPL-MCNC: 8.9 MG/DL (ref 8.5–10.1)
CHLORIDE SERPL-SCNC: 107 MMOL/L (ref 94–109)
CO2 SERPL-SCNC: 26 MMOL/L (ref 20–32)
CREAT SERPL-MCNC: 1 MG/DL (ref 0.52–1.04)
ERYTHROCYTE [DISTWIDTH] IN BLOOD BY AUTOMATED COUNT: 12.1 % (ref 10–15)
GFR SERPL CREATININE-BSD FRML MDRD: 60 ML/MIN/1.7M2
GLUCOSE SERPL-MCNC: 83 MG/DL (ref 70–99)
HCT VFR BLD AUTO: 39.9 % (ref 35–47)
HGB BLD-MCNC: 13.6 G/DL (ref 11.7–15.7)
MCH RBC QN AUTO: 33.2 PG (ref 26.5–33)
MCHC RBC AUTO-ENTMCNC: 34.1 G/DL (ref 31.5–36.5)
MCV RBC AUTO: 97 FL (ref 78–100)
PLATELET # BLD AUTO: 220 10E9/L (ref 150–450)
POTASSIUM SERPL-SCNC: 3.9 MMOL/L (ref 3.4–5.3)
RBC # BLD AUTO: 4.1 10E12/L (ref 3.8–5.2)
SODIUM SERPL-SCNC: 139 MMOL/L (ref 133–144)
TACROLIMUS BLD-MCNC: 3 UG/L (ref 5–15)
TME LAST DOSE: ABNORMAL H
WBC # BLD AUTO: 5 10E9/L (ref 4–11)

## 2018-02-19 LAB
BKV DNA # SPEC NAA+PROBE: NORMAL COPIES/ML
BKV DNA SPEC NAA+PROBE-LOG#: NORMAL LOG COPIES/ML
EBV DNA # SPEC NAA+PROBE: 4077 {COPIES}/ML
EBV DNA SPEC NAA+PROBE-LOG#: 3.6 {LOG_COPIES}/ML
SPECIMEN SOURCE: NORMAL

## 2018-03-16 ENCOUNTER — TELEPHONE (OUTPATIENT)
Dept: NEPHROLOGY | Facility: CLINIC | Age: 43
End: 2018-03-16

## 2018-03-16 VITALS
HEIGHT: 59 IN | DIASTOLIC BLOOD PRESSURE: 71 MMHG | HEART RATE: 85 BPM | WEIGHT: 106 LBS | TEMPERATURE: 98.4 F | SYSTOLIC BLOOD PRESSURE: 124 MMHG | OXYGEN SATURATION: 100 % | RESPIRATION RATE: 22 BRPM | BODY MASS INDEX: 21.37 KG/M2

## 2018-03-16 PROCEDURE — 96361 HYDRATE IV INFUSION ADD-ON: CPT

## 2018-03-16 PROCEDURE — 96374 THER/PROPH/DIAG INJ IV PUSH: CPT

## 2018-03-16 PROCEDURE — 96375 TX/PRO/DX INJ NEW DRUG ADDON: CPT

## 2018-03-16 PROCEDURE — 99284 EMERGENCY DEPT VISIT MOD MDM: CPT | Mod: 25

## 2018-03-16 RX ORDER — ONDANSETRON 4 MG/1
4 TABLET, ORALLY DISINTEGRATING ORAL ONCE
Status: DISCONTINUED | OUTPATIENT
Start: 2018-03-16 | End: 2018-03-17 | Stop reason: HOSPADM

## 2018-03-16 NOTE — TELEPHONE ENCOUNTER
Pt called this morning wondering if she needs to continue have labs drawn monthly or if she can go back to a 3 month schedule. Please call her and advise.

## 2018-03-17 ENCOUNTER — HOSPITAL ENCOUNTER (EMERGENCY)
Facility: CLINIC | Age: 43
Discharge: HOME | End: 2018-03-17
Attending: EMERGENCY MEDICINE | Admitting: EMERGENCY MEDICINE
Payer: OTHER GOVERNMENT

## 2018-03-17 DIAGNOSIS — R19.7 VOMITING AND DIARRHEA: ICD-10-CM

## 2018-03-17 DIAGNOSIS — R10.84 GENERALIZED ABDOMINAL PAIN: ICD-10-CM

## 2018-03-17 DIAGNOSIS — R11.10 VOMITING AND DIARRHEA: ICD-10-CM

## 2018-03-17 LAB
ALBUMIN SERPL-MCNC: 4.2 G/DL (ref 3.4–5)
ALP SERPL-CCNC: 69 U/L (ref 40–150)
ALT SERPL W P-5'-P-CCNC: 20 U/L (ref 0–50)
ANION GAP SERPL CALCULATED.3IONS-SCNC: 9 MMOL/L (ref 3–14)
AST SERPL W P-5'-P-CCNC: 28 U/L (ref 0–45)
BASOPHILS # BLD AUTO: 0 10E9/L (ref 0–0.2)
BASOPHILS NFR BLD AUTO: 0.1 %
BILIRUB SERPL-MCNC: 1.1 MG/DL (ref 0.2–1.3)
BUN SERPL-MCNC: 29 MG/DL (ref 7–30)
CALCIUM SERPL-MCNC: 9 MG/DL (ref 8.5–10.1)
CHLORIDE SERPL-SCNC: 108 MMOL/L (ref 94–109)
CO2 SERPL-SCNC: 21 MMOL/L (ref 20–32)
CREAT SERPL-MCNC: 0.88 MG/DL (ref 0.52–1.04)
DIFFERENTIAL METHOD BLD: ABNORMAL
EOSINOPHIL # BLD AUTO: 0.1 10E9/L (ref 0–0.7)
EOSINOPHIL NFR BLD AUTO: 1.2 %
ERYTHROCYTE [DISTWIDTH] IN BLOOD BY AUTOMATED COUNT: 12.2 % (ref 10–15)
GFR SERPL CREATININE-BSD FRML MDRD: 70 ML/MIN/1.7M2
GLUCOSE SERPL-MCNC: 102 MG/DL (ref 70–99)
HCT VFR BLD AUTO: 40.2 % (ref 35–47)
HGB BLD-MCNC: 14.5 G/DL (ref 11.7–15.7)
IMM GRANULOCYTES # BLD: 0 10E9/L (ref 0–0.4)
IMM GRANULOCYTES NFR BLD: 0.1 %
LIPASE SERPL-CCNC: 241 U/L (ref 73–393)
LYMPHOCYTES # BLD AUTO: 0.3 10E9/L (ref 0.8–5.3)
LYMPHOCYTES NFR BLD AUTO: 3.5 %
MCH RBC QN AUTO: 33.6 PG (ref 26.5–33)
MCHC RBC AUTO-ENTMCNC: 36.1 G/DL (ref 31.5–36.5)
MCV RBC AUTO: 93 FL (ref 78–100)
MONOCYTES # BLD AUTO: 1 10E9/L (ref 0–1.3)
MONOCYTES NFR BLD AUTO: 11.9 %
NEUTROPHILS # BLD AUTO: 6.8 10E9/L (ref 1.6–8.3)
NEUTROPHILS NFR BLD AUTO: 83.2 %
PLATELET # BLD AUTO: 243 10E9/L (ref 150–450)
POTASSIUM SERPL-SCNC: 3.4 MMOL/L (ref 3.4–5.3)
PROT SERPL-MCNC: 8 G/DL (ref 6.8–8.8)
RBC # BLD AUTO: 4.31 10E12/L (ref 3.8–5.2)
SODIUM SERPL-SCNC: 138 MMOL/L (ref 133–144)
WBC # BLD AUTO: 8.2 10E9/L (ref 4–11)

## 2018-03-17 PROCEDURE — 83690 ASSAY OF LIPASE: CPT | Performed by: EMERGENCY MEDICINE

## 2018-03-17 PROCEDURE — 85025 COMPLETE CBC W/AUTO DIFF WBC: CPT | Performed by: EMERGENCY MEDICINE

## 2018-03-17 PROCEDURE — 25000128 H RX IP 250 OP 636: Performed by: EMERGENCY MEDICINE

## 2018-03-17 PROCEDURE — 25000125 ZZHC RX 250: Performed by: EMERGENCY MEDICINE

## 2018-03-17 PROCEDURE — 80053 COMPREHEN METABOLIC PANEL: CPT | Performed by: EMERGENCY MEDICINE

## 2018-03-17 RX ORDER — ONDANSETRON 4 MG/1
4 TABLET, ORALLY DISINTEGRATING ORAL EVERY 8 HOURS PRN
Qty: 10 TABLET | Refills: 0 | Status: SHIPPED | OUTPATIENT
Start: 2018-03-17 | End: 2018-03-20

## 2018-03-17 RX ORDER — ONDANSETRON 2 MG/ML
4 INJECTION INTRAMUSCULAR; INTRAVENOUS ONCE
Status: COMPLETED | OUTPATIENT
Start: 2018-03-17 | End: 2018-03-17

## 2018-03-17 RX ADMIN — FAMOTIDINE 20 MG: 10 INJECTION, SOLUTION INTRAVENOUS at 00:45

## 2018-03-17 RX ADMIN — SODIUM CHLORIDE 1000 ML: 9 INJECTION, SOLUTION INTRAVENOUS at 00:44

## 2018-03-17 RX ADMIN — ONDANSETRON 4 MG: 2 INJECTION INTRAMUSCULAR; INTRAVENOUS at 00:45

## 2018-03-17 ASSESSMENT — ENCOUNTER SYMPTOMS
VOMITING: 1
DIARRHEA: 1
DIAPHORESIS: 1
ANAL BLEEDING: 1
CHILLS: 1

## 2018-03-17 NOTE — ED PROVIDER NOTES
"  History     Chief Complaint:  Abdominal Pain     HPI   Nena Underwood is a 43 year old female with a history of kidney transplantation who presents to the emergency department today for evaluation of abdominal pain. The patient reports that she believes she has bad food poisoning, noting that around 1700 tonight, 3/16/18, she began experiencing episodes of vomiting x5 and diarrhea x3 accompanied by diaphoresis and chills. She adds that her stomach is turning and painful. The patient notes that her nausea has subsided.     Allergies:  No known drug allergies     Medications:    Deltasone  Envarsus  Imuran  Bactrim  Implanon  Tylenol    Past Medical History:    Anemia   High risk medication use   Hyperparathyroidism, secondary renal   Immunosuppressed status   Kidney replaced by transplant   Moraxella catarrhalis pneumonia  Pneumonia   Renal aplasia   Single seizure    TB (pulmonary tuberculosis)     Past Surgical History:     section  Hemodialysis via av fistula  Insert intrauterine device  Remove intrauterine device  Transplant kidney recipient living unrelated    Family History:    Paternal grandmother: TB  Mother: retinal problems    Social History:  Smoking Status: Never Smoker  Smokeless Tobacco: Never Used  Alcohol Use: Negative  Marital Status:        Review of Systems   Constitutional: Positive for chills and diaphoresis.   Gastrointestinal: Positive for anal bleeding, diarrhea and vomiting.        Stomach is turning and is painful   All other systems reviewed and are negative.    Physical Exam     Patient Vitals for the past 24 hrs:   BP Temp Temp src Pulse Heart Rate Resp SpO2 Height Weight   18 2038 124/71 98.4  F (36.9  C) Temporal 85 85 22 100 % 1.499 m (4' 11\") 48.1 kg (106 lb)     Physical Exam  Constitutional:  Appears well-developed and well-nourished. Cooperative.   HENT:   Head:    Atraumatic.   Mouth/Throat:   Oropharynx is without erythema or exudate. Dry oral " mucosa.  Eyes:    Conjunctivae normal and EOM are normal.      Pupils are equal, round, and reactive to light.   Neck:    Normal range of motion. Neck supple.   Cardiovascular:  Normal rate, regular rhythm, normal heart sounds and radial and dorsalis pedis pulses are 2+ and symmetric.    Pulmonary/Chest:  Effort normal and breath sounds normal.   Abdominal:   Soft. Hyperactive bowel sounds. Mildly diffusely tender mid abdomen.     No splenomegaly or hepatomegaly. No tenderness. No rebound. No tenderness over kidney graft.  Musculoskeletal:  Normal range of motion. No edema and no tenderness.   Neurological:  Alert. Normal strength. No cranial nerve deficit.   Skin:    Skin is warm and dry.   Psychiatric:   Normal mood and affect.     Emergency Department Course     Laboratory:  Laboratory findings were communicated with the patient who voiced understanding of the findings.    CBC: WBC 8.2, HGB 14.5,   CMP: Glucose 102 (H) o/w WNL (Creatinine 0.88)  Lipase: 241    Interventions:  0044 NS 1000 ml IV  0045 Pepcid 20 mg IV  0045 Zofran 4 mg IV    Emergency Department Course:    0021 Nursing notes and vitals reviewed.    0025 I performed an exam of the patient as documented above.     0045 IV was inserted and blood was drawn for laboratory testing, results above.    0243 I personally reviewed the laboratory results with the patient and answered all related questions prior to discharge.    Impression & Plan      Medical Decision Making:  Nena Underwood is a 43 year old female who presents for evaluation of nausea, vomiting and diarrhea with minimal abdominal pain in a nonfocal abdominal exam. The differential diagnosis of vomiting and diarrhea is broad and includes such etiologies as viral gastroenteritis, bacterial infection of the large intestine (salmonella, shigella, campylobacter, e coli, etc), bowel obstruction, intra-abdominal infection such as colitis, cholecystitis, UTI, pyelonephritis, appendicitis,  etc.  There are no signs of worrisome intra-abdominal pathologies detected during the visit today.  The patient has a completely benign abdominal exam without rebound, guarding, or marked tenderness to palpation.  Laboratory studies were unremarkable.  The patient improved with iv fluids and anti-emetics. Supportive outpatient management is therefore indicated.  Abdominal pain precautions are given for home.   No indication for stool studies at this time.  No indication for CT at this time.  It was discussed with the patient to return to the ED for blood in stool, increasing pain, or fevers more than 102.   Feels much improved after interventions in ED.     The patient has a history of kidney transplant. Her creatinine is normal, as is the remainder of her metabolic panel and CBC with differential. She is given IV hydration. Repeat abdominal exam again reveals no significant tenderness in abdomen or tenderness over her kidney graft.    Diagnosis:    ICD-10-CM    1. Vomiting and diarrhea R11.10     R19.7    2. Generalized abdominal pain R10.84      Disposition:   The patient is discharged to home.    Discharge Medications:  Discharge Medication List as of 3/17/2018  2:36 AM      START taking these medications    Details   ondansetron (ZOFRAN ODT) 4 MG ODT tab Take 1 tablet (4 mg) by mouth every 8 hours as needed for nausea, Disp-10 tablet, R-0, Local Print           Scribe Disclosure:  Lizabeth SOLORIO, am serving as a scribe at 1:22 AM on 3/17/2018 to document services personally performed by Oz Richards MD based on my observations and the provider's statements to me.     EMERGENCY DEPARTMENT       Oz Richards MD  03/19/18 0419

## 2018-03-17 NOTE — ED AVS SNAPSHOT
Emergency Department    [address] [facility] [address]    Phone:  [phone]    Fax:  [phone]                                       Nena Underwood   MRN: [id number]    Department:   Emergency Department   Date of Visit:  3/16/2018           After Visit Summary Signature Page     I have received my discharge instructions, and my questions have been answered. I have discussed any challenges I see with this plan with the nurse or doctor.    ..........................................................................................................................................  Patient/Patient Representative Signature      ..........................................................................................................................................  Patient Representative Print Name and Relationship to Patient    ..................................................               ................................................  Date                                            Time    ..........................................................................................................................................  Reviewed by Signature/Title    ...................................................              ..............................................  Date                                                            Time

## 2018-03-17 NOTE — DISCHARGE INSTRUCTIONS
*Abdominal Pain, Unknown Cause (Female)    The exact cause of your abdominal (stomach) pain is not certain. This does not mean that this is something to worry about, or the right tests were not done. Everyone likes to know the exact cause of the problem, but sometimes with abdominal pain, there is no clear-cut cause, and this could be a good thing. The good news is that your symptoms can be treated, and you will feel better.   Your condition does not seem serious now; however, sometimes the signs of a serious problem may take more time to appear. For this reason, it is important for you to watch for any new symptoms, problems, or worsening of your condition.  Over the next few days, the abdominal pain may come and go, or be continuous. Other common symptoms can include nausea and vomiting. Sometimes it can be difficult to tell if you feel nauseous, you may just feel bad and not associate that feeling with nausea. Constipation, diarrhea, and a fever may go along with the pain.  The pain may continue even if treated correctly over the following days. Depending on how things go, sometimes the cause can become clear and may require further or different treatment. Additional evaluations, medications, or tests may be needed.  Home care  Your health care provider may prescribe medications for pain, symptoms, or an infection.  Follow the health care provider's instructions for taking these medications.  General care    Rest until your next exam. No strenuous activities.    Try to find positions that ease discomfort. A small pillow placed on the abdomen may help relieve pain.    Something warm on your abdomen (such as a heating pad) may help, but be careful not to burn yourself.  Diet    Do not force yourself to eat, especially if having cramps, vomiting, or diarrhea.    Water is important so you do not get dehydrated. Soup may also be good. Sports drinks may also help, especially if they are not too acidic. Make sure you  "don't drink sugary drinks as this can make things worse. Take liquids in small amounts. Do not guzzle them.    Caffeine sometimes makes the pain and cramping worse.    Avoid dairy products if you have vomiting or diarrhea.    Don't eat large amounts at a time. Wait a few minutes between bites.    Eat a diet low in fiber (called a low-residue diet). Foods allowed include refined breads, white rice, fruit and vegetable juices without pulp, tender meats. These foods will pass more easily through the intestine.    Avoid fried or fatty foods, dairy, alcohol and spicy foods until your symptoms go away.  Follow-up care  Follow up with your health care provider as instructed, or if your pain does not begin to improve in the next 24 hours.  When to seek medical care  Seek prompt medical care if any of the following occur:    Pain gets worse or moves to the right lower abdomen    New or worsening vomiting or diarrhea    Swelling of the abdomen    Unable to pass stool for more than three days    New fever over 101  F (38.3 C), or rising fever    Blood in vomit or bowel movements (dark red or black color)    Jaundice (yellow color of eyes and skin)    Weakness, dizziness    Chest, arm, back, neck or jaw pain    Unexpected vaginal bleeding or missed period  Call 911  Call emergency services if any of the following occur:    Trouble breathing    Confusion    Fainting or loss of consciousness    Rapid heart rate    Seizure    [id number] [facility], [address] [place] [address], [place] [address]. All rights reserved. This information is not intended as a substitute for professional medical care. Always follow your healthcare professional's instructions.           * VOMITING [6yr-Adult]  Vomiting is a common symptom that may be due to different causes. These include gastroenteritis (\"stomach-flu\"), food poisoning and gastritis. There are other more serious causes of vomiting which may be hard to diagnose early in the illness. " "Therefore, it is important to watch for the warning signs listed below.  The main danger from repeated vomiting is \"dehydration\". This is due to excess loss of water and minerals from the body. When this occurs, body fluids must be replaced.`  HOME CARE:    If symptoms are severe, rest at home for the next 24 hours.    You may use acetaminophen (Tylenol) 650-1000 mg every 6 hours to control fever, unless another medicine was prescribed. [ NOTE : If you have chronic liver disease, talk with your doctor before using acetaminophen.] (Aspirin should never be used in anyone under 18 years of age who is ill with a fever. It may cause severe liver damage.)    Avoid tobacco and alcohol use, which may worsen your symptoms.    If medicines for vomiting were prescribed, take as directed.  DURING THE FIRST 12-24 HOURS follow the diet below. Try to take frequent small sips even if you vomit occasionally:    FRUIT JUICES: Apple, grape juice, clear fruit drinks, electrolyte replacement and sports drinks.    BEVERAGES: Sport drinks such as Gatorade, soft drinks without caffeine; mineral water (plain or flavored), decaffeinated tea and coffee.    SOUPS: Clear broth, consommé and bouillon    DESSERTS: Plain gelatin (Jell-O), popsicles and fruit juice bars.  DURING THE NEXT 24 HOURS you may add the following to the above:    Hot cereal, plain toast, bread, rolls, crackers    Plain noodles, rice, mashed potatoes, chicken noodle or rice soup    Unsweetened canned fruit (avoid pineapple), bananas    Avoid dairy products    Limit caffeine and chocolate. No spices or seasonings except salt.  DURING THE NEXT 24 HOURS  Slowly go back to a normal diet, as you feel better and your symptoms lessen.  FOLLOW UP with your doctor as advised if you are not improving over the next 2-3 days.  GET PROMPT MEDICAL ATTENTION if any of the following occur:    Constant abdominal pain that stays in the same spot or gets worse    Continued vomiting (unable to " keep liquids down) for 24 hours    Frequent diarrhea (more than 5 times a day); blood (red or black color) in diarrhea    No urine output for 12 hours or extreme thirst    Weakness, dizziness or fainting    Unusually drowsy or confused    Fever over 101.0  F (38.3  C) for more than 3 days    Yellow color of the eyes or skin    2917-3887 [employer]. [address] [place] [address]. All rights reserved. This information is not intended as a substitute for professional medical care. Always follow your healthcare professional's instructions.  This information has been modified by your health care provider with permission from the publisher.      Nonspecific Vomiting and Diarrhea (Adult)  Vomiting and diarrhea can have many causes, including:    Helping your body get rid of harmful substances     Gastroenteritis caused by viruses, parasites, bacteria, or toxins.    Allergy to or side effect of a food or medicine    Severe stress or worry (anxiety)     Other illnesses    Pregnancy  It is often hard to pinpoint an exact cause, even with testing. Vomiting and diarrhea often go away within a day or two without problems. If they continue, though, they can lead to too much loss of fluid (dehydration). This can be serious if not treated.    Home care  Medications    You may use acetaminophen or NSAID medicines like ibuprofen or naproxen to control fever, unless another medicine was prescribed. If you have chronic liver or kidney disease, talk with your healthcare provider before using these medicines. Also talk with your provider if you've had a stomach ulcer or gastrointestinal bleeding. Don't give aspirin to anyone under 18 years of age who is ill with a fever. Don't use NSAID medicines if you are already taking one for another condition (like arthritis) or are on aspirin (such as for heart disease or after a stroke)    If medicines for diarrhea or vomiting were prescribed, take these only as  directed. Never take these without a healthcare provider s approval.  General care    If symptoms are severe, rest at home for the next 24 hours, or until you are feeling better.    Washing your hands with soap and water, or using alcohol-based hand  is the best way to stop the spread of infection. Wash your hands after touching anyone who is sick.    Wash your hands after using the toilet and before meals. Clean the toilet after each use.    Caffeine, tobacco, and alcohol can make the diarrhea, cramping, and pain worse. Remember, caffeine not only is in coffee, but also is in chocolate, some energy drinks, and teas.  Diet    Water and clear liquids are important so you don't get dehydrated. Drink a small amount at a time. Don't guzzle down the drinks. That may increase your nausea, make cramping worse, and cause the drinks to come back up.    Sports drinks may also help. Make sure they are not too sugary, because this can sometimes make things worse. Also, don't drink beverages that are too acidic, like orange juice and grape juice.    If you are very dehydrated, sports drinks aren't a good choice. They have too much sugar and not enough electrolytes. In this case, commercially available products called oral rehydration solutions are best.  Food    Don't force yourself to eat, especially if you have cramps, diarrhea, or vomiting. Eat just a little at a time, and then wait a few minutes before you try to eat more.    Don't eat fatty, greasy, spicy, or fried foods.    Don't eat dairy products if you have diarrhea. They can make it worse.  During the first 24 hours (the first full day), follow the diet below:    Beverages: Sports drinks, soft drinks without caffeine, mineral water, and decaffeinated tea and coffee    Soups: Clear broth, consommé, and bouillon    Desserts: Plain gelatin, popsicles, and fruit juice bars  During the next 24 hours (the second day), you may add the following to the above if you  are better. If not, continue what you did the first day:    Hot cereal, plain toast, bread, rolls, crackers    Plain noodles, rice, mashed potatoes, chicken noodle or rice soup    Unsweetened canned fruit (avoid pineapple), bananas    Limit fat intake to less than 15 grams per day by avoiding margarine, butter, oils, mayonnaise, sauces, gravies, fried foods, peanut butter, meat, poultry, and fish.    Limit fiber. Avoid raw or cooked vegetables, fresh fruits (except bananas) and bran cereals.    Limit caffeine and chocolate. No spices or seasonings except salt.  During the next 24 hours:    Gradually resume a normal diet, as you feel better and your symptoms improve.    If at any time your symptoms start getting worse again, go back to clear liquids until you feel better.  Food preparation    If you have diarrhea, you should not prepare food for others. When preparing foods, wash your hands before and after.    Wash your hands or use alcohol-based  after using cutting boards, countertops, and knives that have been in contact with raw food.    Keep uncooked meats away from cooked and ready-to-eat foods.  Follow-up care  Follow up with your healthcare advisor, or as advised. Call if you do not get better in the next 2 to 3 days. If a stool (diarrhea) sample was taken, or cultures done, you will be told if they are positive, or if your treatment needs to be changed. You may call as directed for the results.  If X-rays were taken, and a radiologist has not yet looked at them, he or she will do so. You will be told if there is a change in the reading, especially if it affects your treatment.  Call 911  Call 911 if any of these occur:    Trouble breathing    Chest pain    Confusion    Severe drowsiness or trouble awakening    Fainting or loss of consciousness    Rapid heart rate    Seizure    Stiff neck    Severe weakness, dizziness, or lightheadedness  When to seek medical advice  Call your healthcare provider  right away if any of these occur:    Bloody or black vomit or stools.    Severe, steady abdominal pain or any abdominal pain that is getting worse.    Severe headache or stiff neck    An inability to hold down even sips of liquids for more than 12 hours.    Vomiting that lasts more than 24 hours.    Diarrhea that lasts more than 24 hours.    Fever of 100.4 F (38.0 C) or higher that lasts more than 48 hours, or as directed by your health care provider    Yellowish color to your skin or the whites of your eyes.    Signs of dehydration, such as dry mouth, little urine (less than every 6 hours), or very dark urine.  Date Last Reviewed: 1/3/2016    9980-5791 [employer]. [address] [place] [address], [place] [address]. All rights reserved. This information is not intended as a substitute for professional medical care. Always follow your healthcare professional's instructions.

## 2018-03-17 NOTE — ED AVS SNAPSHOT
Emergency Department    [address] [facility] [address]    Phone:  [phone]    Fax:  [phone]                                       Nena Underwood   MRN: [id number]    Department:   Emergency Department   Date of Visit:  3/16/2018           Patient Information     Date Of Birth          1975        Your diagnoses for this visit were:     Vomiting and diarrhea     Generalized abdominal pain        You were seen by Oz Richards MD.      Follow-up Information     Follow up with Jorge L Tucker MD In 2 days.    Specialty:  Family Practice    Contact information:    [address] [place] [address] [phone]          Follow up with  Emergency Department.    Specialty:  EMERGENCY MEDICINE    Why:  If symptoms worsen    Contact information:    [address] [facility] [address] [phone]        Discharge Instructions         *Abdominal Pain, Unknown Cause (Female)    The exact cause of your abdominal (stomach) pain is not certain. This does not mean that this is something to worry about, or the right tests were not done. Everyone likes to know the exact cause of the problem, but sometimes with abdominal pain, there is no clear-cut cause, and this could be a good thing. The good news is that your symptoms can be treated, and you will feel better.   Your condition does not seem serious now; however, sometimes the signs of a serious problem may take more time to appear. For this reason, it is important for you to watch for any new symptoms, problems, or worsening of your condition.  Over the next few days, the abdominal pain may come and go, or be continuous. Other common symptoms can include nausea and vomiting. Sometimes it can be difficult to tell if you feel nauseous, you may just feel bad and not associate that feeling with nausea. Constipation, diarrhea, and a fever may go along with the pain.  The pain may continue even if treated  correctly over the following days. Depending on how things go, sometimes the cause can become clear and may require further or different treatment. Additional evaluations, medications, or tests may be needed.  Home care  Your health care provider may prescribe medications for pain, symptoms, or an infection.  Follow the health care provider's instructions for taking these medications.  General care    Rest until your next exam. No strenuous activities.    Try to find positions that ease discomfort. A small pillow placed on the abdomen may help relieve pain.    Something warm on your abdomen (such as a heating pad) may help, but be careful not to burn yourself.  Diet    Do not force yourself to eat, especially if having cramps, vomiting, or diarrhea.    Water is important so you do not get dehydrated. Soup may also be good. Sports drinks may also help, especially if they are not too acidic. Make sure you don't drink sugary drinks as this can make things worse. Take liquids in small amounts. Do not guzzle them.    Caffeine sometimes makes the pain and cramping worse.    Avoid dairy products if you have vomiting or diarrhea.    Don't eat large amounts at a time. Wait a few minutes between bites.    Eat a diet low in fiber (called a low-residue diet). Foods allowed include refined breads, white rice, fruit and vegetable juices without pulp, tender meats. These foods will pass more easily through the intestine.    Avoid fried or fatty foods, dairy, alcohol and spicy foods until your symptoms go away.  Follow-up care  Follow up with your health care provider as instructed, or if your pain does not begin to improve in the next 24 hours.  When to seek medical care  Seek prompt medical care if any of the following occur:    Pain gets worse or moves to the right lower abdomen    New or worsening vomiting or diarrhea    Swelling of the abdomen    Unable to pass stool for more than three days    New fever over 101  F (38.3 C),  "or rising fever    Blood in vomit or bowel movements (dark red or black color)    Jaundice (yellow color of eyes and skin)    Weakness, dizziness    Chest, arm, back, neck or jaw pain    Unexpected vaginal bleeding or missed period  Call 911  Call emergency services if any of the following occur:    Trouble breathing    Confusion    Fainting or loss of consciousness    Rapid heart rate    Seizure    [id number] Kimmy [employer], [address] [facility], [place] [address]. All rights reserved. This information is not intended as a substitute for professional medical care. Always follow your healthcare professional's instructions.           * VOMITING [6yr-Adult]  Vomiting is a common symptom that may be due to different causes. These include gastroenteritis (\"stomach-flu\"), food poisoning and gastritis. There are other more serious causes of vomiting which may be hard to diagnose early in the illness. Therefore, it is important to watch for the warning signs listed below.  The main danger from repeated vomiting is \"dehydration\". This is due to excess loss of water and minerals from the body. When this occurs, body fluids must be replaced.`  HOME CARE:    If symptoms are severe, rest at home for the next 24 hours.    You may use acetaminophen (Tylenol) 650-1000 mg every 6 hours to control fever, unless another medicine was prescribed. [ NOTE : If you have chronic liver disease, talk with your doctor before using acetaminophen.] (Aspirin should never be used in anyone under 18 years of age who is ill with a fever. It may cause severe liver damage.)    Avoid tobacco and alcohol use, which may worsen your symptoms.    If medicines for vomiting were prescribed, take as directed.  DURING THE FIRST 12-24 HOURS follow the diet below. Try to take frequent small sips even if you vomit occasionally:    FRUIT JUICES: Apple, grape juice, clear fruit drinks, electrolyte replacement and sports drinks.    BEVERAGES: Sport drinks such as " Gatorade, soft drinks without caffeine; mineral water (plain or flavored), decaffeinated tea and coffee.    SOUPS: Clear broth, consommé and bouillon    DESSERTS: Plain gelatin (Jell-O), popsicles and fruit juice bars.  DURING THE NEXT 24 HOURS you may add the following to the above:    Hot cereal, plain toast, bread, rolls, crackers    Plain noodles, rice, mashed potatoes, chicken noodle or rice soup    Unsweetened canned fruit (avoid pineapple), bananas    Avoid dairy products    Limit caffeine and chocolate. No spices or seasonings except salt.  DURING THE NEXT 24 HOURS  Slowly go back to a normal diet, as you feel better and your symptoms lessen.  FOLLOW UP with your doctor as advised if you are not improving over the next 2-3 days.  GET PROMPT MEDICAL ATTENTION if any of the following occur:    Constant abdominal pain that stays in the same spot or gets worse    Continued vomiting (unable to keep liquids down) for 24 hours    Frequent diarrhea (more than 5 times a day); blood (red or black color) in diarrhea    No urine output for 12 hours or extreme thirst    Weakness, dizziness or fainting    Unusually drowsy or confused    Fever over 101.0  F (38.3  C) for more than 3 days    Yellow color of the eyes or skin    1388-2500 [employer]. [address] [place], [place] [id number]. All rights reserved. This information is not intended as a substitute for professional medical care. Always follow your healthcare professional's instructions.  This information has been modified by your health care provider with permission from the publisher.      Nonspecific Vomiting and Diarrhea (Adult)  Vomiting and diarrhea can have many causes, including:    Helping your body get rid of harmful substances     Gastroenteritis caused by viruses, parasites, bacteria, or toxins.    Allergy to or side effect of a food or medicine    Severe stress or worry (anxiety)     Other illnesses    Pregnancy  It is often hard to  pinpoint an exact cause, even with testing. Vomiting and diarrhea often go away within a day or two without problems. If they continue, though, they can lead to too much loss of fluid (dehydration). This can be serious if not treated.    Home care  Medications    You may use acetaminophen or NSAID medicines like ibuprofen or naproxen to control fever, unless another medicine was prescribed. If you have chronic liver or kidney disease, talk with your healthcare provider before using these medicines. Also talk with your provider if you've had a stomach ulcer or gastrointestinal bleeding. Don't give aspirin to anyone under 18 years of age who is ill with a fever. Don't use NSAID medicines if you are already taking one for another condition (like arthritis) or are on aspirin (such as for heart disease or after a stroke)    If medicines for diarrhea or vomiting were prescribed, take these only as directed. Never take these without a healthcare provider s approval.  General care    If symptoms are severe, rest at home for the next 24 hours, or until you are feeling better.    Washing your hands with soap and water, or using alcohol-based hand  is the best way to stop the spread of infection. Wash your hands after touching anyone who is sick.    Wash your hands after using the toilet and before meals. Clean the toilet after each use.    Caffeine, tobacco, and alcohol can make the diarrhea, cramping, and pain worse. Remember, caffeine not only is in coffee, but also is in chocolate, some energy drinks, and teas.  Diet    Water and clear liquids are important so you don't get dehydrated. Drink a small amount at a time. Don't guzzle down the drinks. That may increase your nausea, make cramping worse, and cause the drinks to come back up.    Sports drinks may also help. Make sure they are not too sugary, because this can sometimes make things worse. Also, don't drink beverages that are too acidic, like orange juice  and grape juice.    If you are very dehydrated, sports drinks aren't a good choice. They have too much sugar and not enough electrolytes. In this case, commercially available products called oral rehydration solutions are best.  Food    Don't force yourself to eat, especially if you have cramps, diarrhea, or vomiting. Eat just a little at a time, and then wait a few minutes before you try to eat more.    Don't eat fatty, greasy, spicy, or fried foods.    Don't eat dairy products if you have diarrhea. They can make it worse.  During the first 24 hours (the first full day), follow the diet below:    Beverages: Sports drinks, soft drinks without caffeine, mineral water, and decaffeinated tea and coffee    Soups: Clear broth, consommé, and bouillon    Desserts: Plain gelatin, popsicles, and fruit juice bars  During the next 24 hours (the second day), you may add the following to the above if you are better. If not, continue what you did the first day:    Hot cereal, plain toast, bread, rolls, crackers    Plain noodles, rice, mashed potatoes, chicken noodle or rice soup    Unsweetened canned fruit (avoid pineapple), bananas    Limit fat intake to less than 15 grams per day by avoiding margarine, butter, oils, mayonnaise, sauces, gravies, fried foods, peanut butter, meat, poultry, and fish.    Limit fiber. Avoid raw or cooked vegetables, fresh fruits (except bananas) and bran cereals.    Limit caffeine and chocolate. No spices or seasonings except salt.  During the next 24 hours:    Gradually resume a normal diet, as you feel better and your symptoms improve.    If at any time your symptoms start getting worse again, go back to clear liquids until you feel better.  Food preparation    If you have diarrhea, you should not prepare food for others. When preparing foods, wash your hands before and after.    Wash your hands or use alcohol-based  after using cutting boards, countertops, and knives that have been in  contact with raw food.    Keep uncooked meats away from cooked and ready-to-eat foods.  Follow-up care  Follow up with your healthcare advisor, or as advised. Call if you do not get better in the next 2 to 3 days. If a stool (diarrhea) sample was taken, or cultures done, you will be told if they are positive, or if your treatment needs to be changed. You may call as directed for the results.  If X-rays were taken, and a radiologist has not yet looked at them, he or she will do so. You will be told if there is a change in the reading, especially if it affects your treatment.  Call 911  Call 911 if any of these occur:    Trouble breathing    Chest pain    Confusion    Severe drowsiness or trouble awakening    Fainting or loss of consciousness    Rapid heart rate    Seizure    Stiff neck    Severe weakness, dizziness, or lightheadedness  When to seek medical advice  Call your healthcare provider right away if any of these occur:    Bloody or black vomit or stools.    Severe, steady abdominal pain or any abdominal pain that is getting worse.    Severe headache or stiff neck    An inability to hold down even sips of liquids for more than 12 hours.    Vomiting that lasts more than 24 hours.    Diarrhea that lasts more than 24 hours.    Fever of 100.4 F (38.0 C) or higher that lasts more than 48 hours, or as directed by your health care provider    Yellowish color to your skin or the whites of your eyes.    Signs of dehydration, such as dry mouth, little urine (less than every 6 hours), or very dark urine.  Date Last Reviewed: 1/3/2016    1855-6631 [employer]. [address] [place] [address]. All rights reserved. This information is not intended as a substitute for professional medical care. Always follow your healthcare professional's instructions.          Future Appointments        Provider Department Dept Phone Center    7/17/2018 4:50 PM Walter Chong MD [facility] Nephrology  [phone] [facility]      24 Hour Appointment Hotline       To make an appointment at any [facility], call [phone] ([phone]). If you don't have a family doctor or clinic, we will help you find one. [place] clinics are conveniently located to serve the needs of you and your family.             Review of your medicines      START taking        Dose / Directions Last dose taken    ondansetron 4 MG ODT tab   Commonly known as:  ZOFRAN ODT   Dose:  4 mg   Quantity:  10 tablet        Take 1 tablet (4 mg) by mouth every 8 hours as needed for nausea   Refills:  0          Our records show that you are taking the medicines listed below. If these are incorrect, please call your family doctor or clinic.        Dose / Directions Last dose taken    azaTHIOprine 50 MG tablet   Commonly known as:  IMURAN   Dose:  50 mg   Quantity:  90 tablet        Take 1 tablet (50 mg) by mouth daily   Refills:  3        etonogestrel 68 MG Impl   Commonly known as:  IMPLANON/NEXPLANON   Dose:  1 each   Quantity:  1 each        1 each (68 mg) by Subdermal route once for 1 dose   Refills:  0        * predniSONE 5 MG tablet   Commonly known as:  DELTASONE   Dose:  5 mg   Quantity:  90 tablet        Take 1 tablet (5 mg) by mouth daily   Refills:  3        * predniSONE 5 MG tablet   Commonly known as:  DELTASONE   Quantity:  90 tablet        TAKE ONE TABLET BY MOUTH EVERY DAY   Refills:  3        sulfamethoxazole-trimethoprim 400-80 MG per tablet   Commonly known as:  BACTRIM/SEPTRA   Dose:  1 tablet   Indication:  PJP ppx   Quantity:  90 tablet        Take 1 tablet by mouth daily   Refills:  3        tacrolimus 1 MG 24 hr tablet   Commonly known as:  ENVARSUS XR   Dose:  1 mg   Quantity:  30 tablet        Take 1 tablet (1 mg) by mouth every morning (before breakfast)   Refills:  11        TYLENOL PO   Dose:  500 mg        Take 500 mg by mouth every 4 hours as needed for mild pain or fever   Refills:  0        * Notice:  This list  has 2 medication(s) that are the same as other medications prescribed for you. Read the directions carefully, and ask your doctor or other care provider to review them with you.            Prescriptions were sent or printed at these locations (1 Prescription)                   Other Prescriptions                Printed at Department/Unit printer (1 of 1)         ondansetron (ZOFRAN ODT) 4 MG ODT tab                Procedures and tests performed during your visit     CBC with platelets + differential    Comprehensive metabolic panel    Lipase      Orders Needing Specimen Collection     Ordered          03/17/18 0031  Enteric Bacteria and Virus Panel by LAZARO Stool - STAT, Prio: STAT, Needs to be Collected     Scheduled Task Status   03/17/18 0031 Collect Enteric Bacteria and Virus Panel by LAZARO Stool Open   Order Class:  PCU Collect                  Pending Results     No orders found from 3/15/2018 to 3/18/2018.            Pending Culture Results     No orders found from 3/15/2018 to 3/18/2018.            Pending Results Instructions     If you had any lab results that were not finalized at the time of your Discharge, you can call the ED Lab Result RN at [phone]. You will be contacted by this team for any positive Lab results or changes in treatment. The nurses are available 7 days a week from 10A to 6:30P.  You can leave a message 24 hours per day and they will return your call.        Test Results From Your Hospital Stay        3/17/2018  1:11 AM      Component Results     Component Value Ref Range & Units Status    Sodium 138 133 - 144 mmol/L Final    Potassium 3.4 3.4 - 5.3 mmol/L Final    Chloride 108 94 - 109 mmol/L Final    Carbon Dioxide 21 20 - 32 mmol/L Final    Anion Gap 9 3 - 14 mmol/L Final    Glucose 102 (H) 70 - 99 mg/dL Final    Urea Nitrogen 29 7 - 30 mg/dL Final    Creatinine 0.88 0.52 - 1.04 mg/dL Final    GFR Estimate 70 >60 mL/min/1.7m2 Final    Non  GFR Calc    GFR Estimate If  Black 84 >60 mL/min/1.7m2 Final    African American GFR Calc    Calcium 9.0 8.5 - 10.1 mg/dL Final    Bilirubin Total 1.1 0.2 - 1.3 mg/dL Final    Albumin 4.2 3.4 - 5.0 g/dL Final    Protein Total 8.0 6.8 - 8.8 g/dL Final    Alkaline Phosphatase 69 40 - 150 U/L Final    ALT 20 0 - 50 U/L Final    AST 28 0 - 45 U/L Final         3/17/2018  1:09 AM      Component Results     Component Value Ref Range & Units Status    Lipase 241 73 - 393 U/L Final         3/17/2018 12:56 AM      Component Results     Component Value Ref Range & Units Status    WBC 8.2 4.0 - 11.0 10e9/L Final    RBC Count 4.31 3.8 - 5.2 10e12/L Final    Hemoglobin 14.5 11.7 - 15.7 g/dL Final    Hematocrit 40.2 35.0 - 47.0 % Final    MCV 93 78 - 100 fl Final    MCH 33.6 (H) 26.5 - 33.0 pg Final    MCHC 36.1 31.5 - 36.5 g/dL Final    RDW 12.2 10.0 - 15.0 % Final    Platelet Count 243 150 - 450 10e9/L Final    Diff Method Automated Method  Final    % Neutrophils 83.2 % Final    % Lymphocytes 3.5 % Final    % Monocytes 11.9 % Final    % Eosinophils 1.2 % Final    % Basophils 0.1 % Final    % Immature Granulocytes 0.1 % Final    Absolute Neutrophil 6.8 1.6 - 8.3 10e9/L Final    Absolute Lymphocytes 0.3 (L) 0.8 - 5.3 10e9/L Final    Absolute Monocytes 1.0 0.0 - 1.3 10e9/L Final    Absolute Eosinophils 0.1 0.0 - 0.7 10e9/L Final    Absolute Basophils 0.0 0.0 - 0.2 10e9/L Final    Abs Immature Granulocytes 0.0 0 - 0.4 10e9/L Final                Clinical Quality Measure: Blood Pressure Screening     Your blood pressure was checked while you were in the emergency department today. The last reading we obtained was  BP: 124/71 . Please read the guidelines below about what these numbers mean and what you should do about them.  If your systolic blood pressure (the top number) is less than 120 and your diastolic blood pressure (the bottom number) is less than 80, then your blood pressure is normal. There is nothing more that you need to do about it.  If your  systolic blood pressure (the top number) is 120-139 or your diastolic blood pressure (the bottom number) is 80-89, your blood pressure may be higher than it should be. You should have your blood pressure rechecked within a year by a primary care provider.  If your systolic blood pressure (the top number) is 140 or greater or your diastolic blood pressure (the bottom number) is 90 or greater, you may have high blood pressure. High blood pressure is treatable, but if left untreated over time it can put you at risk for heart attack, stroke, or kidney failure. You should have your blood pressure rechecked by a primary care provider within the next 4 weeks.  If your provider in the emergency department today gave you specific instructions to follow-up with your doctor or provider even sooner than that, you should follow that instruction and not wait for up to 4 weeks for your follow-up visit.        Thank you for choosing [facility]       Thank you for choosing [facility] for your care. Our goal is always to provide you with excellent care. Hearing back from our patients is one way we can continue to improve our services. Please take a few minutes to complete the written survey that you may receive in the mail after you visit with us. Thank you!        [employer] Information     [employer] gives you secure access to your electronic health record. If you see a primary care provider, you can also send messages to your care team and make appointments. If you have questions, please call your primary care clinic.  If you do not have a primary care provider, please call [phone] and they will assist you.        Care EveryWhere ID     This is your Care EveryWhere ID. This could be used by other organizations to access your [facility] medical records  [id number]        Equal Access to Services     DEEDEE WILSON : Bessie Steel, avelino horn, yareli miranda. So  [facility] [phone].    ATENCIÓN: Si habla español, tiene a castellanos disposición servicios gratuitos de asistencia lingüística. Llame al [phone].    We comply with applicable federal civil rights laws and [place] laws. We do not discriminate on the basis of race, color, national origin, age, disability, sex, sexual orientation, or gender identity.            After Visit Summary       This is your record. Keep this with you and show to your community pharmacist(s) and doctor(s) at your next visit.

## 2018-04-02 DIAGNOSIS — Z94.0 KIDNEY TRANSPLANTED: ICD-10-CM

## 2018-04-02 DIAGNOSIS — Z48.298 AFTERCARE FOLLOWING ORGAN TRANSPLANT: ICD-10-CM

## 2018-04-02 DIAGNOSIS — Z94.0 KIDNEY REPLACED BY TRANSPLANT: ICD-10-CM

## 2018-04-02 LAB
ANION GAP SERPL CALCULATED.3IONS-SCNC: 5 MMOL/L (ref 3–14)
BUN SERPL-MCNC: 22 MG/DL (ref 7–30)
CALCIUM SERPL-MCNC: 9 MG/DL (ref 8.5–10.1)
CHLORIDE SERPL-SCNC: 107 MMOL/L (ref 94–109)
CO2 SERPL-SCNC: 25 MMOL/L (ref 20–32)
CREAT SERPL-MCNC: 0.9 MG/DL (ref 0.52–1.04)
ERYTHROCYTE [DISTWIDTH] IN BLOOD BY AUTOMATED COUNT: 11.5 % (ref 10–15)
GFR SERPL CREATININE-BSD FRML MDRD: 68 ML/MIN/1.7M2
GLUCOSE SERPL-MCNC: 86 MG/DL (ref 70–99)
HCT VFR BLD AUTO: 40.6 % (ref 35–47)
HGB BLD-MCNC: 14.1 G/DL (ref 11.7–15.7)
MCH RBC QN AUTO: 33.4 PG (ref 26.5–33)
MCHC RBC AUTO-ENTMCNC: 34.7 G/DL (ref 31.5–36.5)
MCV RBC AUTO: 96 FL (ref 78–100)
PLATELET # BLD AUTO: 272 10E9/L (ref 150–450)
POTASSIUM SERPL-SCNC: 3.9 MMOL/L (ref 3.4–5.3)
RBC # BLD AUTO: 4.22 10E12/L (ref 3.8–5.2)
SODIUM SERPL-SCNC: 137 MMOL/L (ref 133–144)
WBC # BLD AUTO: 5.5 10E9/L (ref 4–11)

## 2018-04-02 PROCEDURE — 80048 BASIC METABOLIC PNL TOTAL CA: CPT | Performed by: INTERNAL MEDICINE

## 2018-04-02 PROCEDURE — 87799 DETECT AGENT NOS DNA QUANT: CPT | Performed by: INTERNAL MEDICINE

## 2018-04-02 PROCEDURE — 85027 COMPLETE CBC AUTOMATED: CPT | Performed by: INTERNAL MEDICINE

## 2018-04-02 PROCEDURE — 36415 COLL VENOUS BLD VENIPUNCTURE: CPT | Performed by: INTERNAL MEDICINE

## 2018-04-02 PROCEDURE — 80197 ASSAY OF TACROLIMUS: CPT | Performed by: INTERNAL MEDICINE

## 2018-04-03 LAB
EBV DNA # SPEC NAA+PROBE: ABNORMAL {COPIES}/ML
EBV DNA SPEC NAA+PROBE-LOG#: 4.1 {LOG_COPIES}/ML
TACROLIMUS BLD-MCNC: 3 UG/L (ref 5–15)
TME LAST DOSE: ABNORMAL H

## 2018-04-08 ENCOUNTER — HEALTH MAINTENANCE LETTER (OUTPATIENT)
Age: 43
End: 2018-04-08

## 2018-05-05 DIAGNOSIS — Z94.0 KIDNEY TRANSPLANTED: ICD-10-CM

## 2018-05-05 DIAGNOSIS — Z94.0 KIDNEY REPLACED BY TRANSPLANT: ICD-10-CM

## 2018-05-05 DIAGNOSIS — Z48.298 AFTERCARE FOLLOWING ORGAN TRANSPLANT: ICD-10-CM

## 2018-05-05 LAB
ANION GAP SERPL CALCULATED.3IONS-SCNC: 6 MMOL/L (ref 3–14)
BUN SERPL-MCNC: 21 MG/DL (ref 7–30)
CALCIUM SERPL-MCNC: 9 MG/DL (ref 8.5–10.1)
CHLORIDE SERPL-SCNC: 109 MMOL/L (ref 94–109)
CO2 SERPL-SCNC: 25 MMOL/L (ref 20–32)
CREAT SERPL-MCNC: 0.79 MG/DL (ref 0.52–1.04)
ERYTHROCYTE [DISTWIDTH] IN BLOOD BY AUTOMATED COUNT: 11.6 % (ref 10–15)
GFR SERPL CREATININE-BSD FRML MDRD: 78 ML/MIN/1.7M2
GLUCOSE SERPL-MCNC: 87 MG/DL (ref 70–99)
HCT VFR BLD AUTO: 39 % (ref 35–47)
HGB BLD-MCNC: 13.8 G/DL (ref 11.7–15.7)
MCH RBC QN AUTO: 34.6 PG (ref 26.5–33)
MCHC RBC AUTO-ENTMCNC: 35.4 G/DL (ref 31.5–36.5)
MCV RBC AUTO: 98 FL (ref 78–100)
PLATELET # BLD AUTO: 233 10E9/L (ref 150–450)
POTASSIUM SERPL-SCNC: 4.5 MMOL/L (ref 3.4–5.3)
RBC # BLD AUTO: 3.99 10E12/L (ref 3.8–5.2)
SODIUM SERPL-SCNC: 140 MMOL/L (ref 133–144)
TACROLIMUS BLD-MCNC: 4.4 UG/L (ref 5–15)
TME LAST DOSE: ABNORMAL H
WBC # BLD AUTO: 4.3 10E9/L (ref 4–11)

## 2018-05-08 LAB
EBV DNA # SPEC NAA+PROBE: 1239 {COPIES}/ML
EBV DNA SPEC NAA+PROBE-LOG#: 3.1 {LOG_COPIES}/ML

## 2018-05-25 DIAGNOSIS — D84.9 IMMUNOSUPPRESSED STATUS (H): ICD-10-CM

## 2018-05-25 RX ORDER — SULFAMETHOXAZOLE AND TRIMETHOPRIM 400; 80 MG/1; MG/1
TABLET ORAL
Qty: 90 TABLET | Refills: 3 | Status: SHIPPED | OUTPATIENT
Start: 2018-05-25 | End: 2018-08-31

## 2018-06-09 DIAGNOSIS — Z94.0 KIDNEY TRANSPLANTED: ICD-10-CM

## 2018-06-09 DIAGNOSIS — Z94.0 KIDNEY REPLACED BY TRANSPLANT: ICD-10-CM

## 2018-06-09 DIAGNOSIS — Z48.298 AFTERCARE FOLLOWING ORGAN TRANSPLANT: ICD-10-CM

## 2018-06-09 LAB
ANION GAP SERPL CALCULATED.3IONS-SCNC: 7 MMOL/L (ref 3–14)
BUN SERPL-MCNC: 18 MG/DL (ref 7–30)
CALCIUM SERPL-MCNC: 9.3 MG/DL (ref 8.5–10.1)
CHLORIDE SERPL-SCNC: 107 MMOL/L (ref 94–109)
CO2 SERPL-SCNC: 26 MMOL/L (ref 20–32)
CREAT SERPL-MCNC: 0.98 MG/DL (ref 0.52–1.04)
ERYTHROCYTE [DISTWIDTH] IN BLOOD BY AUTOMATED COUNT: 11.4 % (ref 10–15)
GFR SERPL CREATININE-BSD FRML MDRD: 61 ML/MIN/1.7M2
GLUCOSE SERPL-MCNC: 90 MG/DL (ref 70–99)
HCT VFR BLD AUTO: 42.7 % (ref 35–47)
HGB BLD-MCNC: 14.9 G/DL (ref 11.7–15.7)
MCH RBC QN AUTO: 34.2 PG (ref 26.5–33)
MCHC RBC AUTO-ENTMCNC: 34.9 G/DL (ref 31.5–36.5)
MCV RBC AUTO: 98 FL (ref 78–100)
PLATELET # BLD AUTO: 245 10E9/L (ref 150–450)
POTASSIUM SERPL-SCNC: 4.2 MMOL/L (ref 3.4–5.3)
RBC # BLD AUTO: 4.36 10E12/L (ref 3.8–5.2)
SODIUM SERPL-SCNC: 139 MMOL/L (ref 133–144)
TACROLIMUS BLD-MCNC: 4.5 UG/L (ref 5–15)
TME LAST DOSE: ABNORMAL H
WBC # BLD AUTO: 4.3 10E9/L (ref 4–11)

## 2018-06-11 DIAGNOSIS — Z94.0 KIDNEY REPLACED BY TRANSPLANT: ICD-10-CM

## 2018-06-11 LAB
EBV DNA # SPEC NAA+PROBE: 3998 {COPIES}/ML
EBV DNA SPEC NAA+PROBE-LOG#: 3.6 {LOG_COPIES}/ML

## 2018-06-12 RX ORDER — AZATHIOPRINE 50 MG/1
50 TABLET ORAL DAILY
Qty: 90 TABLET | Refills: 3 | Status: SHIPPED | OUTPATIENT
Start: 2018-06-12 | End: 2019-07-01

## 2018-06-30 ENCOUNTER — HOSPITAL ENCOUNTER (OUTPATIENT)
Facility: CLINIC | Age: 43
Setting detail: SPECIMEN
Discharge: HOME | End: 2018-06-30
Admitting: INTERNAL MEDICINE
Payer: OTHER GOVERNMENT

## 2018-06-30 DIAGNOSIS — Z94.0 KIDNEY TRANSPLANTED: ICD-10-CM

## 2018-06-30 PROCEDURE — 80197 ASSAY OF TACROLIMUS: CPT | Performed by: INTERNAL MEDICINE

## 2018-07-02 ENCOUNTER — OFFICE VISIT (OUTPATIENT)
Dept: NEPHROLOGY | Facility: CLINIC | Age: 43
End: 2018-07-02
Attending: INTERNAL MEDICINE
Payer: OTHER GOVERNMENT

## 2018-07-02 ENCOUNTER — RADIANT APPOINTMENT (OUTPATIENT)
Dept: ULTRASOUND IMAGING | Facility: CLINIC | Age: 43
End: 2018-07-02
Attending: INTERNAL MEDICINE
Payer: OTHER GOVERNMENT

## 2018-07-02 VITALS
OXYGEN SATURATION: 97 % | SYSTOLIC BLOOD PRESSURE: 117 MMHG | WEIGHT: 107.4 LBS | HEART RATE: 73 BPM | BODY MASS INDEX: 21.69 KG/M2 | DIASTOLIC BLOOD PRESSURE: 79 MMHG

## 2018-07-02 DIAGNOSIS — Z48.298 AFTERCARE FOLLOWING ORGAN TRANSPLANT: ICD-10-CM

## 2018-07-02 DIAGNOSIS — D75.1 RELATIVE ERYTHROCYTOSIS: ICD-10-CM

## 2018-07-02 DIAGNOSIS — Z94.0 KIDNEY REPLACED BY TRANSPLANT: Primary | ICD-10-CM

## 2018-07-02 DIAGNOSIS — Z94.0 KIDNEY REPLACED BY TRANSPLANT: ICD-10-CM

## 2018-07-02 DIAGNOSIS — D75.1 RELATIVE ERYTHROCYTOSIS: Primary | ICD-10-CM

## 2018-07-02 DIAGNOSIS — Z79.899 ENCOUNTER FOR LONG-TERM CURRENT USE OF MEDICATION: ICD-10-CM

## 2018-07-02 DIAGNOSIS — E55.9 VITAMIN D DEFICIENCY: ICD-10-CM

## 2018-07-02 DIAGNOSIS — D84.9 IMMUNOSUPPRESSED STATUS (H): ICD-10-CM

## 2018-07-02 DIAGNOSIS — B27.00 EBV (EPSTEIN-BARR VIRUS) VIREMIA: ICD-10-CM

## 2018-07-02 LAB
ANION GAP SERPL CALCULATED.3IONS-SCNC: 10 MMOL/L (ref 3–14)
BUN SERPL-MCNC: 14 MG/DL (ref 7–30)
CALCIUM SERPL-MCNC: 8.9 MG/DL (ref 8.5–10.1)
CHLORIDE SERPL-SCNC: 105 MMOL/L (ref 94–109)
CO2 SERPL-SCNC: 24 MMOL/L (ref 20–32)
CREAT SERPL-MCNC: 0.87 MG/DL (ref 0.52–1.04)
EBV DNA # SPEC NAA+PROBE: 1688 {COPIES}/ML
EBV DNA SPEC NAA+PROBE-LOG#: 3.2 {LOG_COPIES}/ML
GFR SERPL CREATININE-BSD FRML MDRD: 70 ML/MIN/1.7M2
GLUCOSE SERPL-MCNC: 68 MG/DL (ref 70–99)
POTASSIUM SERPL-SCNC: 4.3 MMOL/L (ref 3.4–5.3)
SODIUM SERPL-SCNC: 139 MMOL/L (ref 133–144)
TACROLIMUS BLD-MCNC: 5.2 UG/L (ref 5–15)
TME LAST DOSE: NORMAL H

## 2018-07-02 PROCEDURE — G0463 HOSPITAL OUTPT CLINIC VISIT: HCPCS | Mod: ZF

## 2018-07-02 ASSESSMENT — PAIN SCALES - GENERAL: PAINLEVEL: NO PAIN (0)

## 2018-07-02 NOTE — LETTER
7/2/2018      RE: Nena Underwood  [id number] [facility] [address]       Assessment and Plan:  # LDKT - baseline Cr ~ 0.9-1.0, which has remained stable.  Normal proteinuria.  No DSA.  # Immunosuppression - maintenance with tacrolimus extended release, azathioprine and prednisone.  Target tacrolimus level 3-5.  Will make no changes in immunosuppression.  # BP - well controlled at target of less than 140/90 off antihypertensive medications.  No changes.  # Vitamin D deficiency - will check vitamin D with next labs.  # EBV viremia - minimal EBV viremia in 3K range with last check.  Unremarkable CT chest/abd/pelvis 7/2017 without evidence for lymphoma.  Will continue on lower immunosuppression and check EBV PCR q3 months.  # Relative erythrocytosis - trend up in Hgb over the last year, now up to almost 15 mg/dl.  Will obtain bilateral native kidney and transplanted kidney ultrasound to rule out renal cell cancer.  # Recurrent UTIs - no recent infection and no symptoms.  # Skin cancer risk - no new skin lesions.  Recommend regular follow up with Dermatology.  # Recommend return visit in 12 months.    Assessment and plan was discussed with patient and she voiced her understanding and agreement.    Reason for Visit:  Ms. Underwood is here for routine follow up.    HPI:   Nena Underwood is a 43 year old female with ESKD from unknown etiology and is status post LDKT on 6/23/10.         Transplant Hx:       Tx: LDKT  Date: 6/23/10       Present Maintenance IS: Tacrolimus extended release (Envarsus XR), Azathioprine and Prednisone       Baseline Creatinine: 0.9-1.0       Recent DSA: No  Date last checked: 10/2017       Biopsy: No    Ms. Underwood reports feeling good overall with minimal medical complaints.  Since last clinic visit, patient reports no hospitalizations or new medical complaints and has been doing well overall.  Her energy level is good and remains normal.  She is active with his child, although doesn't get a  lot of exercise.  Denies any chest pain or shortness of breath with exertion.  Appetite is good and weight is stable.  No nausea or vomiting.  Rare loose stools, but not much of an issue.  No fever, sweats or chills.  No leg swelling.  No pain or burning with urination.    Home BP: 110-120/70-80s.      ROS:   A comprehensive review of systems was obtained and negative, except as noted in the HPI or PMH.    Active Medical Problems:  Patient Active Problem List   Diagnosis     Kidney replaced by transplant     Aftercare following organ transplant     CARDIOVASCULAR SCREENING; LDL GOAL LESS THAN 100     Immunosuppressed status (H)     Moraxella catarrhalis pneumonia (H)     Pyelonephritis     Cervical cancer screening     History of recurrent urinary tract infection     EBV (Jaime-Barr virus) viremia     Vitamin D deficiency     Personal Hx:  Social History     Social History     Marital status:      Spouse name: N/A     Number of children: N/A     Years of education: N/A     Occupational History           [employer]     Social History Main Topics     Smoking status: Never Smoker     Smokeless tobacco: Never Used     Alcohol use No     Drug use: No     Sexual activity: Yes     Partners: Male     Other Topics Concern      Service No     Blood Transfusions No     Weight Concern No     Exercise No     Bike Helmet Not Asked     na     Seat Belt Yes     Social History Narrative    Ms. Underwood emigrated from the [facility] in May, [place]. She is currently living with her  in [place], [place].  She works for the [employer] in [place] (a ).      Allergies:  Allergies   Allergen Reactions     Nkda [No Known Drug Allergies]      Medications:  Prior to Admission medications    Medication Sig Start Date End Date Taking? Authorizing Provider   azaTHIOprine (IMURAN) 50 MG tablet Take 2 tablets by mouth daily. 6/21/12  Yes Walter Chong MD   predniSONE  (DELTASONE) 5 MG tablet Take 1 tablet by mouth daily. 5/18/12  Yes Kong Renteria MD   PROGRAF 0.5 MG capsule Take 1 capsule by mouth 2 times daily. 3/5/12  Yes Kong Renteria MD     Vitals:  /79 (BP Location: Right arm)  Pulse 73  Wt 48.7 kg (107 lb 6.4 oz)  SpO2 97%  BMI 21.69 kg/m2    Exam:   GENERAL APPEARANCE: alert and no distress  HENT: mouth without ulcers or lesions  LYMPHATICS: no cervical or supraclavicular nodes  RESP: lungs clear to auscultation - no rales, rhonchi or wheezes  CV: regular rhythm, normal rate, no rub, no murmur  EDEMA: no LE edema bilaterally  ABDOMEN: soft, nontender, bowel sounds normal  MS: extremities normal - no gross deformities noted, no evidence of inflammation in joints, no muscle tenderness  SKIN: no rash  TX KIDNEY: normal    Results:   Recent Results (from the past 504 hour(s))   EBV DNA PCR Quantitative Whole Blood    Collection Time: 06/30/18 10:08 AM   Result Value Ref Range    EBV DNA Copies/mL 1688 (A) EBVNEG^EBV DNA Not Detected [Copies]/mL    EBV DNA Log of Copies 3.2 (H) <2.7 [Log_copies]/mL   Tacrolimus level    Collection Time: 06/30/18 10:08 AM   Result Value Ref Range    Tacrolimus Last Dose Not Provided     Tacrolimus Level 5.2 5.0 - 15.0 ug/L   Basic metabolic panel    Collection Time: 06/30/18 10:08 AM   Result Value Ref Range    Sodium 139 133 - 144 mmol/L    Potassium 4.3 3.4 - 5.3 mmol/L    Chloride 105 94 - 109 mmol/L    Carbon Dioxide 24 20 - 32 mmol/L    Anion Gap 10 3 - 14 mmol/L    Glucose 68 (L) 70 - 99 mg/dL    Urea Nitrogen 14 7 - 30 mg/dL    Creatinine 0.87 0.52 - 1.04 mg/dL    GFR Estimate 70 >60 mL/min/1.7m2    GFR Estimate If Black 85 >60 mL/min/1.7m2    Calcium 8.9 8.5 - 10.1 mg/dL           Walter Chong MD

## 2018-07-02 NOTE — MR AVS SNAPSHOT
After Visit Summary   7/2/2018    Nena Underwood    MRN: [id number]           Patient Information     Date Of Birth          1975        Visit Information        Provider Department      7/2/2018 4:00 PM Recipient,  Kidney/Pancreas [facility]        Today's Diagnoses     Relative erythrocytosis    -  1    Kidney replaced by transplant           Follow-ups after your visit        Follow-up notes from your care team     Return in about 1 year (around 7/2/2019).      Your next 10 appointments already scheduled     Jul 02, 2018  4:45 PM CDT   US RENAL COMPLETE with [facility]  ([facility])    [address] [id number] [phone]           Please bring a list of your medicines (including vitamins, minerals and over-the-counter drugs). Also, tell your doctor about any allergies you may have. Wear comfortable clothes and leave your valuables at home.  You do not need to do anything special to prepare for your exam.  Please call the Imaging Department at your exam site with any questions.            Jul 02, 2018  5:30 PM CDT   US RENAL TRANSPLANT with [facility] US ([facility])    [address] [id number] [phone]           Please bring a list of your medicines (including vitamins, minerals and over-the-counter drugs). Also, tell your doctor about any allergies you may have. Wear comfortable clothes and leave your valuables at home.  You do not need to do anything special to prepare for your exam.  Please call the Imaging Department at your exam site with any questions.            Jun 24, 2019  3:10 PM CDT   (Arrive by 2:40 PM)   Return Kidney Transplant with  Kidney/Pancreas Recipient   [facility] ([facility])    [address] [place] [id number] [phone]               Future tests that were ordered for you today     Open Standing Orders        Priority Remaining Interval Expires Ordered    CBC with platelets Routine 4/4 Every 3 Months 7/2/2019 7/2/2018    Basic metabolic panel Routine 4/4 Every 3 Months 7/2/2019 7/2/2018    Tacrolimus level Routine 4/4 Every 3 Months 7/2/2019 7/2/2018    Protein  random urine with Creat Ratio Routine 2/2 Every 6 Months 7/2/2019 7/2/2018          Open Future Orders        Priority Expected Expires Ordered    US Retroperitoneal complete Routine  7/2/2019 7/2/2018            Who to contact     If you have questions or need follow up information about today's clinic visit or your schedule please contact [facility] directly at [phone].  Normal or non-critical lab and imaging results will be communicated to you by Qewzhart, letter or phone within 4 business days after the clinic has received the results. If you do not hear from us within 7 days, please contact the clinic through Actively Learnt or phone. If you have a critical or abnormal lab result, we will notify you by phone as soon as possible.  Submit refill requests through TraNet'te or call your pharmacy and they will forward the refill request to us. Please allow 3 business days for your refill to be completed.          Additional Information About Your Visit        TraNet'te Information     TraNet'te gives you secure access to your electronic health record. If you see a primary care provider, you can also send messages to your care team and make appointments. If you have questions, please call your primary care clinic.  If you do not have a primary care provider, please call [phone] and they will assist you.        Care EveryWhere ID     This is your Care EveryWhere ID. This could be used by other organizations to access your [facility] medical records  [id number]        Your Vitals Were     Pulse Pulse Oximetry BMI (Body Mass Index)             73 97% 21.69 kg/m2          Blood  Pressure from Last 3 Encounters:   07/02/18 117/79   03/16/18 124/71   01/09/18 119/82    Weight from Last 3 Encounters:   07/02/18 48.7 kg (107 lb 6.4 oz)   03/16/18 48.1 kg (106 lb)   01/09/18 48.3 kg (106 lb 6.4 oz)              We Performed the Following      renal transplant        Primary Care Provider Office Phone # Fax #    Jorge L Tucker [phone]       [address] [place] [id number]        Equal Access to Services     [facility]: Hadii aad ku hadasho Soomaali, waaxda luqadaha, qaybta kaalmada adeegyada, yareli wallace . So [facility] [phone].    ATENCIÓN: Si habla español, tiene a castellanos disposición servicios gratuitos de asistencia lingüística. Llame al [phone].    We comply with applicable federal civil rights laws and [place] laws. We do not discriminate on the basis of race, color, national origin, age, disability, sex, sexual orientation, or gender identity.            Thank you!     Thank you for choosing [facility]  for your care. Our goal is always to provide you with excellent care. Hearing back from our patients is one way we can continue to improve our services. Please take a few minutes to complete the written survey that you may receive in the mail after your visit with us. Thank you!             Your Updated Medication List - Protect others around you: Learn how to safely use, store and throw away your medicines at www.disposemymeds.org.          This list is accurate as of 7/2/18  4:28 PM.  Always use your most recent med list.                   Brand Name Dispense Instructions for use Diagnosis    azaTHIOprine 50 MG tablet    IMURAN    90 tablet    Take 1 tablet (50 mg) by mouth daily    Kidney replaced by transplant       etonogestrel 68 MG Impl    IMPLANON/NEXPLANON    1 each    1 each (68 mg) by Subdermal route once for 1 dose    Insertion of implantable subdermal contraceptive       * predniSONE 5 MG tablet     DELTASONE    90 tablet    Take 1 tablet (5 mg) by mouth daily    Kidney transplanted       * predniSONE 5 MG tablet    DELTASONE    90 tablet    TAKE ONE TABLET BY MOUTH EVERY DAY    Kidney replaced by transplant       * sulfamethoxazole-trimethoprim 400-80 MG per tablet    BACTRIM/SEPTRA    90 tablet    Take 1 tablet by mouth daily    Immunosuppressed status (H)       * sulfamethoxazole-trimethoprim 400-80 MG per tablet    BACTRIM/SEPTRA    90 tablet    TAKE ONE TABLET BY MOUTH EVERY DAY    Immunosuppressed status (H)       tacrolimus 1 MG 24 hr tablet    ENVARSUS XR    30 tablet    Take 1 tablet (1 mg) by mouth every morning (before breakfast)    Kidney replaced by transplant       TYLENOL PO      Take 500 mg by mouth every 4 hours as needed for mild pain or fever        * Notice:  This list has 4 medication(s) that are the same as other medications prescribed for you. Read the directions carefully, and ask your doctor or other care provider to review them with you.

## 2018-07-02 NOTE — PROGRESS NOTES
Chief Complaint   Patient presents with     RECHECK     follow up 6 month kidney tx     /79 (BP Location: Right arm)  Pulse 73  Wt 48.7 kg (107 lb 6.4 oz)  SpO2 97%  BMI 21.69 kg/m2   Fiorella Kc, CMA

## 2018-07-05 PROBLEM — E55.9 VITAMIN D DEFICIENCY: Status: ACTIVE | Noted: 2018-07-05

## 2018-07-05 NOTE — PROGRESS NOTES
Assessment and Plan:  # LDKT - baseline Cr ~ 0.9-1.0, which has remained stable.  Normal proteinuria.  No DSA.  # Immunosuppression - maintenance with tacrolimus extended release, azathioprine and prednisone.  Target tacrolimus level 3-5.  Will make no changes in immunosuppression.  # BP - well controlled at target of less than 140/90 off antihypertensive medications.  No changes.  # Vitamin D deficiency - will check vitamin D with next labs.  # EBV viremia - minimal EBV viremia in 3K range with last check.  Unremarkable CT chest/abd/pelvis 7/2017 without evidence for lymphoma.  Will continue on lower immunosuppression and check EBV PCR q3 months.  # Relative erythrocytosis - trend up in Hgb over the last year, now up to almost 15 mg/dl.  Will obtain bilateral native kidney and transplanted kidney ultrasound to rule out renal cell cancer.  # Recurrent UTIs - no recent infection and no symptoms.  # Skin cancer risk - no new skin lesions.  Recommend regular follow up with Dermatology.  # Recommend return visit in 12 months.    Assessment and plan was discussed with patient and she voiced her understanding and agreement.    Reason for Visit:  Ms. Underwood is here for routine follow up.    HPI:   Nena Underwood is a 43 year old female with ESKD from unknown etiology and is status post LDKT on 6/23/10.         Transplant Hx:       Tx: LDKT  Date: 6/23/10       Present Maintenance IS: Tacrolimus extended release (Envarsus XR), Azathioprine and Prednisone       Baseline Creatinine: 0.9-1.0       Recent DSA: No  Date last checked: 10/2017       Biopsy: No    Ms. Underwood reports feeling good overall with minimal medical complaints.  Since last clinic visit, patient reports no hospitalizations or new medical complaints and has been doing well overall.  Her energy level is good and remains normal.  She is active with his child, although doesn't get a lot of exercise.  Denies any chest pain or shortness of breath with exertion.   Appetite is good and weight is stable.  No nausea or vomiting.  Rare loose stools, but not much of an issue.  No fever, sweats or chills.  No leg swelling.  No pain or burning with urination.    Home BP: 110-120/70-80s.      ROS:   A comprehensive review of systems was obtained and negative, except as noted in the HPI or PMH.    Active Medical Problems:  Patient Active Problem List   Diagnosis     Kidney replaced by transplant     Aftercare following organ transplant     CARDIOVASCULAR SCREENING; LDL GOAL LESS THAN 100     Immunosuppressed status (H)     Moraxella catarrhalis pneumonia (H)     Pyelonephritis     Cervical cancer screening     History of recurrent urinary tract infection     EBV (Jaime-Barr virus) viremia     Vitamin D deficiency     Personal Hx:  Social History     Social History     Marital status:      Spouse name: N/A     Number of children: N/A     Years of education: N/A     Occupational History           [employer]     Social History Main Topics     Smoking status: Never Smoker     Smokeless tobacco: Never Used     Alcohol use No     Drug use: No     Sexual activity: Yes     Partners: Male     Other Topics Concern      Service No     Blood Transfusions No     Weight Concern No     Exercise No     Bike Helmet Not Asked     na     Seat Belt Yes     Social History Narrative    Ms. Underwood emigrated from the [facility] in May, [place]. She is currently living with her  in [place], [place].  She works for the [employer] in [place] (a ).      Allergies:  Allergies   Allergen Reactions     Nkda [No Known Drug Allergies]      Medications:  Prior to Admission medications    Medication Sig Start Date End Date Taking? Authorizing Provider   azaTHIOprine (IMURAN) 50 MG tablet Take 2 tablets by mouth daily. 6/21/12  Yes Walter Chong MD   predniSONE (DELTASONE) 5 MG tablet Take 1 tablet by mouth daily. 5/18/12  Yes Kong Renteria,  MD   PROGRAF 0.5 MG capsule Take 1 capsule by mouth 2 times daily. 3/5/12  Yes Kong Renteria MD     Vitals:  /79 (BP Location: Right arm)  Pulse 73  Wt 48.7 kg (107 lb 6.4 oz)  SpO2 97%  BMI 21.69 kg/m2    Exam:   GENERAL APPEARANCE: alert and no distress  HENT: mouth without ulcers or lesions  LYMPHATICS: no cervical or supraclavicular nodes  RESP: lungs clear to auscultation - no rales, rhonchi or wheezes  CV: regular rhythm, normal rate, no rub, no murmur  EDEMA: no LE edema bilaterally  ABDOMEN: soft, nontender, bowel sounds normal  MS: extremities normal - no gross deformities noted, no evidence of inflammation in joints, no muscle tenderness  SKIN: no rash  TX KIDNEY: normal    Results:   Recent Results (from the past 504 hour(s))   EBV DNA PCR Quantitative Whole Blood    Collection Time: 06/30/18 10:08 AM   Result Value Ref Range    EBV DNA Copies/mL 1688 (A) EBVNEG^EBV DNA Not Detected [Copies]/mL    EBV DNA Log of Copies 3.2 (H) <2.7 [Log_copies]/mL   Tacrolimus level    Collection Time: 06/30/18 10:08 AM   Result Value Ref Range    Tacrolimus Last Dose Not Provided     Tacrolimus Level 5.2 5.0 - 15.0 ug/L   Basic metabolic panel    Collection Time: 06/30/18 10:08 AM   Result Value Ref Range    Sodium 139 133 - 144 mmol/L    Potassium 4.3 3.4 - 5.3 mmol/L    Chloride 105 94 - 109 mmol/L    Carbon Dioxide 24 20 - 32 mmol/L    Anion Gap 10 3 - 14 mmol/L    Glucose 68 (L) 70 - 99 mg/dL    Urea Nitrogen 14 7 - 30 mg/dL    Creatinine 0.87 0.52 - 1.04 mg/dL    GFR Estimate 70 >60 mL/min/1.7m2    GFR Estimate If Black 85 >60 mL/min/1.7m2    Calcium 8.9 8.5 - 10.1 mg/dL

## 2018-08-31 ENCOUNTER — OFFICE VISIT (OUTPATIENT)
Dept: FAMILY MEDICINE | Facility: CLINIC | Age: 43
End: 2018-08-31
Payer: OTHER GOVERNMENT

## 2018-08-31 VITALS
SYSTOLIC BLOOD PRESSURE: 110 MMHG | OXYGEN SATURATION: 99 % | BODY MASS INDEX: 21.21 KG/M2 | HEART RATE: 75 BPM | DIASTOLIC BLOOD PRESSURE: 75 MMHG | WEIGHT: 105 LBS

## 2018-08-31 DIAGNOSIS — D84.9 IMMUNOSUPPRESSED STATUS (H): ICD-10-CM

## 2018-08-31 DIAGNOSIS — Z00.00 ROUTINE HISTORY AND PHYSICAL EXAMINATION OF ADULT: Primary | ICD-10-CM

## 2018-08-31 DIAGNOSIS — M25.562 ACUTE PAIN OF LEFT KNEE: ICD-10-CM

## 2018-08-31 DIAGNOSIS — Z48.298 AFTERCARE FOLLOWING ORGAN TRANSPLANT: ICD-10-CM

## 2018-08-31 DIAGNOSIS — Z94.0 KIDNEY REPLACED BY TRANSPLANT: ICD-10-CM

## 2018-08-31 DIAGNOSIS — J15.69 MORAXELLA CATARRHALIS PNEUMONIA (H): ICD-10-CM

## 2018-08-31 DIAGNOSIS — Z79.899 ENCOUNTER FOR LONG-TERM CURRENT USE OF MEDICATION: ICD-10-CM

## 2018-08-31 LAB
ANION GAP SERPL CALCULATED.3IONS-SCNC: 10 MMOL/L (ref 3–14)
BUN SERPL-MCNC: 14 MG/DL (ref 7–30)
CALCIUM SERPL-MCNC: 9 MG/DL (ref 8.5–10.1)
CHLORIDE SERPL-SCNC: 109 MMOL/L (ref 94–109)
CHOLEST SERPL-MCNC: 166 MG/DL
CO2 SERPL-SCNC: 23 MMOL/L (ref 20–32)
CREAT SERPL-MCNC: 0.81 MG/DL (ref 0.52–1.04)
CREAT UR-MCNC: 76 MG/DL
ERYTHROCYTE [DISTWIDTH] IN BLOOD BY AUTOMATED COUNT: 11.2 % (ref 10–15)
GFR SERPL CREATININE-BSD FRML MDRD: 76 ML/MIN/1.7M2
GLUCOSE SERPL-MCNC: 86 MG/DL (ref 70–99)
GLUCOSE SERPL-MCNC: 91 MG/DL (ref 70–99)
HCT VFR BLD AUTO: 40.4 % (ref 35–47)
HDLC SERPL-MCNC: 63 MG/DL
HGB BLD-MCNC: 14.1 G/DL (ref 11.7–15.7)
LDLC SERPL CALC-MCNC: 89 MG/DL
MCH RBC QN AUTO: 34.1 PG (ref 26.5–33)
MCHC RBC AUTO-ENTMCNC: 34.9 G/DL (ref 31.5–36.5)
MCV RBC AUTO: 98 FL (ref 78–100)
NONHDLC SERPL-MCNC: 103 MG/DL
PLATELET # BLD AUTO: 257 10E9/L (ref 150–450)
POTASSIUM SERPL-SCNC: 4 MMOL/L (ref 3.4–5.3)
PROT UR-MCNC: 0.07 G/L
PROT/CREAT 24H UR: 0.09 G/G CR (ref 0–0.2)
RBC # BLD AUTO: 4.14 10E12/L (ref 3.8–5.2)
SODIUM SERPL-SCNC: 142 MMOL/L (ref 133–144)
TACROLIMUS BLD-MCNC: 6.5 UG/L (ref 5–15)
TME LAST DOSE: NORMAL H
TRIGL SERPL-MCNC: 68 MG/DL
WBC # BLD AUTO: 5.5 10E9/L (ref 4–11)

## 2018-08-31 PROCEDURE — 84156 ASSAY OF PROTEIN URINE: CPT | Performed by: INTERNAL MEDICINE

## 2018-08-31 PROCEDURE — 99213 OFFICE O/P EST LOW 20 MIN: CPT | Mod: 25 | Performed by: INTERNAL MEDICINE

## 2018-08-31 PROCEDURE — 80061 LIPID PANEL: CPT | Performed by: INTERNAL MEDICINE

## 2018-08-31 PROCEDURE — 80048 BASIC METABOLIC PNL TOTAL CA: CPT | Performed by: INTERNAL MEDICINE

## 2018-08-31 PROCEDURE — 36415 COLL VENOUS BLD VENIPUNCTURE: CPT | Performed by: INTERNAL MEDICINE

## 2018-08-31 PROCEDURE — 99396 PREV VISIT EST AGE 40-64: CPT | Performed by: INTERNAL MEDICINE

## 2018-08-31 PROCEDURE — 82947 ASSAY GLUCOSE BLOOD QUANT: CPT | Performed by: INTERNAL MEDICINE

## 2018-08-31 PROCEDURE — 80197 ASSAY OF TACROLIMUS: CPT | Performed by: INTERNAL MEDICINE

## 2018-08-31 PROCEDURE — G0145 SCR C/V CYTO,THINLAYER,RESCR: HCPCS | Performed by: INTERNAL MEDICINE

## 2018-08-31 PROCEDURE — 85027 COMPLETE CBC AUTOMATED: CPT | Performed by: INTERNAL MEDICINE

## 2018-08-31 PROCEDURE — 87624 HPV HI-RISK TYP POOLED RSLT: CPT | Performed by: INTERNAL MEDICINE

## 2018-08-31 NOTE — MR AVS SNAPSHOT
After Visit Summary   8/31/2018    Nena Underwood    MRN: [id number]           Patient Information     Date Of Birth          1975        Visit Information        Provider Department      8/31/2018 8:00 AM Miguelina Ocasio MD [facility] [place]        Today's Diagnoses     Routine history and physical examination of adult    -  1    Kidney replaced by transplant        Immunosuppressed status (H)        Moraxella catarrhalis pneumonia (H)          Care Instructions      Preventive Health Recommendations  Female Ages 40 to 49    Yearly exam:     See your health care provider every year in order to  1. Review health changes.   2. Discuss preventive care.    3. Review your medicines if your doctor prescribed any.      Get a Pap test every three years (unless you have an abnormal result and your provider advises testing more often).      If you get Pap tests with HPV test, you only need to test every 5 years, unless you have an abnormal result. You do not need a Pap test if your uterus was removed (hysterectomy) and you have not had cancer.      You should be tested each year for STDs (sexually transmitted diseases), if you're at risk.     Ask your doctor if you should have a mammogram.      Have a colonoscopy (test for colon cancer) if someone in your family has had colon cancer or polyps before age 50.       Have a cholesterol test every 5 years.       Have a diabetes test (fasting glucose) after age 45. If you are at risk for diabetes, you should have this test every 3 years.    Shots: Get a flu shot each year. Get a tetanus shot every 10 years.     Nutrition:     Eat at least 5 servings of fruits and vegetables each day.    Eat whole-grain bread, whole-wheat pasta and brown rice instead of white grains and rice.    Get adequate Calcium and Vitamin D.      Lifestyle    Exercise at least 150 minutes a week (an average of 30 minutes a day, 5 days a week). This will help you control  your weight and prevent disease.    Limit alcohol to one drink per day.    No smoking.     Wear sunscreen to prevent skin cancer.    See your dentist every six months for an exam and cleaning.          Follow-ups after your visit        Follow-up notes from your care team     Return in about 1 year (around 8/31/2019) for Physical Exam.      Your next 10 appointments already scheduled     Jun 24, 2019  3:05 PM CDT   (Arrive by 2:35 PM)   Return Kidney Transplant with [facility] Kidney/Pancreas Recipient   [facility] ([facility] [place])    [address]  [address]  [place] [address] [phone]              Who to contact     If you have questions or need follow up information about today's clinic visit or your schedule please contact [facility] directly at [phone].  Normal or non-critical lab and imaging results will be communicated to you by MyChart, letter or phone within 4 business days after the clinic has received the results. If you do not hear from us within 7 days, please contact the clinic through [facility] or phone. If you have a critical or abnormal lab result, we will notify you by phone as soon as possible.  Submit refill requests through [facility] or call your pharmacy and they will forward the refill request to us. Please allow 3 business days for your refill to be completed.          Additional Information About Your Visit        [facility] Information     [facility] gives you secure access to your electronic health record. If you see a primary care provider, you can also send messages to your care team and make appointments. If you have questions, please call your primary care clinic.  If you do not have a primary care provider, please call [phone] and they will assist you.        Care EveryWhere ID     This is your Care EveryWhere ID. This could be used by other organizations to access your [facility] medical records  [id number]        Your  Vitals Were     Pulse Last Period Pulse Oximetry BMI (Body Mass Index)          75 08/24/2018 99% 21.21 kg/m2         Blood Pressure from Last 3 Encounters:   08/31/18 110/75   07/02/18 117/79   03/16/18 124/71    Weight from Last 3 Encounters:   08/31/18 105 lb (47.6 kg)   07/02/18 107 lb 6.4 oz (48.7 kg)   03/16/18 106 lb (48.1 kg)              We Performed the Following     Glucose     HPV High Risk Types DNA Cervical     Lipid panel reflex to direct LDL Fasting     Pap imaged thin layer screen with HPV - recommended age 30 - 65 years (select HPV order below)        Primary Care Provider Office Phone # Fax #    Jorge L Tucker [phone]       [address] [facility] [facility] [address]        Equal Access to Services     DEEDEE WILSON : Hadii benji michaels Soglynn, waaxda luqadaha, qaybta kaalmatrent kimbrough, yareli wallace . So [facility] [phone].    ATENCIÓN: Si habla español, tiene a castellanos disposición servicios gratuitos de asistencia lingüística. Leena al [phone].    We comply with applicable federal civil rights laws and [place] laws. We do not discriminate on the basis of race, color, national origin, age, disability, sex, sexual orientation, or gender identity.            Thank you!     Thank you for choosing [facility]  for your care. Our goal is always to provide you with excellent care. Hearing back from our patients is one way we can continue to improve our services. Please take a few minutes to complete the written survey that you may receive in the mail after your visit with us. Thank you!             Your Updated Medication List - Protect others around you: Learn how to safely use, store and throw away your medicines at www.disposemymeds.org.          This list is accurate as of 8/31/18  8:41 AM.  Always use your most recent med list.                   Brand Name Dispense Instructions for use Diagnosis    azaTHIOprine 50 MG tablet     IMURAN    90 tablet    Take 1 tablet (50 mg) by mouth daily    Kidney replaced by transplant       etonogestrel 68 MG Impl    IMPLANON/NEXPLANON    1 each    1 each (68 mg) by Subdermal route once for 1 dose    Insertion of implantable subdermal contraceptive       predniSONE 5 MG tablet    DELTASONE    90 tablet    Take 1 tablet (5 mg) by mouth daily    Kidney transplanted       tacrolimus 1 MG 24 hr tablet    ENVARSUS XR    30 tablet    Take 1 tablet (1 mg) by mouth every morning (before breakfast)    Kidney replaced by transplant       TYLENOL PO      Take 500 mg by mouth every 4 hours as needed for mild pain or fever

## 2018-08-31 NOTE — PROGRESS NOTES
SUBJECTIVE:   CC: Nena Underwood is an 43 year old woman who presents for preventive health visit.     Healthy Habits:    Do you get at least three servings of calcium containing foods daily (dairy, green leafy vegetables, etc.)? No     Amount of exercise or daily activities, outside of work: no     Problems taking medications regularly No    Medication side effects: No    Have you had an eye exam in the past two years? yes    Do you see a dentist twice per year? yes    Do you have sleep apnea, excessive snoring or daytime drowsiness?no    Nena is a 42 y/o female with history of renal agenesis who is s/p bilateral kidney transplant in 2010. She takes Prednisone 5 mg, Tacrolimus 1 mg daily, and azathioprine 50 mg daily.    Nexplanon placed 5/2017. Has light spotting for her menstrual cycle.    Left knee pain when knee is in a fully flexed position and then straightened. Maren is a  so has to sit on the floor a lot.      Today's PHQ-2 Score:   PHQ-2 ( 1999 Pfizer) 8/31/2018 3/30/2017   Q1: Little interest or pleasure in doing things 0 0   Q2: Feeling down, depressed or hopeless 0 0   PHQ-2 Score 0 0       Abuse: Current or Past(Physical, Sexual or Emotional)- No  Do you feel safe in your environment - Yes    Social History   Substance Use Topics     Smoking status: Never Smoker     Smokeless tobacco: Never Used     Alcohol use No     If you drink alcohol do you typically have >3 drinks per day or >7 drinks per week? No                     Reviewed orders with patient.  Reviewed health maintenance and updated orders accordingly - Yes  BP Readings from Last 3 Encounters:   08/31/18 110/75   07/02/18 117/79   03/16/18 124/71    Wt Readings from Last 3 Encounters:   08/31/18 105 lb (47.6 kg)   07/02/18 107 lb 6.4 oz (48.7 kg)   03/16/18 106 lb (48.1 kg)                    Patient under age 50, mutual decision reflected in health maintenance.      Pertinent mammograms are reviewed under the  imaging tab.  History of abnormal Pap smear: NO - age 30- 65 PAP every 3 years recommended  PAP / HPV Latest Ref Rng & Units 2/2/2017 6/25/2014 10/29/2012   PAP - NIL NIL NIL   HPV 16 DNA NEG Negative - -   HPV 18 DNA NEG Negative - -   OTHER HR HPV NEG Negative - -     Reviewed and updated as needed this visit by clinical staff  Tobacco  Allergies  Meds         Reviewed and updated as needed this visit by Provider            ROS:  CONSTITUTIONAL: NEGATIVE for fever, chills, change in weight  INTEGUMENTARU/SKIN: NEGATIVE for worrisome rashes, moles or lesions  EYES: NEGATIVE for vision changes or irritation  ENT: NEGATIVE for ear, mouth and throat problems  RESP: NEGATIVE for significant cough or SOB  BREAST: NEGATIVE for masses, tenderness or discharge  CV: NEGATIVE for chest pain, palpitations or peripheral edema  GI: NEGATIVE for nausea, abdominal pain, heartburn, or change in bowel habits  : NEGATIVE for unusual urinary or vaginal symptoms. Periods are regular.  MUSCULOSKELETAL: NEGATIVE for significant arthralgias or myalgia  NEURO: NEGATIVE for weakness, dizziness or paresthesias  PSYCHIATRIC: NEGATIVE for changes in mood or affect    OBJECTIVE:   /75 (BP Location: Right arm)  Pulse 75  Wt 105 lb (47.6 kg)  LMP 08/24/2018  SpO2 99%  BMI 21.21 kg/m2  EXAM:  GENERAL: healthy, alert and no distress  EYES: Eyes grossly normal to inspection, PERRL and conjunctivae and sclerae normal  HENT: ear canals and TM's normal, nose and mouth without ulcers or lesions  NECK: no adenopathy, no asymmetry, masses, or scars and thyroid normal to palpation  RESP: lungs clear to auscultation - no rales, rhonchi or wheezes  BREAST: normal without masses, tenderness or nipple discharge and no palpable axillary masses or adenopathy  CV: regular rate and rhythm, normal S1 S2, no S3 or S4, no murmur, click or rub, no peripheral edema and peripheral pulses strong  ABDOMEN: soft, nontender, no hepatosplenomegaly, no masses  "and bowel sounds normal  MS: no gross musculoskeletal defects noted, no edema  SKIN: no suspicious lesions or rashes  NEURO: Normal strength and tone, mentation intact and speech normal  PSYCH: mentation appears normal, affect normal/bright    Diagnostic Test Results:  none     ASSESSMENT/PLAN:   1. Routine history and physical examination of adult  - Pap imaged thin layer screen with HPV - recommended age 30 - 65 years (select HPV order below)  - HPV High Risk Types DNA Cervical  - Lipid panel reflex to direct LDL Fasting  - Glucose    2. Kidney replaced by transplant  For renal agenesis. Continue current immunosuppressives.     3. Immunosuppressed status (H)    5. Acute pain of left knee  Likely some chondromalacia secondary to positional strain placed on the patella.  Recommending avoidance of prolonged sitting on the floor.  She could try to bring in chair into the  room to be sitting on instead.  Can ice it if it is bothering her or take NSAIDs.  Physical therapy would always be an option as well.      COUNSELING:   Reviewed preventive health counseling, as reflected in patient instructions       Regular exercise       Healthy diet/nutrition       Osteoporosis Prevention/Bone Health    BP Readings from Last 1 Encounters:   08/31/18 110/75     Estimated body mass index is 21.21 kg/(m^2) as calculated from the following:    Height as of 3/16/18: 4' 11\" (1.499 m).    Weight as of this encounter: 105 lb (47.6 kg).           reports that she has never smoked. She has never used smokeless tobacco.      Counseling Resources:  ATP IV Guidelines  Pooled Cohorts Equation Calculator  Breast Cancer Risk Calculator  FRAX Risk Assessment  ICSI Preventive Guidelines  Dietary Guidelines for Americans, 2010  USDA's MyPlate  ASA Prophylaxis  Lung CA Screening    Miguelina Ocasio MD  [facility]  "

## 2018-09-04 ENCOUNTER — TELEPHONE (OUTPATIENT)
Dept: TRANSPLANT | Facility: CLINIC | Age: 43
End: 2018-09-04

## 2018-09-04 DIAGNOSIS — Z94.0 KIDNEY TRANSPLANTED: Primary | ICD-10-CM

## 2018-09-04 NOTE — TELEPHONE ENCOUNTER
Called and left a v/m for pt. To return call to sot office to discuss tac level.     ISSUE:  Tac level 6.5, goal 3-5  Current dose Tacrolimus 1 mg daily.    PLAN:  Call and confirm a good 24 hour trough and current dose of 1 mg daily.  Any recent illness/diarrhea/medication changes?  Plan to repeat level in 1-2 weeks.

## 2018-09-05 LAB
COPATH REPORT: NORMAL
PAP: NORMAL

## 2018-09-05 NOTE — TELEPHONE ENCOUNTER
Called and left detailed v/m for pt. regarding tacrolimus level of 6.5.  Asked pt. to return call to sot office if she has had any recent illness/diarrhea/medication changes, otherwise plan to repeat level next week with a good 24 hour trough.  Order placed.

## 2018-09-07 LAB
FINAL DIAGNOSIS: NORMAL
HPV HR 12 DNA CVX QL NAA+PROBE: NEGATIVE
HPV16 DNA SPEC QL NAA+PROBE: NEGATIVE
HPV18 DNA SPEC QL NAA+PROBE: NEGATIVE
SPECIMEN DESCRIPTION: NORMAL
SPECIMEN SOURCE CVX/VAG CYTO: NORMAL

## 2018-09-18 ENCOUNTER — TELEPHONE (OUTPATIENT)
Dept: FAMILY MEDICINE | Facility: CLINIC | Age: 43
End: 2018-09-18

## 2018-09-18 NOTE — TELEPHONE ENCOUNTER
Since she has no history of abnormal pap smears and is monogamous with her  I believe every 3 year screening is fine for her. Thanks.

## 2018-09-18 NOTE — TELEPHONE ENCOUNTER
Leonidas Vega, Ninfa,  42 yo pt with current pap that is NIL with Neg HPV. Pt is immunocompromised, post kidney transplant in 2010.. Do you advise more frequent pap screenings?    Thanks!  Lynette Nissen RN

## 2018-10-03 DIAGNOSIS — Z94.0 KIDNEY TRANSPLANTED: ICD-10-CM

## 2018-10-03 PROCEDURE — 36415 COLL VENOUS BLD VENIPUNCTURE: CPT | Performed by: INTERNAL MEDICINE

## 2018-10-03 PROCEDURE — 80197 ASSAY OF TACROLIMUS: CPT | Performed by: INTERNAL MEDICINE

## 2018-10-04 LAB
TACROLIMUS BLD-MCNC: 5.3 UG/L (ref 5–15)
TME LAST DOSE: NORMAL H

## 2018-10-06 ENCOUNTER — TRANSFERRED RECORDS (OUTPATIENT)
Dept: HEALTH INFORMATION MANAGEMENT | Facility: CLINIC | Age: 43
End: 2018-10-06

## 2018-11-19 DIAGNOSIS — Z94.0 KIDNEY REPLACED BY TRANSPLANT: Primary | ICD-10-CM

## 2018-12-26 DIAGNOSIS — Z48.298 AFTERCARE FOLLOWING ORGAN TRANSPLANT: ICD-10-CM

## 2018-12-26 DIAGNOSIS — Z94.0 KIDNEY REPLACED BY TRANSPLANT: ICD-10-CM

## 2018-12-26 DIAGNOSIS — Z79.899 ENCOUNTER FOR LONG-TERM CURRENT USE OF MEDICATION: ICD-10-CM

## 2018-12-26 LAB
ERYTHROCYTE [DISTWIDTH] IN BLOOD BY AUTOMATED COUNT: 11.4 % (ref 10–15)
HCT VFR BLD AUTO: 41.6 % (ref 35–47)
HGB BLD-MCNC: 14.4 G/DL (ref 11.7–15.7)
MCH RBC QN AUTO: 34 PG (ref 26.5–33)
MCHC RBC AUTO-ENTMCNC: 34.6 G/DL (ref 31.5–36.5)
MCV RBC AUTO: 98 FL (ref 78–100)
PLATELET # BLD AUTO: 247 10E9/L (ref 150–450)
PROT UR-MCNC: 0.08 G/L
PROT/CREAT 24H UR: 0.09 G/G CR (ref 0–0.2)
RBC # BLD AUTO: 4.24 10E12/L (ref 3.8–5.2)
WBC # BLD AUTO: 4.5 10E9/L (ref 4–11)

## 2018-12-26 PROCEDURE — 85027 COMPLETE CBC AUTOMATED: CPT | Performed by: INTERNAL MEDICINE

## 2018-12-26 PROCEDURE — 84156 ASSAY OF PROTEIN URINE: CPT | Performed by: INTERNAL MEDICINE

## 2018-12-26 PROCEDURE — 36415 COLL VENOUS BLD VENIPUNCTURE: CPT | Performed by: INTERNAL MEDICINE

## 2018-12-26 PROCEDURE — 80197 ASSAY OF TACROLIMUS: CPT | Performed by: INTERNAL MEDICINE

## 2018-12-26 PROCEDURE — 80048 BASIC METABOLIC PNL TOTAL CA: CPT | Performed by: INTERNAL MEDICINE

## 2018-12-27 LAB
ANION GAP SERPL CALCULATED.3IONS-SCNC: 6 MMOL/L (ref 3–14)
BUN SERPL-MCNC: 18 MG/DL (ref 7–30)
CALCIUM SERPL-MCNC: 9.3 MG/DL (ref 8.5–10.1)
CHLORIDE SERPL-SCNC: 107 MMOL/L (ref 94–109)
CO2 SERPL-SCNC: 24 MMOL/L (ref 20–32)
CREAT SERPL-MCNC: 1.02 MG/DL (ref 0.52–1.04)
GFR SERPL CREATININE-BSD FRML MDRD: 66 ML/MIN/{1.73_M2}
GLUCOSE SERPL-MCNC: 104 MG/DL (ref 70–99)
POTASSIUM SERPL-SCNC: 3.9 MMOL/L (ref 3.4–5.3)
SODIUM SERPL-SCNC: 137 MMOL/L (ref 133–144)
TACROLIMUS BLD-MCNC: 5.5 UG/L (ref 5–15)
TME LAST DOSE: NORMAL H

## 2019-01-14 DIAGNOSIS — Z94.0 KIDNEY REPLACED BY TRANSPLANT: Primary | ICD-10-CM

## 2019-03-20 DIAGNOSIS — Z94.0 KIDNEY REPLACED BY TRANSPLANT: ICD-10-CM

## 2019-03-20 DIAGNOSIS — Z79.899 ENCOUNTER FOR LONG-TERM CURRENT USE OF MEDICATION: ICD-10-CM

## 2019-03-20 DIAGNOSIS — Z48.298 AFTERCARE FOLLOWING ORGAN TRANSPLANT: ICD-10-CM

## 2019-03-20 LAB
ANION GAP SERPL CALCULATED.3IONS-SCNC: 7 MMOL/L (ref 3–14)
BUN SERPL-MCNC: 22 MG/DL (ref 7–30)
CALCIUM SERPL-MCNC: 8.8 MG/DL (ref 8.5–10.1)
CHLORIDE SERPL-SCNC: 108 MMOL/L (ref 94–109)
CO2 SERPL-SCNC: 24 MMOL/L (ref 20–32)
CREAT SERPL-MCNC: 1.02 MG/DL (ref 0.52–1.04)
ERYTHROCYTE [DISTWIDTH] IN BLOOD BY AUTOMATED COUNT: 11.5 % (ref 10–15)
GFR SERPL CREATININE-BSD FRML MDRD: 66 ML/MIN/{1.73_M2}
GLUCOSE SERPL-MCNC: 80 MG/DL (ref 70–99)
HCT VFR BLD AUTO: 41.7 % (ref 35–47)
HGB BLD-MCNC: 14.3 G/DL (ref 11.7–15.7)
MCH RBC QN AUTO: 33.8 PG (ref 26.5–33)
MCHC RBC AUTO-ENTMCNC: 34.3 G/DL (ref 31.5–36.5)
MCV RBC AUTO: 99 FL (ref 78–100)
PLATELET # BLD AUTO: 263 10E9/L (ref 150–450)
POTASSIUM SERPL-SCNC: 3.7 MMOL/L (ref 3.4–5.3)
RBC # BLD AUTO: 4.23 10E12/L (ref 3.8–5.2)
SODIUM SERPL-SCNC: 139 MMOL/L (ref 133–144)
TACROLIMUS BLD-MCNC: 4.9 UG/L (ref 5–15)
TME LAST DOSE: ABNORMAL H
WBC # BLD AUTO: 4.2 10E9/L (ref 4–11)

## 2019-03-20 PROCEDURE — 85027 COMPLETE CBC AUTOMATED: CPT | Performed by: INTERNAL MEDICINE

## 2019-03-20 PROCEDURE — 36415 COLL VENOUS BLD VENIPUNCTURE: CPT | Performed by: INTERNAL MEDICINE

## 2019-03-20 PROCEDURE — 80197 ASSAY OF TACROLIMUS: CPT | Performed by: INTERNAL MEDICINE

## 2019-03-20 PROCEDURE — 80048 BASIC METABOLIC PNL TOTAL CA: CPT | Performed by: INTERNAL MEDICINE

## 2019-03-30 DIAGNOSIS — Z94.0 KIDNEY REPLACED BY TRANSPLANT: ICD-10-CM

## 2019-03-30 DIAGNOSIS — Z94.0 KIDNEY TRANSPLANTED: Primary | ICD-10-CM

## 2019-03-30 RX ORDER — PREDNISONE 5 MG/1
5 TABLET ORAL DAILY
Qty: 90 TABLET | Refills: 3 | Status: CANCELLED | OUTPATIENT
Start: 2019-03-30

## 2019-03-30 NOTE — TELEPHONE ENCOUNTER
Drug: Prednisone  Last Fill Date: 12/19/2018  Quantity: 90        Drug: Envarsus  Last Fill Date: 3/1/2019  Quantity: 5

## 2019-04-01 RX ORDER — PREDNISONE 5 MG/1
5 TABLET ORAL DAILY
Qty: 90 TABLET | Refills: 3 | Status: SHIPPED | OUTPATIENT
Start: 2019-04-01 | End: 2020-03-16

## 2019-06-25 DIAGNOSIS — Z94.0 KIDNEY REPLACED BY TRANSPLANT: ICD-10-CM

## 2019-06-25 DIAGNOSIS — Z48.298 AFTERCARE FOLLOWING ORGAN TRANSPLANT: ICD-10-CM

## 2019-06-25 DIAGNOSIS — Z79.899 ENCOUNTER FOR LONG-TERM CURRENT USE OF MEDICATION: ICD-10-CM

## 2019-06-25 LAB
ERYTHROCYTE [DISTWIDTH] IN BLOOD BY AUTOMATED COUNT: 11.4 % (ref 10–15)
HCT VFR BLD AUTO: 39.5 % (ref 35–47)
HGB BLD-MCNC: 14.3 G/DL (ref 11.7–15.7)
MCH RBC QN AUTO: 34.7 PG (ref 26.5–33)
MCHC RBC AUTO-ENTMCNC: 36.2 G/DL (ref 31.5–36.5)
MCV RBC AUTO: 96 FL (ref 78–100)
PLATELET # BLD AUTO: 256 10E9/L (ref 150–450)
RBC # BLD AUTO: 4.12 10E12/L (ref 3.8–5.2)
TACROLIMUS BLD-MCNC: 5.5 UG/L (ref 5–15)
TME LAST DOSE: NORMAL H
WBC # BLD AUTO: 5.6 10E9/L (ref 4–11)

## 2019-06-25 PROCEDURE — 80197 ASSAY OF TACROLIMUS: CPT | Performed by: INTERNAL MEDICINE

## 2019-06-25 PROCEDURE — 85027 COMPLETE CBC AUTOMATED: CPT | Performed by: INTERNAL MEDICINE

## 2019-06-25 PROCEDURE — 36415 COLL VENOUS BLD VENIPUNCTURE: CPT | Performed by: INTERNAL MEDICINE

## 2019-06-29 DIAGNOSIS — Z94.0 KIDNEY REPLACED BY TRANSPLANT: Primary | ICD-10-CM

## 2019-07-01 ENCOUNTER — OFFICE VISIT (OUTPATIENT)
Dept: NEPHROLOGY | Facility: CLINIC | Age: 44
End: 2019-07-01
Attending: INTERNAL MEDICINE
Payer: COMMERCIAL

## 2019-07-01 VITALS
RESPIRATION RATE: 16 BRPM | SYSTOLIC BLOOD PRESSURE: 113 MMHG | HEIGHT: 59 IN | HEART RATE: 70 BPM | TEMPERATURE: 98.3 F | WEIGHT: 109.4 LBS | OXYGEN SATURATION: 96 % | DIASTOLIC BLOOD PRESSURE: 78 MMHG | BODY MASS INDEX: 22.05 KG/M2

## 2019-07-01 DIAGNOSIS — E55.9 VITAMIN D DEFICIENCY: ICD-10-CM

## 2019-07-01 DIAGNOSIS — Z94.0 KIDNEY TRANSPLANTED: ICD-10-CM

## 2019-07-01 DIAGNOSIS — Z94.0 KIDNEY TRANSPLANTED: Primary | ICD-10-CM

## 2019-07-01 DIAGNOSIS — Z48.298 AFTERCARE FOLLOWING ORGAN TRANSPLANT: Primary | ICD-10-CM

## 2019-07-01 DIAGNOSIS — Z48.298 AFTERCARE FOLLOWING ORGAN TRANSPLANT: ICD-10-CM

## 2019-07-01 DIAGNOSIS — Z94.0 KIDNEY REPLACED BY TRANSPLANT: ICD-10-CM

## 2019-07-01 LAB
ALBUMIN SERPL-MCNC: 4 G/DL (ref 3.4–5)
ALBUMIN UR-MCNC: NEGATIVE MG/DL
ANION GAP SERPL CALCULATED.3IONS-SCNC: 5 MMOL/L (ref 3–14)
APPEARANCE UR: CLEAR
BACTERIA #/AREA URNS HPF: ABNORMAL /HPF
BILIRUB UR QL STRIP: NEGATIVE
BUN SERPL-MCNC: 19 MG/DL (ref 7–30)
CALCIUM SERPL-MCNC: 8.9 MG/DL (ref 8.5–10.1)
CHLORIDE SERPL-SCNC: 105 MMOL/L (ref 94–109)
CO2 SERPL-SCNC: 26 MMOL/L (ref 20–32)
COLOR UR AUTO: ABNORMAL
CREAT SERPL-MCNC: 0.84 MG/DL (ref 0.52–1.04)
CREAT UR-MCNC: 24 MG/DL
ERYTHROCYTE [DISTWIDTH] IN BLOOD BY AUTOMATED COUNT: 11.6 % (ref 10–15)
GFR SERPL CREATININE-BSD FRML MDRD: 83 ML/MIN/{1.73_M2}
GLUCOSE SERPL-MCNC: 93 MG/DL (ref 70–99)
GLUCOSE UR STRIP-MCNC: NEGATIVE MG/DL
HCT VFR BLD AUTO: 41.5 % (ref 35–47)
HGB BLD-MCNC: 14.1 G/DL (ref 11.7–15.7)
HGB UR QL STRIP: ABNORMAL
KETONES UR STRIP-MCNC: NEGATIVE MG/DL
LEUKOCYTE ESTERASE UR QL STRIP: NEGATIVE
MCH RBC QN AUTO: 33.8 PG (ref 26.5–33)
MCHC RBC AUTO-ENTMCNC: 34 G/DL (ref 31.5–36.5)
MCV RBC AUTO: 100 FL (ref 78–100)
NITRATE UR QL: NEGATIVE
PH UR STRIP: 5 PH (ref 5–7)
PHOSPHATE SERPL-MCNC: 2.8 MG/DL (ref 2.5–4.5)
PLATELET # BLD AUTO: 258 10E9/L (ref 150–450)
POTASSIUM SERPL-SCNC: 4.1 MMOL/L (ref 3.4–5.3)
PROT UR-MCNC: <0.05 G/L
PROT/CREAT 24H UR: NORMAL G/G CR (ref 0–0.2)
RBC # BLD AUTO: 4.17 10E12/L (ref 3.8–5.2)
RBC #/AREA URNS AUTO: <1 /HPF (ref 0–2)
SODIUM SERPL-SCNC: 136 MMOL/L (ref 133–144)
SOURCE: ABNORMAL
SP GR UR STRIP: 1 (ref 1–1.03)
SQUAMOUS #/AREA URNS AUTO: 1 /HPF (ref 0–1)
UROBILINOGEN UR STRIP-MCNC: 0 MG/DL (ref 0–2)
WBC # BLD AUTO: 6.4 10E9/L (ref 4–11)
WBC #/AREA URNS AUTO: 1 /HPF (ref 0–5)

## 2019-07-01 PROCEDURE — 80197 ASSAY OF TACROLIMUS: CPT | Performed by: INTERNAL MEDICINE

## 2019-07-01 PROCEDURE — 82306 VITAMIN D 25 HYDROXY: CPT | Performed by: INTERNAL MEDICINE

## 2019-07-01 PROCEDURE — 80069 RENAL FUNCTION PANEL: CPT | Performed by: INTERNAL MEDICINE

## 2019-07-01 PROCEDURE — G0463 HOSPITAL OUTPT CLINIC VISIT: HCPCS | Mod: ZF

## 2019-07-01 PROCEDURE — 84156 ASSAY OF PROTEIN URINE: CPT | Performed by: INTERNAL MEDICINE

## 2019-07-01 PROCEDURE — 81001 URINALYSIS AUTO W/SCOPE: CPT | Performed by: INTERNAL MEDICINE

## 2019-07-01 PROCEDURE — 36415 COLL VENOUS BLD VENIPUNCTURE: CPT | Performed by: INTERNAL MEDICINE

## 2019-07-01 PROCEDURE — 85027 COMPLETE CBC AUTOMATED: CPT | Performed by: INTERNAL MEDICINE

## 2019-07-01 RX ORDER — AZATHIOPRINE 50 MG/1
50 TABLET ORAL DAILY
Qty: 90 TABLET | Refills: 0 | Status: SHIPPED | OUTPATIENT
Start: 2019-07-01 | End: 2019-07-01

## 2019-07-01 RX ORDER — AZATHIOPRINE 50 MG/1
50 TABLET ORAL DAILY
Qty: 90 TABLET | Refills: 11 | Status: SHIPPED | OUTPATIENT
Start: 2019-07-01 | End: 2020-09-16

## 2019-07-01 RX ORDER — SULFAMETHOXAZOLE AND TRIMETHOPRIM 400; 80 MG/1; MG/1
1 TABLET ORAL DAILY
COMMUNITY
Start: 2019-03-30 | End: 2020-10-01

## 2019-07-01 ASSESSMENT — MIFFLIN-ST. JEOR: SCORE: 1041.87

## 2019-07-01 ASSESSMENT — PAIN SCALES - GENERAL: PAINLEVEL: NO PAIN (0)

## 2019-07-01 NOTE — NURSING NOTE
Nena Underwood was seen today in clinic by this writer. Medications, lab orders, lab frequency, and necessary follow up discussed with patient. Patient was provided with a copy of the current lab letter. Patient voiced understanding and agreement of education and plan.     Minoo Odonnell

## 2019-07-01 NOTE — NURSING NOTE
"Chief Complaint   Patient presents with     RECHECK     S/P Kidney TX 6/23/2010       Vital signs:  Temp: 98.3  F (36.8  C) Temp src: Oral BP: 113/78 Pulse: 70   Resp: 16 SpO2: 96 %     Height: 149.9 cm (4' 11\") Weight: 49.6 kg (109 lb 6.4 oz)  Estimated body mass index is 22.1 kg/m  as calculated from the following:    Height as of this encounter: 1.499 m (4' 11\").    Weight as of this encounter: 49.6 kg (109 lb 6.4 oz).          Nahed Adkins [facility]  7/1/2019 3:48 PM      "

## 2019-07-01 NOTE — LETTER
7/1/2019       RE: Nena Underwood  [id number] [place] [address]     Dear Colleague,    Thank you for referring your patient, Nena Underwood, to the [facility]. Please see a copy of my visit note below.    CHRONIC TRANSPLANT NEPHROLOGY VISIT    Assessment & Plan   # LDKT: Stable   - Baseline Cr ~ 1.02 mg/dL   - Proteinuria: Normal (<0.2 grams)   - Date DSA Last Checked: Not Known       Latest DSA: No   - BK Viremia: Not checked recently   - Kidney Tx Biopsy: No    # Immunosuppression: Tacrolimus immediate release (goal 3-5), Azathioprine (dose Take 1 tablet (50 mg) by mouth daily ) and Prednisone (dose 5 mg daily)   - Changes: No    # Prophylaxis:   - PJP: None    # Hypertension: Controlled; Goal BP: < 130/80   - Changes: No    # Anemia in Chronic Renal Disease: Hgb: Stable      JUDITH: No   - Iron studies: Not checked recently    # Mineral Bone Disorder:   - Secondary renal hyperparathyroidism; PTH level: Not checked recently   - Vitamin D; level: Not checked recently   - Calcium; level: Normal   - Phosphorus; level: Not checked recently     # Electrolytes:   - Potassium; level: Normal   - Magnesium; level: Not checked recently   - Bicarbonate; level: Normal   - Sodium; level: Normal    # Skin Cancer Risk:    - Discussed sun protection and recommend regular follow up with Dermatology.    # Medical Compliance: Yes    # Transplant History:  Etiology of kidney failure: Unknown etiology  Tx: LDKT  Transplant: 6/23/2010 (Kidney)  Donor Type: Living Donor Class:   Significant changes in immunosuppression: None  Significant transplant-related complications: None    Transplant Office Phone Number: [phone]    Assessment and plan was discussed with the patient and she voiced her understanding and agreement.    Return visit: No follow-ups on file.    Allie Hayes    Chief Complaint   Ms. Underwood is a 44 year old here for routine follow up. She was  last evaluated on 7/2/18 by Dr. Chong. At the time no changes were made to medication.     History of Present Illness     Today, the patient is doing well. Since removing her IUD she has had no UTIs. She also broke her ankle last summer. She has never seen a dermatologist, and she is up to date with pap smears and mammograms. She has not had a colonoscopy.     Recent Hospitalizations:  [x] No [] Yes    New Medical Issues: [x] No [] Yes    Decreased energy: [x] No [] Yes    Chest pain or SOB with exertion:  [x] No [] Yes    Appetite change or weight change: [x] No [] Yes    Nausea, vomiting or diarrhea:  [x] No [] Yes    Fever, sweats or chills: [x] No [] Yes    Leg swelling: [x] No [] Yes      Home BP: Not checked    Review of Systems   A comprehensive review of systems was obtained and negative, except as noted in the HPI or PMH.    Problem List   Patient Active Problem List   Diagnosis     Kidney replaced by transplant     Aftercare following organ transplant     CARDIOVASCULAR SCREENING; LDL GOAL LESS THAN 100     Immunosuppressed status (H)     Moraxella catarrhalis pneumonia (H)     Pyelonephritis     Cervical cancer screening     History of recurrent urinary tract infection     EBV (Jaime-Barr virus) viremia     Vitamin D deficiency       Social History   Social History     Tobacco Use     Smoking status: Never Smoker     Smokeless tobacco: Never Used   Substance Use Topics     Alcohol use: No     Alcohol/week: 0.0 oz     Drug use: No       Allergies   Allergies   Allergen Reactions     Nkda [No Known Drug Allergies]        Medications   Current Outpatient Medications   Medication Sig     Acetaminophen (TYLENOL PO) Take 500 mg by mouth every 4 hours as needed for mild pain or fever     azaTHIOprine (IMURAN) 50 MG tablet Take 1 tablet (50 mg) by mouth daily Labs required for refills     etonogestrel (IMPLANON/NEXPLANON) 68 MG IMPL 1 each (68 mg) by Subdermal route once for 1 dose     predniSONE (DELTASONE) 5  "MG tablet Take 5 mg by mouth daily.     predniSONE (DELTASONE) 5 MG tablet Take 1 tablet (5 mg) by mouth daily     tacrolimus (ENVARSUS XR) 1 MG 24 hr tablet Take 1 tablet (1 mg) by mouth every morning (before breakfast)     No current facility-administered medications for this visit.      There are no discontinued medications.    Physical Exam   Vital Signs: /78 (BP Location: Right arm, Patient Position: Sitting, Cuff Size: Adult Regular)   Pulse 70   Temp 98.3  F (36.8  C) (Oral)   Resp 16   Ht 1.499 m (4' 11\")   Wt 49.6 kg (109 lb 6.4 oz)   SpO2 96%   BMI 22.10 kg/m       GENERAL APPEARANCE: alert and no distress  HENT: mouth without ulcers or lesions  LYMPHATICS: no cervical or supraclavicular nodes  RESP: lungs clear to auscultation - no rales, rhonchi or wheezes  CV: regular rhythm, normal rate, no rub, no murmur  EDEMA: no LE edema bilaterally  ABDOMEN: soft, nondistended, nontender, bowel sounds normal  MS: extremities normal - no gross deformities noted, no evidence of inflammation in joints, no muscle tenderness  SKIN: no rash  - No lumps or bumps  - No edema    Data     Renal Latest Ref Rng & Units 3/20/2019 12/26/2018 8/31/2018   Na 133 - 144 mmol/L 139 137 -   K 3.4 - 5.3 mmol/L 3.7 3.9 -   Cl 94 - 109 mmol/L 108 107 -   CO2 20 - 32 mmol/L 24 24 -   BUN 7 - 30 mg/dL 22 18 -   Cr 0.52 - 1.04 mg/dL 1.02 1.02 -   Glucose 70 - 99 mg/dL 80 104(H) 91   Ca  8.5 - 10.1 mg/dL 8.8 9.3 -   Mg 1.6 - 2.3 mg/dL - - -     Bone Health Latest Ref Rng & Units 6/14/2017 3/7/2015 3/6/2015   Phos 2.5 - 4.5 mg/dL 3.2 2.7 1.9(L)   PTHi 12 - 72 pg/mL - - -   Vit D Def 30 - 75 ug/L - - -     Heme Latest Ref Rng & Units 6/25/2019 3/20/2019 12/26/2018   WBC 4.0 - 11.0 10e9/L 5.6 4.2 4.5   Hgb 11.7 - 15.7 g/dL 14.3 14.3 14.4   Plt 150 - 450 10e9/L 256 263 247     Liver Latest Ref Rng & Units 3/17/2018 6/14/2017 3/30/2017   AP 40 - 150 U/L 69 - 89   TBili 0.2 - 1.3 mg/dL 1.1 - 0.3   DBili 0.0 - 0.2 mg/dL - - -   ALT 0 " - 50 U/L 20 - 22   AST 0 - 45 U/L 28 - 30   Tot Protein 6.8 - 8.8 g/dL 8.0 - 7.7   Albumin 3.4 - 5.0 g/dL 4.2 3.6 3.4     Pancreas Latest Ref Rng & Units 3/17/2018 10/2/2015 9/27/2015   A1C 4.3 - 6.0 % - - -   Lipase 73 - 393 U/L 241 208 210        UMP Txp Virology Latest Ref Rng & Units 2/17/2018 1/6/2018 12/16/2017   CMV IgG EU/mL - - -   CVM DNA Quant - - - -   CMV Quant <100 Copies/mL - - -   CMV QT Log <2.0 Log copies/mL - - -   BK Spec - Plasma Plasma Plasma   BK Res BKNEG:BK Virus DNA Not Detected copies/mL BK Virus DNA Not Detected BK Virus DNA Not Detected BK Virus DNA Not Detected   BK Log <2.7 Log copies/mL Not Calculated Not Calculated Not Calculated   EBV IgG - - - -   Hep B Core NEG - - -   Hep B Surf - - - -   HIV 1&2 NEG - - -        Recent Labs   Lab Test 12/26/18  1141 03/20/19  0742 06/25/19  0746   DOSTAC 12/25/2018 AT 1000 AM 03/19/2019 AT 7AM 06/24/2019 at 0710 am   TACROL 5.5 4.9* 5.5     Recent Labs   Lab Test 05/08/12  0824 06/19/12  0815 07/16/12  0807   DOSMPA edta EDTA EDTA   MPACID 4.27* 6.64* <0.25*   MPAG 31.6 39.4 <6.5*     Scribe Disclosure:  I, Allie Hayes, am serving as a scribe to document services personally performed by Dr. Rubin at this visit, based upon the provider's statements to me. All documentation has been reviewed by the aforementioned provider prior to being entered into the official medical record.       Again, thank you for allowing me to participate in the care of your patient.      Sincerely,    Kidney/Pancreas Recipient

## 2019-07-01 NOTE — PROGRESS NOTES
CHRONIC TRANSPLANT NEPHROLOGY VISIT    Assessment & Plan   # LDKT: Stable   - Baseline Cr ~ 1.02 mg/dL   - Proteinuria: Normal (<0.2 grams)   - Date DSA Last Checked: Not Known       Latest DSA: No   - BK Viremia: Not checked recently   - Kidney Tx Biopsy: No    # Immunosuppression: Tacrolimus immediate release (goal 3-5), Azathioprine (dose Take 1 tablet (50 mg) by mouth daily ) and Prednisone (dose 5 mg daily)   - Changes: No    # Prophylaxis:   - PJP: None    # Hypertension: Controlled; Goal BP: < 130/80   - Changes: No    # Anemia in Chronic Renal Disease: Hgb: Stable      JUDITH: No   - Iron studies: Not checked recently    # Mineral Bone Disorder:   - Secondary renal hyperparathyroidism; PTH level: Not checked recently   - Vitamin D; level: Not checked recently   - Calcium; level: Normal   - Phosphorus; level: Not checked recently     # Electrolytes:   - Potassium; level: Normal   - Magnesium; level: Not checked recently   - Bicarbonate; level: Normal   - Sodium; level: Normal    # Skin Cancer Risk:    - Discussed sun protection and recommend regular follow up with Dermatology.    # Medical Compliance: Yes    # Transplant History:  Etiology of kidney failure: Unknown etiology  Tx: LDKT  Transplant: 6/23/2010 (Kidney)  Donor Type: Living Donor Class:   Significant changes in immunosuppression: None  Significant transplant-related complications: None    Transplant Office Phone Number: [phone]    Assessment and plan was discussed with the patient and she voiced her understanding and agreement.    Return visit: No follow-ups on file.    Allie Hayes    Chief Complaint   Ms. Underwood is a 44 year old here for routine follow up. She was last evaluated on 7/2/18 by Dr. Chong. At the time no changes were made to medication.     History of Present Illness     Today, the patient is doing well. Since removing her IUD she has had no UTIs. She also broke her ankle last summer. She has never seen a dermatologist, and she  is up to date with pap smears and mammograms. She has not had a colonoscopy.     Recent Hospitalizations:  [x] No [] Yes    New Medical Issues: [x] No [] Yes    Decreased energy: [x] No [] Yes    Chest pain or SOB with exertion:  [x] No [] Yes    Appetite change or weight change: [x] No [] Yes    Nausea, vomiting or diarrhea:  [x] No [] Yes    Fever, sweats or chills: [x] No [] Yes    Leg swelling: [x] No [] Yes      Home BP: Not checked    Review of Systems   A comprehensive review of systems was obtained and negative, except as noted in the HPI or PMH.    Problem List   Patient Active Problem List   Diagnosis     Kidney replaced by transplant     Aftercare following organ transplant     CARDIOVASCULAR SCREENING; LDL GOAL LESS THAN 100     Immunosuppressed status (H)     Moraxella catarrhalis pneumonia (H)     Pyelonephritis     Cervical cancer screening     History of recurrent urinary tract infection     EBV (Jaime-Barr virus) viremia     Vitamin D deficiency       Social History   Social History     Tobacco Use     Smoking status: Never Smoker     Smokeless tobacco: Never Used   Substance Use Topics     Alcohol use: No     Alcohol/week: 0.0 oz     Drug use: No       Allergies   Allergies   Allergen Reactions     Nkda [No Known Drug Allergies]        Medications   Current Outpatient Medications   Medication Sig     Acetaminophen (TYLENOL PO) Take 500 mg by mouth every 4 hours as needed for mild pain or fever     azaTHIOprine (IMURAN) 50 MG tablet Take 1 tablet (50 mg) by mouth daily Labs required for refills     etonogestrel (IMPLANON/NEXPLANON) 68 MG IMPL 1 each (68 mg) by Subdermal route once for 1 dose     predniSONE (DELTASONE) 5 MG tablet Take 5 mg by mouth daily.     predniSONE (DELTASONE) 5 MG tablet Take 1 tablet (5 mg) by mouth daily     tacrolimus (ENVARSUS XR) 1 MG 24 hr tablet Take 1 tablet (1 mg) by mouth every morning (before breakfast)     No current facility-administered medications for this  "visit.      There are no discontinued medications.    Physical Exam   Vital Signs: /78 (BP Location: Right arm, Patient Position: Sitting, Cuff Size: Adult Regular)   Pulse 70   Temp 98.3  F (36.8  C) (Oral)   Resp 16   Ht 1.499 m (4' 11\")   Wt 49.6 kg (109 lb 6.4 oz)   SpO2 96%   BMI 22.10 kg/m      GENERAL APPEARANCE: alert and no distress  HENT: mouth without ulcers or lesions  LYMPHATICS: no cervical or supraclavicular nodes  RESP: lungs clear to auscultation - no rales, rhonchi or wheezes  CV: regular rhythm, normal rate, no rub, no murmur  EDEMA: no LE edema bilaterally  ABDOMEN: soft, nondistended, nontender, bowel sounds normal  MS: extremities normal - no gross deformities noted, no evidence of inflammation in joints, no muscle tenderness  SKIN: no rash  - No lumps or bumps  - No edema    Data     Renal Latest Ref Rng & Units 3/20/2019 12/26/2018 8/31/2018   Na 133 - 144 mmol/L 139 137 -   K 3.4 - 5.3 mmol/L 3.7 3.9 -   Cl 94 - 109 mmol/L 108 107 -   CO2 20 - 32 mmol/L 24 24 -   BUN 7 - 30 mg/dL 22 18 -   Cr 0.52 - 1.04 mg/dL 1.02 1.02 -   Glucose 70 - 99 mg/dL 80 104(H) 91   Ca  8.5 - 10.1 mg/dL 8.8 9.3 -   Mg 1.6 - 2.3 mg/dL - - -     Bone Health Latest Ref Rng & Units 6/14/2017 3/7/2015 3/6/2015   Phos 2.5 - 4.5 mg/dL 3.2 2.7 1.9(L)   PTHi 12 - 72 pg/mL - - -   Vit D Def 30 - 75 ug/L - - -     Heme Latest Ref Rng & Units 6/25/2019 3/20/2019 12/26/2018   WBC 4.0 - 11.0 10e9/L 5.6 4.2 4.5   Hgb 11.7 - 15.7 g/dL 14.3 14.3 14.4   Plt 150 - 450 10e9/L 256 263 247     Liver Latest Ref Rng & Units 3/17/2018 6/14/2017 3/30/2017   AP 40 - 150 U/L 69 - 89   TBili 0.2 - 1.3 mg/dL 1.1 - 0.3   DBili 0.0 - 0.2 mg/dL - - -   ALT 0 - 50 U/L 20 - 22   AST 0 - 45 U/L 28 - 30   Tot Protein 6.8 - 8.8 g/dL 8.0 - 7.7   Albumin 3.4 - 5.0 g/dL 4.2 3.6 3.4     Pancreas Latest Ref Rng & Units 3/17/2018 10/2/2015 9/27/2015   A1C 4.3 - 6.0 % - - -   Lipase 73 - 393 U/L 241 208 210        [facility] Txp Virology Latest Ref Rng " & Units 2/17/2018 1/6/2018 12/16/2017   CMV IgG EU/mL - - -   CVM DNA Quant - - - -   CMV Quant <100 Copies/mL - - -   CMV QT Log <2.0 Log copies/mL - - -   BK Spec - Plasma Plasma Plasma   BK Res BKNEG:BK Virus DNA Not Detected copies/mL BK Virus DNA Not Detected BK Virus DNA Not Detected BK Virus DNA Not Detected   BK Log <2.7 Log copies/mL Not Calculated Not Calculated Not Calculated   EBV IgG - - - -   Hep B Core NEG - - -   Hep B Surf - - - -   HIV 1&2 NEG - - -        Recent Labs   Lab Test 12/26/18  1141 03/20/19  0742 06/25/19  0746   DOSTAC 12/25/2018 AT 1000 AM 03/19/2019 AT 7AM 06/24/2019 at 0710 am   TACROL 5.5 4.9* 5.5     Recent Labs   Lab Test 05/08/12  0824 06/19/12  0815 07/16/12  0807   DOSMPA edta EDTA EDTA   MPACID 4.27* 6.64* <0.25*   MPAG 31.6 39.4 <6.5*     Scribe Disclosure:  I, Allie Hayes, am serving as a scribe to document services personally performed by Dr. Rubin at this visit, based upon the provider's statements to me. All documentation has been reviewed by the aforementioned provider prior to being entered into the official medical record.

## 2019-07-01 NOTE — LETTER
7/1/2019      RE: Nena Underwood  [id number] [place] [address]       CHRONIC TRANSPLANT NEPHROLOGY VISIT    Assessment & Plan   # LDKT: Stable   - Baseline Cr ~ 1.02 mg/dL   - Proteinuria: Normal (<0.2 grams)   - Date DSA Last Checked: Not Known       Latest DSA: No   - BK Viremia: Not checked recently   - Kidney Tx Biopsy: No    # Immunosuppression: Tacrolimus immediate release (goal 3-5), Azathioprine (dose Take 1 tablet (50 mg) by mouth daily ) and Prednisone (dose 5 mg daily)   - Changes: No    # Prophylaxis:   - PJP: None    # Hypertension: Controlled; Goal BP: < 130/80   - Changes: No    # Anemia in Chronic Renal Disease: Hgb: Stable      JUDITH: No   - Iron studies: Not checked recently    # Mineral Bone Disorder:   - Secondary renal hyperparathyroidism; PTH level: Not checked recently   - Vitamin D; level: Not checked recently   - Calcium; level: Normal   - Phosphorus; level: Not checked recently     # Electrolytes:   - Potassium; level: Normal   - Magnesium; level: Not checked recently   - Bicarbonate; level: Normal   - Sodium; level: Normal    # Skin Cancer Risk:    - Discussed sun protection and recommend regular follow up with Dermatology.    # Medical Compliance: Yes    # Transplant History:  Etiology of kidney failure: Unknown etiology  Tx: LDKT  Transplant: 6/23/2010 (Kidney)  Donor Type: Living Donor Class:   Significant changes in immunosuppression: None  Significant transplant-related complications: None    Transplant Office Phone Number: [phone]    Assessment and plan was discussed with the patient and she voiced her understanding and agreement.    Return visit: No follow-ups on file.    Allie Hayes    Chief Complaint   Ms. Underwood is a 44 year old here for routine follow up. She was last evaluated on 7/2/18 by Dr. Chong. At the time no changes were made to medication.     History of Present Illness     Today, the patient is doing well. Since removing her IUD she has had no UTIs.  She also broke her ankle last summer. She has never seen a dermatologist, and she is up to date with pap smears and mammograms. She has not had a colonoscopy.     Recent Hospitalizations:  [x] No [] Yes    New Medical Issues: [x] No [] Yes    Decreased energy: [x] No [] Yes    Chest pain or SOB with exertion:  [x] No [] Yes    Appetite change or weight change: [x] No [] Yes    Nausea, vomiting or diarrhea:  [x] No [] Yes    Fever, sweats or chills: [x] No [] Yes    Leg swelling: [x] No [] Yes      Home BP: Not checked    Review of Systems   A comprehensive review of systems was obtained and negative, except as noted in the HPI or PMH.    Problem List   Patient Active Problem List   Diagnosis     Kidney replaced by transplant     Aftercare following organ transplant     CARDIOVASCULAR SCREENING; LDL GOAL LESS THAN 100     Immunosuppressed status (H)     Moraxella catarrhalis pneumonia (H)     Pyelonephritis     Cervical cancer screening     History of recurrent urinary tract infection     EBV (Jaime-Barr virus) viremia     Vitamin D deficiency       Social History   Social History     Tobacco Use     Smoking status: Never Smoker     Smokeless tobacco: Never Used   Substance Use Topics     Alcohol use: No     Alcohol/week: 0.0 oz     Drug use: No       Allergies   Allergies   Allergen Reactions     Nkda [No Known Drug Allergies]        Medications   Current Outpatient Medications   Medication Sig     Acetaminophen (TYLENOL PO) Take 500 mg by mouth every 4 hours as needed for mild pain or fever     azaTHIOprine (IMURAN) 50 MG tablet Take 1 tablet (50 mg) by mouth daily Labs required for refills     etonogestrel (IMPLANON/NEXPLANON) 68 MG IMPL 1 each (68 mg) by Subdermal route once for 1 dose     predniSONE (DELTASONE) 5 MG tablet Take 5 mg by mouth daily.     predniSONE (DELTASONE) 5 MG tablet Take 1 tablet (5 mg) by mouth daily     tacrolimus (ENVARSUS XR) 1 MG 24 hr tablet Take 1 tablet (1 mg) by mouth every  "morning (before breakfast)     No current facility-administered medications for this visit.      There are no discontinued medications.    Physical Exam   Vital Signs: /78 (BP Location: Right arm, Patient Position: Sitting, Cuff Size: Adult Regular)   Pulse 70   Temp 98.3  F (36.8  C) (Oral)   Resp 16   Ht 1.499 m (4' 11\")   Wt 49.6 kg (109 lb 6.4 oz)   SpO2 96%   BMI 22.10 kg/m       GENERAL APPEARANCE: alert and no distress  HENT: mouth without ulcers or lesions  LYMPHATICS: no cervical or supraclavicular nodes  RESP: lungs clear to auscultation - no rales, rhonchi or wheezes  CV: regular rhythm, normal rate, no rub, no murmur  EDEMA: no LE edema bilaterally  ABDOMEN: soft, nondistended, nontender, bowel sounds normal  MS: extremities normal - no gross deformities noted, no evidence of inflammation in joints, no muscle tenderness  SKIN: no rash  - No lumps or bumps  - No edema    Data     Renal Latest Ref Rng & Units 3/20/2019 12/26/2018 8/31/2018   Na 133 - 144 mmol/L 139 137 -   K 3.4 - 5.3 mmol/L 3.7 3.9 -   Cl 94 - 109 mmol/L 108 107 -   CO2 20 - 32 mmol/L 24 24 -   BUN 7 - 30 mg/dL 22 18 -   Cr 0.52 - 1.04 mg/dL 1.02 1.02 -   Glucose 70 - 99 mg/dL 80 104(H) 91   Ca  8.5 - 10.1 mg/dL 8.8 9.3 -   Mg 1.6 - 2.3 mg/dL - - -     Bone Health Latest Ref Rng & Units 6/14/2017 3/7/2015 3/6/2015   Phos 2.5 - 4.5 mg/dL 3.2 2.7 1.9(L)   PTHi 12 - 72 pg/mL - - -   Vit D Def 30 - 75 ug/L - - -     Heme Latest Ref Rng & Units 6/25/2019 3/20/2019 12/26/2018   WBC 4.0 - 11.0 10e9/L 5.6 4.2 4.5   Hgb 11.7 - 15.7 g/dL 14.3 14.3 14.4   Plt 150 - 450 10e9/L 256 263 247     Liver Latest Ref Rng & Units 3/17/2018 6/14/2017 3/30/2017   AP 40 - 150 U/L 69 - 89   TBili 0.2 - 1.3 mg/dL 1.1 - 0.3   DBili 0.0 - 0.2 mg/dL - - -   ALT 0 - 50 U/L 20 - 22   AST 0 - 45 U/L 28 - 30   Tot Protein 6.8 - 8.8 g/dL 8.0 - 7.7   Albumin 3.4 - 5.0 g/dL 4.2 3.6 3.4     Pancreas Latest Ref Rng & Units 3/17/2018 10/2/2015 9/27/2015   A1C 4.3 " - 6.0 % - - -   Lipase 73 - 393 U/L 241 208 210        [facility] Txp Virology Latest Ref Rng & Units 2/17/2018 1/6/2018 12/16/2017   CMV IgG EU/mL - - -   CVM DNA Quant - - - -   CMV Quant <100 Copies/mL - - -   CMV QT Log <2.0 Log copies/mL - - -   BK Spec - Plasma Plasma Plasma   BK Res BKNEG:BK Virus DNA Not Detected copies/mL BK Virus DNA Not Detected BK Virus DNA Not Detected BK Virus DNA Not Detected   BK Log <2.7 Log copies/mL Not Calculated Not Calculated Not Calculated   EBV IgG - - - -   Hep B Core NEG - - -   Hep B Surf - - - -   HIV 1&2 NEG - - -        Recent Labs   Lab Test 12/26/18  1141 03/20/19  0742 06/25/19  0746   DOSTAC 12/25/2018 AT 1000 AM 03/19/2019 AT 7AM 06/24/2019 at 0710 am   TACROL 5.5 4.9* 5.5     Recent Labs   Lab Test 05/08/12  0824 06/19/12  0815 07/16/12  0807   DOSMPA edta EDTA EDTA   MPACID 4.27* 6.64* <0.25*   MPAG 31.6 39.4 <6.5*     Scribe Disclosure:  I, Allie Hayes, am serving as a scribe to document services personally performed by Dr. Rubin at this visit, based upon the provider's statements to me. All documentation has been reviewed by the aforementioned provider prior to being entered into the official medical record.       Kidney/Pancreas Recipient

## 2019-07-02 LAB — DEPRECATED CALCIDIOL+CALCIFEROL SERPL-MC: 21 UG/L (ref 20–75)

## 2019-08-24 DIAGNOSIS — D84.9 IMMUNOSUPPRESSED STATUS (H): Primary | ICD-10-CM

## 2019-08-26 RX ORDER — SULFAMETHOXAZOLE AND TRIMETHOPRIM 400; 80 MG/1; MG/1
1 TABLET ORAL DAILY
Qty: 90 TABLET | Refills: 3 | Status: SHIPPED | OUTPATIENT
Start: 2019-08-26 | End: 2020-06-15

## 2019-11-09 ENCOUNTER — HEALTH MAINTENANCE LETTER (OUTPATIENT)
Age: 44
End: 2019-11-09

## 2019-12-13 ENCOUNTER — OFFICE VISIT (OUTPATIENT)
Dept: FAMILY MEDICINE | Facility: CLINIC | Age: 44
End: 2019-12-13
Payer: COMMERCIAL

## 2019-12-13 VITALS
HEIGHT: 59 IN | OXYGEN SATURATION: 100 % | DIASTOLIC BLOOD PRESSURE: 78 MMHG | TEMPERATURE: 98.4 F | BODY MASS INDEX: 21.77 KG/M2 | SYSTOLIC BLOOD PRESSURE: 124 MMHG | WEIGHT: 108 LBS | HEART RATE: 69 BPM

## 2019-12-13 DIAGNOSIS — D84.9 IMMUNOSUPPRESSED STATUS (H): ICD-10-CM

## 2019-12-13 DIAGNOSIS — Z13.6 CARDIOVASCULAR SCREENING; LDL GOAL LESS THAN 100: ICD-10-CM

## 2019-12-13 DIAGNOSIS — Z94.0 KIDNEY TRANSPLANTED: ICD-10-CM

## 2019-12-13 DIAGNOSIS — Z94.0 KIDNEY REPLACED BY TRANSPLANT: ICD-10-CM

## 2019-12-13 DIAGNOSIS — Z23 NEED FOR PROPHYLACTIC VACCINATION AND INOCULATION AGAINST INFLUENZA: ICD-10-CM

## 2019-12-13 DIAGNOSIS — Z00.00 ROUTINE GENERAL MEDICAL EXAMINATION AT A HEALTH CARE FACILITY: Primary | ICD-10-CM

## 2019-12-13 LAB
ANION GAP SERPL CALCULATED.3IONS-SCNC: 7 MMOL/L (ref 3–14)
BUN SERPL-MCNC: 23 MG/DL (ref 7–30)
CALCIUM SERPL-MCNC: 9 MG/DL (ref 8.5–10.1)
CHLORIDE SERPL-SCNC: 108 MMOL/L (ref 94–109)
CHOLEST SERPL-MCNC: 179 MG/DL
CO2 SERPL-SCNC: 23 MMOL/L (ref 20–32)
CREAT SERPL-MCNC: 1.14 MG/DL (ref 0.52–1.04)
ERYTHROCYTE [DISTWIDTH] IN BLOOD BY AUTOMATED COUNT: 11.6 % (ref 10–15)
GFR SERPL CREATININE-BSD FRML MDRD: 57 ML/MIN/{1.73_M2}
GLUCOSE SERPL-MCNC: 86 MG/DL (ref 70–99)
HCT VFR BLD AUTO: 40.9 % (ref 35–47)
HDLC SERPL-MCNC: 54 MG/DL
HGB BLD-MCNC: 14.3 G/DL (ref 11.7–15.7)
LDLC SERPL CALC-MCNC: 106 MG/DL
MCH RBC QN AUTO: 33.6 PG (ref 26.5–33)
MCHC RBC AUTO-ENTMCNC: 35 G/DL (ref 31.5–36.5)
MCV RBC AUTO: 96 FL (ref 78–100)
NONHDLC SERPL-MCNC: 125 MG/DL
PLATELET # BLD AUTO: 282 10E9/L (ref 150–450)
POTASSIUM SERPL-SCNC: 3.9 MMOL/L (ref 3.4–5.3)
RBC # BLD AUTO: 4.25 10E12/L (ref 3.8–5.2)
SODIUM SERPL-SCNC: 138 MMOL/L (ref 133–144)
TACROLIMUS BLD-MCNC: 5 UG/L (ref 5–15)
TME LAST DOSE: NORMAL H
TRIGL SERPL-MCNC: 95 MG/DL
WBC # BLD AUTO: 3.2 10E9/L (ref 4–11)

## 2019-12-13 PROCEDURE — 90686 IIV4 VACC NO PRSV 0.5 ML IM: CPT | Performed by: PHYSICIAN ASSISTANT

## 2019-12-13 PROCEDURE — 80061 LIPID PANEL: CPT | Performed by: PHYSICIAN ASSISTANT

## 2019-12-13 PROCEDURE — 85027 COMPLETE CBC AUTOMATED: CPT | Performed by: INTERNAL MEDICINE

## 2019-12-13 PROCEDURE — 36415 COLL VENOUS BLD VENIPUNCTURE: CPT | Performed by: PHYSICIAN ASSISTANT

## 2019-12-13 PROCEDURE — 80197 ASSAY OF TACROLIMUS: CPT | Performed by: INTERNAL MEDICINE

## 2019-12-13 PROCEDURE — 90471 IMMUNIZATION ADMIN: CPT | Performed by: PHYSICIAN ASSISTANT

## 2019-12-13 PROCEDURE — 80048 BASIC METABOLIC PNL TOTAL CA: CPT | Performed by: INTERNAL MEDICINE

## 2019-12-13 PROCEDURE — 99396 PREV VISIT EST AGE 40-64: CPT | Mod: 25 | Performed by: PHYSICIAN ASSISTANT

## 2019-12-13 ASSESSMENT — MIFFLIN-ST. JEOR: SCORE: 1036.31

## 2019-12-13 NOTE — PROGRESS NOTES
SUBJECTIVE:   CC: Nena Underwood is an 44 year old woman who presents for preventive health visit.     Healthy Habits:    Do you get at least three servings of calcium containing foods daily (dairy, green leafy vegetables, etc.)? yes    Amount of exercise or daily activities, outside of work: none    Problems taking medications regularly No    Medication side effects: No    Have you had an eye exam in the past two years? no    Do you see a dentist twice per year? yes    Do you have sleep apnea, excessive snoring or daytime drowsiness?no      Nena is a 45 y/o female with PMH agenesis of the kidneys, s/p kidney transplant.  She is on chronic Tacrolimus, Prednisone and Imuran.  She follows with Nephrology ( Dr. Chong) every year, next will be 2020. No current concerns.       Today's PHQ-2 Score:   PHQ-2 (  [employer]) 2019   Q1: Little interest or pleasure in doing things 0 0   Q2: Feeling down, depressed or hopeless 0 0   PHQ-2 Score 0 0       Abuse: Current or Past(Physical, Sexual or Emotional)- No  Do you feel safe in your environment? Yes        Social History     Tobacco Use     Smoking status: Never Smoker     Smokeless tobacco: Never Used   Substance Use Topics     Alcohol use: No     Alcohol/week: 0.0 standard drinks     If you drink alcohol do you typically have >3 drinks per day or >7 drinks per week? No                     Reviewed orders with patient.  Reviewed health maintenance and updated orders accordingly - Yes  Patient Active Problem List   Diagnosis     Kidney replaced by transplant     Aftercare following organ transplant     CARDIOVASCULAR SCREENING; LDL GOAL LESS THAN 100     Immunosuppressed status (H)     Moraxella catarrhalis pneumonia (H)     Pyelonephritis     Cervical cancer screening     History of recurrent urinary tract infection     EBV (Jaime-Barr virus) viremia     Vitamin D deficiency     Past Surgical History:   Procedure Laterality Date       SECTION  2014    Procedure:  SECTION;;  Surgeon: Griselda Becerra MD;  Location: [facility]     HEMODIALYSIS VIA AV FISTULA      left arm     INSERT INTRAUTERINE DEVICE      Paraguard. removed      REMOVE INTRAUTERINE DEVICE  2017     TRANSPLANT KIDNEY RECIPIENT LIVING UNRELATED  6/23/10       Social History     Tobacco Use     Smoking status: Never Smoker     Smokeless tobacco: Never Used   Substance Use Topics     Alcohol use: No     Alcohol/week: 0.0 standard drinks     Family History   Problem Relation Age of Onset     Respiratory Paternal Grandmother         MARIKA had pulmonary TB at age 85, then  of old age at 89; she lived with Nena     Eye Disorder Mother         retinal problems         Current Outpatient Medications   Medication Sig Dispense Refill     Acetaminophen (TYLENOL PO) Take 500 mg by mouth every 4 hours as needed for mild pain or fever       azaTHIOprine (IMURAN) 50 MG tablet Take 1 tablet (50 mg) by mouth daily Labs required for refills 90 tablet 11     predniSONE (DELTASONE) 5 MG tablet Take 5 mg by mouth daily. 90 tablet 3     sulfamethoxazole-trimethoprim (BACTRIM/SEPTRA) 400-80 MG tablet Take 1 tablet by mouth daily 90 tablet 3     sulfamethoxazole-trimethoprim (BACTRIM/SEPTRA) 400-80 MG tablet Take 1 tablet by mouth daily       tacrolimus (ENVARSUS XR) 1 MG 24 hr tablet Take 1 tablet (1 mg) by mouth every morning (before breakfast) 30 tablet 11     etonogestrel (IMPLANON/NEXPLANON) 68 MG IMPL 1 each (68 mg) by Subdermal route once for 1 dose 1 each 0     Allergies   Allergen Reactions     Nkda [No Known Drug Allergies]      Recent Labs   Lab Test 19  1630 19  0743  18  0844  18  0045  17  1816  17  2136  17  0850   LDL  --   --   --  89  --   --   --   --   --   --   --  50   HDL  --   --   --  63  --   --   --   --   --   --   --  53   TRIG  --   --   --  68  --   --   --   --   --   --   --  74   ALT  --   --    --   --   --  20  --  22  --  36  --   --    CR 0.84 1.02   < > 0.81   < > 0.88   < > 1.32*   < > 1.22*   < >  --    GFRESTIMATED 83 66   < > 76   < > 70   < > 44*   < > 48*   < >  --    GFRESTBLACK >90 76   < > >90   < > 84   < > 53*   < > 58*   < >  --    POTASSIUM 4.1 3.7   < > 4.0   < > 3.4   < > 4.0   < > 3.8   < >  --     < > = values in this interval not displayed.        Mammogram Screening: Patient under age 50, mutual decision reflected in health maintenance.      Pertinent mammograms are reviewed under the imaging tab.  History of abnormal Pap smear: NO - age 30-65 PAP every 5 years with negative HPV co-testing recommended  PAP / HPV Latest Ref Rng & Units 2018   PAP - NIL NIL NIL   HPV 16 DNA NEG:Negative Negative Negative -   HPV 18 DNA NEG:Negative Negative Negative -   OTHER HR HPV NEG:Negative Negative Negative -     Reviewed and updated as needed this visit by clinical staff  Tobacco  Allergies  Meds         Reviewed and updated as needed this visit by Provider        Past Medical History:   Diagnosis Date     Anemia      High risk medication use      Hyperparathyroidism, secondary renal (H)      Immunosuppressed status (H)      Kidney replaced by transplant 6/23/10    KIDNEY TRANSPLANT STATUS     Moraxella catarrhalis pneumonia (HCC) 3/28/2016     Pneumonia 2013    requiring hospitalization     Renal aplasia     since birth     Single seizure (H)     felt secondary to uremia     TB (pulmonary tuberculosis)     treated with 4 drug regimen (INH, rifampin, ethambutol and pyrazinamide) for 6 months      Past Surgical History:   Procedure Laterality Date      SECTION  2014    Procedure:  SECTION;;  Surgeon: Griselda Becerra MD;  [facility]: [facility]     HEMODIALYSIS VIA AV FISTULA      left arm     INSERT INTRAUTERINE DEVICE      Paraguard. removed      REMOVE INTRAUTERINE DEVICE  2017     TRANSPLANT KIDNEY RECIPIENT LIVING  "UNRELATED  6/23/10     OB History    Para Term  AB Living   1 1 0 1 0 1   SAB TAB Ectopic Multiple Live Births   0 0 0 0 1      # Outcome Date GA Lbr Karri/2nd Weight Sex Delivery Anes PTL Lv   1  14 34w0d  2.58 kg (5 lb 11 oz) M CS-LTranv  Y JEREMÍAS      Name: MARGOTH SANDERS A      Apgar1: 3  Apgar5: 5       ROS:  CONSTITUTIONAL: NEGATIVE for fever, chills, change in weight  INTEGUMENTARU/SKIN: NEGATIVE for worrisome rashes, moles or lesions  EYES: NEGATIVE for vision changes or irritation  ENT: NEGATIVE for ear, mouth and throat problems  RESP: NEGATIVE for significant cough or SOB  BREAST: NEGATIVE for masses, tenderness or discharge  CV: NEGATIVE for chest pain, palpitations or peripheral edema  GI: NEGATIVE for nausea, abdominal pain, heartburn, or change in bowel habits  : NEGATIVE for unusual urinary or vaginal symptoms. Periods are regular.  MUSCULOSKELETAL: NEGATIVE for significant arthralgias or myalgia  NEURO: NEGATIVE for weakness, dizziness or paresthesias  PSYCHIATRIC: NEGATIVE for changes in mood or affect    OBJECTIVE:   /78   Pulse 69   Temp 98.4  F (36.9  C) (Tympanic)   Ht 1.5 m (4' 11.05\")   Wt 49 kg (108 lb)   LMP 2019   SpO2 100%   BMI 21.78 kg/m    EXAM:  GENERAL: healthy, alert and no distress  EYES: Eyes grossly normal to inspection, PERRL and conjunctivae and sclerae normal  HENT: ear canals and TM's normal, nose and mouth without ulcers or lesions  NECK: no adenopathy, no asymmetry, masses, or scars and thyroid normal to palpation  RESP: lungs clear to auscultation - no rales, rhonchi or wheezes  BREAST: normal without masses, tenderness or nipple discharge and no palpable axillary masses or adenopathy  CV: regular rate and rhythm, normal S1 S2, no S3 or S4, no murmur, click or rub, no peripheral edema and peripheral pulses strong  ABDOMEN: soft, nontender, no hepatosplenomegaly, no masses and bowel sounds normal  MS: no gross musculoskeletal " "defects noted, no edema  SKIN: no suspicious lesions or rashes  NEURO: Normal strength and tone, mentation intact and speech normal  PSYCH: mentation appears normal, affect normal/bright    Diagnostic Test Results:  Labs reviewed in Epic    ASSESSMENT/PLAN:   1. Routine general medical examination at a health care facility    2. Immunosuppressed status (H)  Continue same regimen  Advised that she should have DEXA scan, she would like to hold off for now  PCV vaccinations and flu shot recommended, she will wait on PCV today, agreeable to flu shot.     3. Kidney replaced by transplant  Due for her standing labs today    4. CARDIOVASCULAR SCREENING; LDL GOAL LESS THAN 100  - Lipid Profile (Chol, Trig, HDL, LDL calc)    5. Need for prophylactic vaccination and inoculation against influenza  - INFLUENZA VACCINE IM > 6 MONTHS VALENT IIV4 [08801]  - Vaccine Administration, Initial [85279]    COUNSELING:   Reviewed preventive health counseling, as reflected in patient instructions       Regular exercise       Healthy diet/nutrition       Immunizations    Vaccinated for: Influenza             Contraception       Osteoporosis Prevention/Bone Health    Estimated body mass index is 21.78 kg/m  as calculated from the following:    Height as of this encounter: 1.5 m (4' 11.05\").    Weight as of this encounter: 49 kg (108 lb).         reports that she has never smoked. She has never used smokeless tobacco.      Counseling Resources:  ATP IV Guidelines  Pooled Cohorts Equation Calculator  Breast Cancer Risk Calculator  FRAX Risk Assessment  ICSI Preventive Guidelines  Dietary Guidelines for Americans, 2010  USDA's MyPlate  ASA Prophylaxis  Lung CA Screening    Brad Werner PA-C  [facility]  "

## 2019-12-13 NOTE — LETTER
December 16, 2019      Nena Underwood  [id number] [address]        Dear ,    We are writing to inform you of your test results.      -LDL(bad) cholesterol level is elevated which can increase your heart disease risk.  A diet high in fat and simple carbohydrates, genetics and being overweight can contribute to this. ADVISE: exercising 150 minutes of aerobic exercise per week (30 minutes for 5 days per week or 50 minutes for 3 days per week are options) and eating a low saturated fat/low carbohydrate diet are helpful to improve this.    Resulted Orders   Lipid Profile (Chol, Trig, HDL, LDL calc)   Result Value Ref Range    Cholesterol 179 <200 mg/dL    Triglycerides 95 <150 mg/dL      Comment:      Fasting specimen    HDL Cholesterol 54 >49 mg/dL    LDL Cholesterol Calculated 106 (H) <100 mg/dL      Comment:      Above desirable:  100-129 mg/dl  Borderline High:  130-159 mg/dL  High:             160-189 mg/dL  Very high:       >189 mg/dl      Non HDL Cholesterol 125 <130 mg/dL       If you have any questions or concerns, please call the clinic at the number listed above.       Sincerely,          Brad Werner PA-C

## 2019-12-17 ENCOUNTER — TELEPHONE (OUTPATIENT)
Dept: TRANSPLANT | Facility: CLINIC | Age: 44
End: 2019-12-17

## 2020-02-05 ENCOUNTER — TELEPHONE (OUTPATIENT)
Dept: FAMILY MEDICINE | Facility: CLINIC | Age: 45
End: 2020-02-05

## 2020-02-05 NOTE — TELEPHONE ENCOUNTER
Reason for Call:  Other c    Detailed comments:  PT would like to make an appt for a Nexplanon with Dr. Tucker on  March 6th  Around 8 am   Would like to talk with you about other issues     Phone Number Patient can be reached at: Cell number on file:    Telephone Information:   Mobile [phone]       Best Time:any     Can we leave a detailed message on this number? Yes     Call taken on 2/5/2020 at 7:48 AM by Griselda Palomino

## 2020-03-06 ENCOUNTER — OFFICE VISIT (OUTPATIENT)
Dept: FAMILY MEDICINE | Facility: CLINIC | Age: 45
End: 2020-03-06
Payer: COMMERCIAL

## 2020-03-06 VITALS
TEMPERATURE: 98.7 F | WEIGHT: 111 LBS | BODY MASS INDEX: 22.38 KG/M2 | HEIGHT: 59 IN | HEART RATE: 64 BPM | DIASTOLIC BLOOD PRESSURE: 62 MMHG | OXYGEN SATURATION: 98 % | SYSTOLIC BLOOD PRESSURE: 110 MMHG

## 2020-03-06 DIAGNOSIS — Z30.46 ENCOUNTER FOR NEXPLANON REMOVAL: Primary | ICD-10-CM

## 2020-03-06 DIAGNOSIS — Z30.017 NEXPLANON INSERTION: ICD-10-CM

## 2020-03-06 DIAGNOSIS — Z94.0 KIDNEY TRANSPLANTED: ICD-10-CM

## 2020-03-06 LAB
ANION GAP SERPL CALCULATED.3IONS-SCNC: 4 MMOL/L (ref 3–14)
BUN SERPL-MCNC: 15 MG/DL (ref 7–30)
CALCIUM SERPL-MCNC: 9.1 MG/DL (ref 8.5–10.1)
CHLORIDE SERPL-SCNC: 108 MMOL/L (ref 94–109)
CO2 SERPL-SCNC: 26 MMOL/L (ref 20–32)
CREAT SERPL-MCNC: 0.91 MG/DL (ref 0.52–1.04)
ERYTHROCYTE [DISTWIDTH] IN BLOOD BY AUTOMATED COUNT: 11.6 % (ref 10–15)
GFR SERPL CREATININE-BSD FRML MDRD: 75 ML/MIN/{1.73_M2}
GLUCOSE SERPL-MCNC: 99 MG/DL (ref 70–99)
HCT VFR BLD AUTO: 40.8 % (ref 35–47)
HGB BLD-MCNC: 14.4 G/DL (ref 11.7–15.7)
MCH RBC QN AUTO: 34.3 PG (ref 26.5–33)
MCHC RBC AUTO-ENTMCNC: 35.3 G/DL (ref 31.5–36.5)
MCV RBC AUTO: 97 FL (ref 78–100)
PLATELET # BLD AUTO: 271 10E9/L (ref 150–450)
POTASSIUM SERPL-SCNC: 4.4 MMOL/L (ref 3.4–5.3)
RBC # BLD AUTO: 4.2 10E12/L (ref 3.8–5.2)
SODIUM SERPL-SCNC: 138 MMOL/L (ref 133–144)
TACROLIMUS BLD-MCNC: 3 UG/L (ref 5–15)
TME LAST DOSE: ABNORMAL H
WBC # BLD AUTO: 5.4 10E9/L (ref 4–11)

## 2020-03-06 PROCEDURE — 85027 COMPLETE CBC AUTOMATED: CPT | Performed by: INTERNAL MEDICINE

## 2020-03-06 PROCEDURE — 80048 BASIC METABOLIC PNL TOTAL CA: CPT | Performed by: INTERNAL MEDICINE

## 2020-03-06 PROCEDURE — 36415 COLL VENOUS BLD VENIPUNCTURE: CPT | Performed by: INTERNAL MEDICINE

## 2020-03-06 PROCEDURE — 80197 ASSAY OF TACROLIMUS: CPT | Performed by: INTERNAL MEDICINE

## 2020-03-06 PROCEDURE — 11983 REMOVE/INSERT DRUG IMPLANT: CPT | Performed by: PHYSICIAN ASSISTANT

## 2020-03-06 ASSESSMENT — MIFFLIN-ST. JEOR: SCORE: 1044.92

## 2020-03-06 NOTE — PROGRESS NOTES
Subjective     Nena Underwood is a 45 year old female who presents to clinic today for the following health issues:    HPI   Remove and replace nexplanon    Nexplanon placed in right upper arm approx three year ago ( May 2017).  Nena is happy with this form of birth control and would like a new device inserted today.  She is not concerned for pregnancy.          Patient Active Problem List   Diagnosis     Kidney replaced by transplant     Aftercare following organ transplant     CARDIOVASCULAR SCREENING; LDL GOAL LESS THAN 100     Immunosuppressed status (H)     Moraxella catarrhalis pneumonia (H)     Pyelonephritis     Cervical cancer screening     History of recurrent urinary tract infection     EBV (Jaime-Barr virus) viremia     Vitamin D deficiency     Past Surgical History:   Procedure Laterality Date      SECTION  2014    Procedure:  SECTION;;  Surgeon: Griselda Becerra MD;  Location: [facility] L+D     HEMODIALYSIS VIA AV FISTULA      left arm     INSERT INTRAUTERINE DEVICE      Paraguard. removed      REMOVE INTRAUTERINE DEVICE  2017     TRANSPLANT KIDNEY RECIPIENT LIVING UNRELATED  6/23/10       Social History     Tobacco Use     Smoking status: Never Smoker     Smokeless tobacco: Never Used   Substance Use Topics     Alcohol use: No     Alcohol/week: 0.0 standard drinks     Family History   Problem Relation Age of Onset     Respiratory Paternal Grandmother         PGM had pulmonary TB at age 85, then  of old age at 89; she lived with Nena     Eye Disorder Mother         retinal problems         Current Outpatient Medications   Medication Sig Dispense Refill     Acetaminophen (TYLENOL PO) Take 500 mg by mouth every 4 hours as needed for mild pain or fever       azaTHIOprine (IMURAN) 50 MG tablet Take 1 tablet (50 mg) by mouth daily Labs required for refills 90 tablet 11     predniSONE (DELTASONE) 5 MG tablet Take 5 mg by mouth daily. 90 tablet 3      "sulfamethoxazole-trimethoprim (BACTRIM/SEPTRA) 400-80 MG tablet Take 1 tablet by mouth daily 90 tablet 3     sulfamethoxazole-trimethoprim (BACTRIM/SEPTRA) 400-80 MG tablet Take 1 tablet by mouth daily       tacrolimus (ENVARSUS XR) 1 MG 24 hr tablet Take 1 tablet (1 mg) by mouth every morning (before breakfast) 30 tablet 11     etonogestrel (IMPLANON/NEXPLANON) 68 MG IMPL 1 each (68 mg) by Subdermal route once for 1 dose 1 each 0     Allergies   Allergen Reactions     Nkda [No Known Drug Allergies]          Reviewed and updated as needed this visit by Provider         Review of Systems   ROS COMP: Constitutional, HEENT, cardiovascular, pulmonary, gi and gu systems are negative, except as otherwise noted.      Objective    /62   Pulse 64   Temp 98.7  F (37.1  C) (Tympanic)   Ht 1.5 m (4' 11.05\")   Wt 50.3 kg (111 lb)   LMP 02/17/2020   SpO2 98%   BMI 22.38 kg/m    Body mass index is 22.38 kg/m .  Physical Exam   GENERAL: healthy, alert and no distress    Diagnostic Test Results:  Labs reviewed in Epic        Procedure:  Consent obtained. 3cc 2% lidocaine with epi injected into right upper arm at site of existing Nexplanon device.  This device was easily palpated. A small  Incision was made at nexplanon insertion site using 11 blade scalpel.  Curved hemostats were used to carefully locate nexplanon device.  Device was located and removed from the arm and a new device was inserted without complications.  Wound was covered with steri strips and wrapped with coban.  Home care instructions given.     Assessment & Plan     1. Encounter for Nexplanon removal      2. Nexplanon insertion  - etonogestrel (NEXPLANON) subdermal implant 68 mg     Follow up as needed    No follow-ups on file.    Brad Werner PA-C  [facility]      "

## 2020-03-14 DIAGNOSIS — Z94.0 KIDNEY REPLACED BY TRANSPLANT: ICD-10-CM

## 2020-03-14 DIAGNOSIS — Z94.0 KIDNEY TRANSPLANTED: Primary | ICD-10-CM

## 2020-03-16 RX ORDER — TACROLIMUS 1 MG/1
1 TABLET, EXTENDED RELEASE ORAL
Qty: 30 TABLET | Refills: 11 | Status: SHIPPED | OUTPATIENT
Start: 2020-03-16 | End: 2021-03-09

## 2020-03-16 RX ORDER — PREDNISONE 5 MG/1
5 TABLET ORAL DAILY
Qty: 90 TABLET | Refills: 3 | Status: SHIPPED | OUTPATIENT
Start: 2020-03-16 | End: 2021-06-01

## 2020-06-15 DIAGNOSIS — D84.9 IMMUNOSUPPRESSED STATUS (H): Primary | ICD-10-CM

## 2020-06-15 RX ORDER — SULFAMETHOXAZOLE AND TRIMETHOPRIM 400; 80 MG/1; MG/1
1 TABLET ORAL DAILY
Qty: 90 TABLET | Refills: 3 | Status: SHIPPED | OUTPATIENT
Start: 2020-06-15 | End: 2021-12-31

## 2020-06-22 ENCOUNTER — TELEPHONE (OUTPATIENT)
Dept: TRANSPLANT | Facility: CLINIC | Age: 45
End: 2020-06-22

## 2020-06-22 NOTE — TELEPHONE ENCOUNTER
Patient Call: Voicemail  Date/Time: 6/22/20 @ 2:21 PM  Reason for call: Patient need new lab orders

## 2020-06-22 NOTE — LETTER
PHYSICIAN ORDERS    DATE & TIME ISSUED: 2020  4:27 PM  PATIENT NAME: Nena Underwood   : 1975     [facility] MR# [if applicable]: [id number]     DIAGNOSIS / ICD - 10 CODES    Kidney Transplanted (Z94.0)    After Care Following Organ Transplant (Z48.298)    Long Term Use of Medication (Z79.899)    Complications Kidney Transplant (T86.10)      Every 3 months    Hemogram and Platelet    Basic Metabolic Panel (Sodium,potassium,chloride,CO2,creatinine,urea,nitrogen,glucose,calcium)     / tacrolimus / Prograf drug level    Every 6 months    Urine for protein creatinine ratio      Patient should release information to the [facility].   Please fax results to the Transplant Center at [phone].  Any questions please call [phone] or [phone].        Joss Rubin MD

## 2020-09-16 DIAGNOSIS — Z94.0 KIDNEY REPLACED BY TRANSPLANT: Primary | ICD-10-CM

## 2020-09-17 RX ORDER — AZATHIOPRINE 50 MG/1
50 TABLET ORAL DAILY
Qty: 90 TABLET | Refills: 3 | Status: SHIPPED | OUTPATIENT
Start: 2020-09-17 | End: 2021-09-23

## 2020-10-01 ENCOUNTER — VIRTUAL VISIT (OUTPATIENT)
Dept: NEPHROLOGY | Facility: CLINIC | Age: 45
End: 2020-10-01
Attending: INTERNAL MEDICINE
Payer: COMMERCIAL

## 2020-10-01 DIAGNOSIS — Z48.298 AFTERCARE FOLLOWING ORGAN TRANSPLANT: Primary | ICD-10-CM

## 2020-10-01 DIAGNOSIS — E21.3 HYPERPARATHYROIDISM (H): ICD-10-CM

## 2020-10-01 DIAGNOSIS — R07.89 ATYPICAL CHEST PAIN: ICD-10-CM

## 2020-10-01 DIAGNOSIS — E55.9 VITAMIN D DEFICIENCY: ICD-10-CM

## 2020-10-01 PROCEDURE — 99214 OFFICE O/P EST MOD 30 MIN: CPT | Mod: 95 | Performed by: INTERNAL MEDICINE

## 2020-10-01 RX ORDER — SULFAMETHOXAZOLE AND TRIMETHOPRIM 400; 80 MG/1; MG/1
1 TABLET ORAL DAILY
Qty: 90 TABLET | Refills: 3 | Status: SHIPPED | OUTPATIENT
Start: 2020-10-01 | End: 2020-11-06

## 2020-10-01 NOTE — LETTER
"10/1/2020       RE: Nena Underwood  [id number] [place] [address]     Dear Colleague,    Thank you for referring your patient, Nena Underwood, to the [facility] [place] at [facility]. Please see a copy of my visit note below.    Nena Underwood is a 45 year old female who is being evaluated via a billable video visit.      The patient has been notified of following:     \"This video visit will be conducted via a call between you and your physician/provider. We have found that certain health care needs can be provided without the need for an in-person physical exam.  This service lets us provide the care you need with a video conversation.  If a prescription is necessary we can send it directly to your pharmacy.  If lab work is needed we can place an order for that and you can then stop by our lab to have the test done at a later time.    Video visits are billed at different rates depending on your insurance coverage.  Please reach out to your insurance provider with any questions.    If during the course of the call the physician/provider feels a video visit is not appropriate, you will not be charged for this service.\"    Patient has given verbal consent for Video visit? Yes  How would you like to obtain your AVS? Mail a copy  If you are dropped from the video visit, the video invite should be resent to: Send to e-mail at: [email or web address]  Will anyone else be joining your video visit? No        Video-Visit Details    Type of service:  Video Visit    Originating Location (pt. Location): Home    Distant Location (provider location):  [facility] [place]     Platform used for Video Visit: Viry Rubin MD          TRANSPLANT NEPHROLOGY TELEMEDICINE VISIT    Nena Underwood is a 45 year old female who is being evaluated via a billable telemedicine (video/telephone) visit on October 1, 2020. " "    The patient has been notified of following:     \"This telemedicine (video/telephone) visit will be conducted via a video/phone call between you and your physician. We have found that certain health care needs can be provided without the need for a physical exam.  This service lets us provide the care you need with a short video/phone conversation.  If a prescription is necessary, we can send it directly to your pharmacy.  If lab work is needed, we can place an order for that and you can then stop by our lab to have the test done at a later time.    If during the course of this video/phone call the physician feels a telemedicine (video/telephone) visit is not appropriate, you will not be charged for this service and an in clinic visit will be arranged at a later date.\"     Assessment & Plan   # LDKT: Stable   - Baseline Cr ~ 1.02 mg/dL   - Proteinuria: Normal (<0.2 grams)   - Date DSA Last Checked: Not Known       Latest DSA: No   - BK Viremia: Not checked recently   - Kidney Tx Biopsy: No    # Immunosuppression: Tacrolimus immediate release (goal 3-5), Azathioprine (dose Take 1 tablet (50 mg) by mouth daily ) and Prednisone (dose 5 mg daily)   - Changes: No    # Prophylaxis:   - PJP: None    # Hypertension: Controlled; Goal BP: < 130/80   - Changes: No    # Anemia in Chronic Renal Disease: Hgb: Stable      JUDITH: No   - Iron studies: Not checked recently    # Mineral Bone Disorder:   - consider dexa scan. Will update PTH and vitamin D      # Electrolytes:   - Potassium; level: Normal   - Magnesium; level: Not checked recently   - Bicarbonate; level: Normal   - Sodium; level: Normal    # Skin Cancer Risk:    - Discussed sun protection and recommend regular follow up with Dermatology.    # Musculoskeletal chest pain: resolved. Was wondering if she needs to update her cardiac testing which seemed reasonable. Will update an echo and EKG     # Age appropriate cancer screening and vaccination update: we discussed " shingrix and flu vaccine. Mammogram and pap smears with her pcp.   # Medical Compliance: Yes    # COVID-19 Virus Review: Discussed COVID-19 virus and the potential medical risks.  Reviewed preventative health recommendations, which includes washing hands for 20 seconds, avoid touching your face, and social distancing.  Asked patient to inform the transplant center if they are exposed or diagnosed with this virus.    # Transplant History:  Etiology of kidney failure: Unknown etiology  Tx: LDKT  Transplant: 6/23/2010 (Kidney)  Donor Type: Living Donor Class:   Significant changes in immunosuppression: None  Significant transplant-related complications: None    Transplant Office Phone Number: [phone]    Assessment and plan was discussed with the patient and she voiced her understanding and agreement.    Return Visit: one year       Nena Underwood has a clinical telephone visit for routine follow up and immunosuppression management.    History of Present Illness   Some musculoskeletal complaints due pulled chest muscle. Doing well otherwise. She gave us release of info to be able to review her labs in care everywhere. She is taking her medications regularly.     Recent Hospitalizations:  [x] No [] Yes    New Medical Issues: [x] No [] Yes    Decreased energy: [x] No [] Yes    Chest pain or SOB with exertion:  [x] No [] Yes    Appetite change or weight change: [x] No [] Yes    Nausea, vomiting or diarrhea:  [x] No [] Yes    Fever, sweats or chills: [x] No [] Yes    Leg swelling: [x] No [] Yes      Home BP: 120/80    Review of Systems   A comprehensive review of systems was obtained and negative, except as noted in the HPI or PMH.    I have reviewed and updated the patient's Past Medical History, Social History, and Medication List.    Active Medical Problems  Patient Active Problem List   Diagnosis     Kidney replaced by transplant     Aftercare following organ transplant     CARDIOVASCULAR SCREENING; LDL GOAL LESS  THAN 100     Immunosuppressed status (H)     Moraxella catarrhalis pneumonia (H)     Pyelonephritis     Cervical cancer screening     History of recurrent urinary tract infection     EBV (Jaime-Barr virus) viremia     Vitamin D deficiency     Allergies  Nkda [no known drug allergies]    Medications  Current Outpatient Medications   Medication Sig     Acetaminophen (TYLENOL PO) Take 500 mg by mouth every 4 hours as needed for mild pain or fever     azaTHIOprine (IMURAN) 50 MG tablet Take 1 tablet (50 mg) by mouth daily Labs required for refills     etonogestrel (IMPLANON/NEXPLANON) 68 MG IMPL 1 each (68 mg) by Subdermal route once for 1 dose     predniSONE (DELTASONE) 5 MG tablet Take 1 tablet (5 mg) by mouth daily     sulfamethoxazole-trimethoprim (BACTRIM) 400-80 MG tablet Take 1 tablet by mouth daily     tacrolimus (ENVARSUS XR) 1 MG 24 hr tablet Take 1 tablet (1 mg) by mouth every morning (before breakfast)     sulfamethoxazole-trimethoprim (BACTRIM/SEPTRA) 400-80 MG tablet Take 1 tablet by mouth daily     Current Facility-Administered Medications   Medication     etonogestrel (NEXPLANON) subdermal implant 68 mg       Video call contact time:  25 min     Joss Rubin MD

## 2020-10-01 NOTE — PROGRESS NOTES
"Nena Underwood is a 45 year old female who is being evaluated via a billable video visit.      The patient has been notified of following:     \"This video visit will be conducted via a call between you and your physician/provider. We have found that certain health care needs can be provided without the need for an in-person physical exam.  This service lets us provide the care you need with a video conversation.  If a prescription is necessary we can send it directly to your pharmacy.  If lab work is needed we can place an order for that and you can then stop by our lab to have the test done at a later time.    Video visits are billed at different rates depending on your insurance coverage.  Please reach out to your insurance provider with any questions.    If during the course of the call the physician/provider feels a video visit is not appropriate, you will not be charged for this service.\"    Patient has given verbal consent for Video visit? Yes  How would you like to obtain your AVS? Mail a copy  If you are dropped from the video visit, the video invite should be resent to: Send to e-mail at: [email or web address]  Will anyone else be joining your video visit? No        Video-Visit Details    Type of service:  Video Visit    Originating Location (pt. Location): Home    Distant Location (provider location):  [facility] [place]     Platform used for Video Visit: Viry Rubin MD        "

## 2020-10-01 NOTE — PROGRESS NOTES
"TRANSPLANT NEPHROLOGY TELEMEDICINE VISIT    Nena Underwood is a 45 year old female who is being evaluated via a billable telemedicine (video/telephone) visit on October 1, 2020.     The patient has been notified of following:     \"This telemedicine (video/telephone) visit will be conducted via a video/phone call between you and your physician. We have found that certain health care needs can be provided without the need for a physical exam.  This service lets us provide the care you need with a short video/phone conversation.  If a prescription is necessary, we can send it directly to your pharmacy.  If lab work is needed, we can place an order for that and you can then stop by our lab to have the test done at a later time.    If during the course of this video/phone call the physician feels a telemedicine (video/telephone) visit is not appropriate, you will not be charged for this service and an in clinic visit will be arranged at a later date.\"     Assessment & Plan   # LDKT: Stable   - Baseline Cr ~ 1.02 mg/dL   - Proteinuria: Normal (<0.2 grams)   - Date DSA Last Checked: Not Known       Latest DSA: No   - BK Viremia: Not checked recently   - Kidney Tx Biopsy: No    # Immunosuppression: Tacrolimus immediate release (goal 3-5), Azathioprine (dose Take 1 tablet (50 mg) by mouth daily ) and Prednisone (dose 5 mg daily)   - Changes: No    # Prophylaxis:   - PJP: None    # Hypertension: Controlled; Goal BP: < 130/80   - Changes: No    # Anemia in Chronic Renal Disease: Hgb: Stable      JUDITH: No   - Iron studies: Not checked recently    # Mineral Bone Disorder:   - consider dexa scan. Will update PTH and vitamin D      # Electrolytes:   - Potassium; level: Normal   - Magnesium; level: Not checked recently   - Bicarbonate; level: Normal   - Sodium; level: Normal    # Skin Cancer Risk:    - Discussed sun protection and recommend regular follow up with Dermatology.    # Musculoskeletal chest pain: resolved. Was " wondering if she needs to update her cardiac testing which seemed reasonable. Will update an echo and EKG     # Age appropriate cancer screening and vaccination update: we discussed shingrix and flu vaccine. Mammogram and pap smears with her pcp.   # Medical Compliance: Yes    # COVID-19 Virus Review: Discussed COVID-19 virus and the potential medical risks.  Reviewed preventative health recommendations, which includes washing hands for 20 seconds, avoid touching your face, and social distancing.  Asked patient to inform the transplant center if they are exposed or diagnosed with this virus.    # Transplant History:  Etiology of kidney failure: Unknown etiology  Tx: LDKT  Transplant: 6/23/2010 (Kidney)  Donor Type: Living Donor Class:   Significant changes in immunosuppression: None  Significant transplant-related complications: None    Transplant Office Phone Number: [phone]    Assessment and plan was discussed with the patient and she voiced her understanding and agreement.    Return Visit: one year       Nena Underwood has a clinical telephone visit for routine follow up and immunosuppression management.    History of Present Illness   Some musculoskeletal complaints due pulled chest muscle. Doing well otherwise. She gave us release of info to be able to review her labs in care everywhere. She is taking her medications regularly.     Recent Hospitalizations:  [x] No [] Yes    New Medical Issues: [x] No [] Yes    Decreased energy: [x] No [] Yes    Chest pain or SOB with exertion:  [x] No [] Yes    Appetite change or weight change: [x] No [] Yes    Nausea, vomiting or diarrhea:  [x] No [] Yes    Fever, sweats or chills: [x] No [] Yes    Leg swelling: [x] No [] Yes      Home BP: 120/80    Review of Systems   A comprehensive review of systems was obtained and negative, except as noted in the HPI or PMH.    I have reviewed and updated the patient's Past Medical History, Social History, and Medication  List.    Active Medical Problems  Patient Active Problem List   Diagnosis     Kidney replaced by transplant     Aftercare following organ transplant     CARDIOVASCULAR SCREENING; LDL GOAL LESS THAN 100     Immunosuppressed status (H)     Moraxella catarrhalis pneumonia (H)     Pyelonephritis     Cervical cancer screening     History of recurrent urinary tract infection     EBV (Jaime-Barr virus) viremia     Vitamin D deficiency     Allergies  Nkda [no known drug allergies]    Medications  Current Outpatient Medications   Medication Sig     Acetaminophen (TYLENOL PO) Take 500 mg by mouth every 4 hours as needed for mild pain or fever     azaTHIOprine (IMURAN) 50 MG tablet Take 1 tablet (50 mg) by mouth daily Labs required for refills     etonogestrel (IMPLANON/NEXPLANON) 68 MG IMPL 1 each (68 mg) by Subdermal route once for 1 dose     predniSONE (DELTASONE) 5 MG tablet Take 1 tablet (5 mg) by mouth daily     sulfamethoxazole-trimethoprim (BACTRIM) 400-80 MG tablet Take 1 tablet by mouth daily     tacrolimus (ENVARSUS XR) 1 MG 24 hr tablet Take 1 tablet (1 mg) by mouth every morning (before breakfast)     sulfamethoxazole-trimethoprim (BACTRIM/SEPTRA) 400-80 MG tablet Take 1 tablet by mouth daily     Current Facility-Administered Medications   Medication     etonogestrel (NEXPLANON) subdermal implant 68 mg       Video call contact time:  25 min     Joss Rubin MD

## 2020-10-16 ENCOUNTER — OFFICE VISIT (OUTPATIENT)
Dept: FAMILY MEDICINE | Facility: CLINIC | Age: 45
End: 2020-10-16
Payer: COMMERCIAL

## 2020-10-16 VITALS
HEIGHT: 59 IN | TEMPERATURE: 97.2 F | DIASTOLIC BLOOD PRESSURE: 80 MMHG | OXYGEN SATURATION: 99 % | BODY MASS INDEX: 22.58 KG/M2 | SYSTOLIC BLOOD PRESSURE: 112 MMHG | HEART RATE: 66 BPM | WEIGHT: 112 LBS

## 2020-10-16 DIAGNOSIS — I42.9 SECONDARY CARDIOMYOPATHY (H): Primary | ICD-10-CM

## 2020-10-16 DIAGNOSIS — Z23 NEED FOR IMMUNIZATION AGAINST INFLUENZA: ICD-10-CM

## 2020-10-16 DIAGNOSIS — D84.9 IMMUNOSUPPRESSED STATUS (H): ICD-10-CM

## 2020-10-16 DIAGNOSIS — Z94.0 KIDNEY REPLACED BY TRANSPLANT: ICD-10-CM

## 2020-10-16 DIAGNOSIS — L98.9 SKIN LESION: ICD-10-CM

## 2020-10-16 PROCEDURE — 90686 IIV4 VACC NO PRSV 0.5 ML IM: CPT | Performed by: FAMILY MEDICINE

## 2020-10-16 PROCEDURE — 90471 IMMUNIZATION ADMIN: CPT | Performed by: FAMILY MEDICINE

## 2020-10-16 PROCEDURE — 99214 OFFICE O/P EST MOD 30 MIN: CPT | Mod: 25 | Performed by: FAMILY MEDICINE

## 2020-10-16 ASSESSMENT — MIFFLIN-ST. JEOR: SCORE: 1049.46

## 2020-10-16 NOTE — PROGRESS NOTES
SUBJECTIVE:   CC: Nena Underwood is an 45 year old woman who presents for   Discussing skin exam and cardiac exam related with her transplants     Today's PHQ-2 Score:   PHQ-2 (  [employer]) 3/6/2020 2019   Q1: Little interest or pleasure in doing things 0 0   Q2: Feeling down, depressed or hopeless 0 0   PHQ-2 Score 0 0       Abuse: Current or Past(Physical, Sexual or Emotional)- No  Do you feel safe in your environment? Yes        Social History     Tobacco Use     Smoking status: Never Smoker     Smokeless tobacco: Never Used   Substance Use Topics     Alcohol use: No     Alcohol/week: 0.0 standard drinks     If you drink alcohol do you typically have >3 drinks per day or >7 drinks per week? No                     Reviewed orders with patient.  Reviewed health maintenance and updated orders accordingly - Yes  BP Readings from Last 3 Encounters:   10/16/20 112/80   20 110/62   19 124/78    Wt Readings from Last 3 Encounters:   10/16/20 50.8 kg (112 lb)   20 50.3 kg (111 lb)   19 49 kg (108 lb)                  Patient Active Problem List   Diagnosis     Kidney replaced by transplant     Aftercare following organ transplant     CARDIOVASCULAR SCREENING; LDL GOAL LESS THAN 100     Immunosuppressed status (H)     Moraxella catarrhalis pneumonia (H)     Pyelonephritis     Cervical cancer screening     History of recurrent urinary tract infection     EBV (Jaime-Barr virus) viremia     Vitamin D deficiency     Past Surgical History:   Procedure Laterality Date      SECTION  2014    Procedure:  SECTION;;  Surgeon: Griselda Becerra MD;  [facility]     HEMODIALYSIS VIA AV FISTULA      left arm     INSERT INTRAUTERINE DEVICE      Paraguard. removed      REMOVE INTRAUTERINE DEVICE  2017     TRANSPLANT KIDNEY RECIPIENT LIVING UNRELATED  6/23/10       Social History     Tobacco Use     Smoking status: Never Smoker     Smokeless tobacco: Never  Used   Substance Use Topics     Alcohol use: No     Alcohol/week: 0.0 standard drinks     Family History   Problem Relation Age of Onset     Respiratory Paternal Grandmother         MARIKA had pulmonary TB at age 85, then  of old age at 89; she lived with [facility]     Eye Disorder Mother         retinal problems         Current Outpatient Medications   Medication Sig Dispense Refill     Acetaminophen (TYLENOL PO) Take 500 mg by mouth every 4 hours as needed for mild pain or fever       azaTHIOprine (IMURAN) 50 MG tablet Take 1 tablet (50 mg) by mouth daily Labs required for refills 90 tablet 3     predniSONE (DELTASONE) 5 MG tablet Take 1 tablet (5 mg) by mouth daily 90 tablet 3     sulfamethoxazole-trimethoprim (BACTRIM) 400-80 MG tablet Take 1 tablet by mouth daily 90 tablet 3     sulfamethoxazole-trimethoprim (BACTRIM) 400-80 MG tablet Take 1 tablet by mouth daily 90 tablet 3     tacrolimus (ENVARSUS XR) 1 MG 24 hr tablet Take 1 tablet (1 mg) by mouth every morning (before breakfast) 30 tablet 11     etonogestrel (IMPLANON/NEXPLANON) 68 MG IMPL 1 each (68 mg) by Subdermal route once for 1 dose 1 each 0     Allergies   Allergen Reactions     Nkda [No Known Drug Allergies]      Recent Labs   Lab Test 20  0909 19  0806 18  0844 18  0844 18  0045 18  0045 17  1816 17  1816 17  2136 17  2136 17  0850 17  0850   LDL  --  106*  --  89  --   --   --   --   --   --   --  50   HDL  --  54  --  63  --   --   --   --   --   --   --  53   TRIG  --  95  --  68  --   --   --   --   --   --   --  74   ALT  --   --   --   --   --  20  --  22  --  36  --   --    CR 0.91 1.14*   < > 0.81   < > 0.88   < > 1.32*   < > 1.22*   < >  --    GFRESTIMATED 75 57*   < > 76   < > 70   < > 44*   < > 48*   < >  --    GFRESTBLACK 87 66   < > >90   < > 84   < > 53*   < > 58*   < >  --    POTASSIUM 4.4 3.9   < > 4.0   < > 3.4   < > 4.0   < > 3.8   < >  --     < > = values in  "this interval not displayed.      Reviewed and updated as needed this visit by clinical staff  Tobacco  Allergies  Meds              Reviewed and updated as needed this visit by Provider                Past Medical History:   Diagnosis Date     Anemia      High risk medication use      Hyperparathyroidism, secondary renal (H)      Immunosuppressed status (H)      Kidney replaced by transplant 6/23/10    KIDNEY TRANSPLANT STATUS     Moraxella catarrhalis pneumonia (HCC) 3/28/2016     Pneumonia 2013    requiring hospitalization     Renal aplasia     since birth     Single seizure (H)     felt secondary to uremia     TB (pulmonary tuberculosis)     treated with 4 drug regimen (INH, rifampin, ethambutol and pyrazinamide) for 6 months      Past Surgical History:   Procedure Laterality Date      SECTION  2014    Procedure:  SECTION;;  Surgeon: Griselda Becerra MD;  [facility]     HEMODIALYSIS VIA AV FISTULA      left arm     INSERT INTRAUTERINE DEVICE      Paraguard. removed      REMOVE INTRAUTERINE DEVICE  2017     TRANSPLANT KIDNEY RECIPIENT LIVING UNRELATED  6/23/10       ROS:  CONSTITUTIONAL: NEGATIVE for fever, chills, change in weight  EYES: NEGATIVE for vision changes or irritation  ENT: NEGATIVE for ear, mouth and throat problems  CV: NEGATIVE for chest pain, palpitations or peripheral edema  GI: NEGATIVE for nausea, abdominal pain, heartburn, or change in bowel habits  MUSCULOSKELETAL: NEGATIVE for significant arthralgias or myalgia  PSYCHIATRIC: NEGATIVE for changes in mood or affect    OBJECTIVE:   /80   Pulse 66   Temp 97.2  F (36.2  C) (Tympanic)   Ht 1.5 m (4' 11.05\")   Wt 50.8 kg (112 lb)   SpO2 99%   BMI 22.58 kg/m    EXAM:  GENERAL: healthy, alert and no distress  EYES: Eyes grossly normal to inspection, PERRL and conjunctivae and sclerae normal  RESP: lungs clear to auscultation - no rales, rhonchi or wheezes  CV: regular rate and " "rhythm, normal S1 S2, no S3 or S4, no murmur, click or rub, no peripheral edema and peripheral pulses strong  MS: no gross musculoskeletal defects noted, no edema        ASSESSMENT/PLAN:   1. Secondary cardiomyopathy (H)  Has been on prednisone and immune suppressant for over 4 years after renal transplants, has worsening SOB/tiredness with mild edema, will have her to check echocardiogram for further evaluation   - Echocardiogram Complete; Future    2. Skin lesion  Currently on immune suppressant and prednisone as mentioned above   Will have her to do skin cancer screening test   - SKIN CARE REFERRAL    3. Need for immunization against influenza    - INFLUENZA VACCINE IM > 6 MONTHS VALENT IIV4 [84064]    She will reschedule PAP/lab and CPE with female provider, she wants to discuss DEXA scan for her long term use of prednisone related with transplant at the CPE      Estimated body mass index is 22.58 kg/m  as calculated from the following:    Height as of this encounter: 1.5 m (4' 11.05\").    Weight as of this encounter: 50.8 kg (112 lb).  She reports that she has never smoked. She has never used smokeless tobacco.    Jorge L Tucker MD  [facility]  "

## 2020-11-06 ENCOUNTER — OFFICE VISIT (OUTPATIENT)
Dept: FAMILY MEDICINE | Facility: CLINIC | Age: 45
End: 2020-11-06
Payer: COMMERCIAL

## 2020-11-06 ENCOUNTER — HOSPITAL ENCOUNTER (OUTPATIENT)
Dept: CARDIOLOGY | Facility: CLINIC | Age: 45
Discharge: HOME | End: 2020-11-06
Attending: FAMILY MEDICINE | Admitting: FAMILY MEDICINE
Payer: COMMERCIAL

## 2020-11-06 VITALS
HEART RATE: 71 BPM | DIASTOLIC BLOOD PRESSURE: 88 MMHG | HEIGHT: 59 IN | WEIGHT: 111 LBS | TEMPERATURE: 98 F | BODY MASS INDEX: 22.38 KG/M2 | SYSTOLIC BLOOD PRESSURE: 120 MMHG | OXYGEN SATURATION: 98 %

## 2020-11-06 VITALS — DIASTOLIC BLOOD PRESSURE: 72 MMHG | SYSTOLIC BLOOD PRESSURE: 120 MMHG

## 2020-11-06 DIAGNOSIS — D22.9 MULTIPLE BENIGN NEVI: ICD-10-CM

## 2020-11-06 DIAGNOSIS — Z94.0 KIDNEY REPLACED BY TRANSPLANT: ICD-10-CM

## 2020-11-06 DIAGNOSIS — D84.9 IMMUNOSUPPRESSED STATUS (H): ICD-10-CM

## 2020-11-06 DIAGNOSIS — Z00.00 ENCOUNTER FOR ROUTINE ADULT HEALTH EXAMINATION WITHOUT ABNORMAL FINDINGS: Primary | ICD-10-CM

## 2020-11-06 DIAGNOSIS — Z12.4 SCREENING FOR CERVICAL CANCER: ICD-10-CM

## 2020-11-06 DIAGNOSIS — I42.9 SECONDARY CARDIOMYOPATHY (H): ICD-10-CM

## 2020-11-06 DIAGNOSIS — L81.4 LENTIGINES: ICD-10-CM

## 2020-11-06 DIAGNOSIS — Z94.0 KIDNEY TRANSPLANT RECIPIENT: Primary | ICD-10-CM

## 2020-11-06 PROCEDURE — 93306 TTE W/DOPPLER COMPLETE: CPT | Mod: 26 | Performed by: INTERNAL MEDICINE

## 2020-11-06 PROCEDURE — G0145 SCR C/V CYTO,THINLAYER,RESCR: HCPCS | Performed by: PHYSICIAN ASSISTANT

## 2020-11-06 PROCEDURE — 99214 OFFICE O/P EST MOD 30 MIN: CPT | Performed by: PHYSICIAN ASSISTANT

## 2020-11-06 PROCEDURE — 99396 PREV VISIT EST AGE 40-64: CPT | Performed by: PHYSICIAN ASSISTANT

## 2020-11-06 PROCEDURE — 87624 HPV HI-RISK TYP POOLED RSLT: CPT | Performed by: PHYSICIAN ASSISTANT

## 2020-11-06 PROCEDURE — 93306 TTE W/DOPPLER COMPLETE: CPT

## 2020-11-06 ASSESSMENT — MIFFLIN-ST. JEOR: SCORE: 1044.12

## 2020-11-06 NOTE — LETTER
2020         RE: Nena Underwood  [id number] [facility] [address]        Dear Colleague,    [facility] you [facility]wood[facility]. Please see a copy of my visit note below.    HPI:  Nena Underwood is a 45 year old female patient here today for FBE. S/p kidney transplant. Has moles on body .  Patient states this has been present for since childhood.  Patient reports the following symptoms: none .  Patient reports the following previous treatments: none.  Patient reports the following modifying factors: none.  Associated symptoms: none.  Patient has no other skin complaints today.  Remainder of the HPI, Meds, PMH, Allergies, FH, and SH was reviewed in chart.    Pertinent Hx:   Kidney transplant on prednisone, imuran, tacrolimus  Past Medical History:   Diagnosis Date     Anemia      High risk medication use      Hyperparathyroidism, secondary renal (H)      Immunosuppressed status (H)      Kidney replaced by transplant 6/23/10    KIDNEY TRANSPLANT STATUS     Moraxella catarrhalis pneumonia (HCC) 3/28/2016     Pneumonia 2013    requiring hospitalization     Renal aplasia     since birth     Single seizure (H)     felt secondary to uremia     TB (pulmonary tuberculosis)     treated with 4 drug regimen (INH, rifampin, ethambutol and pyrazinamide) for 6 months       Past Surgical History:   Procedure Laterality Date      SECTION  2014    Procedure:  SECTION;;  Surgeon: Griselda Becerra MD;  [facility]     HEMODIALYSIS VIA AV FISTULA      left arm     INSERT INTRAUTERINE DEVICE      Paraguard. removed      REMOVE INTRAUTERINE DEVICE  2017     TRANSPLANT KIDNEY RECIPIENT LIVING UNRELATED  6/23/10        Family History   Problem Relation Age of Onset     Respiratory Paternal Grandmother         PGM had pulmonary TB at age 85, then  of old age at 89; she lived with [facility]  Disorder Mother         retinal problems       Social History     Socioeconomic History     Marital status:      Spouse name: Not on file     Number of children: Not on file     Years of education: Not on file     Highest education level: Not on file   Occupational History     Occupation:      Comment: [employer]      Occupation:      Employer: [employer]   Social Needs     Financial resource strain: Not on file     Food insecurity     Worry: Not on file     Inability: Not on file     Transportation needs     Medical: Not on file     Non-medical: Not on file   Tobacco Use     Smoking status: Never Smoker     Smokeless tobacco: Never Used   Substance and Sexual Activity     Alcohol use: No     Alcohol/week: 0.0 standard drinks     Drug use: No     Sexual activity: Yes     Partners: Male   Lifestyle     Physical activity     Days per week: Not on file     Minutes per session: Not on file     Stress: Not on file   Relationships     Social connections     Talks on phone: Not on file     Gets together: Not on file     Attends Jew service: Not on file     Active member of club or organization: Not on file     Attends meetings of clubs or organizations: Not on file     Relationship status: Not on file     Intimate partner violence     Fear of current or ex partner: Not on file     Emotionally abused: Not on file     Physically abused: Not on file     Forced sexual activity: Not on file   Other Topics Concern     Parent/sibling w/ CABG, MI or angioplasty before 65F 55M? Not Asked      Service No     Blood Transfusions No     Caffeine Concern Not Asked     Occupational Exposure Not Asked     Hobby Hazards Not Asked     Sleep Concern Not Asked     Stress Concern Not Asked     Weight Concern No     Special Diet Not Asked     Back Care Not Asked     Exercise No     Bike Helmet Not Asked     Comment: na     Seat Belt Yes     Self-Exams Not Asked   Social History Narrative    Ms.  Kj emigrated from the [facility] in [place], [place]. She is currently living with her  in [place], [place].  She works for the [employer] in [place] (a ).        Outpatient Encounter Medications as of 11/6/2020   Medication Sig Dispense Refill     Acetaminophen (TYLENOL PO) Take 500 mg by mouth every 4 hours as needed for mild pain or fever       azaTHIOprine (IMURAN) 50 MG tablet Take 1 tablet (50 mg) by mouth daily Labs required for refills 90 tablet 3     predniSONE (DELTASONE) 5 MG tablet Take 1 tablet (5 mg) by mouth daily 90 tablet 3     sulfamethoxazole-trimethoprim (BACTRIM) 400-80 MG tablet Take 1 tablet by mouth daily 90 tablet 3     tacrolimus (ENVARSUS XR) 1 MG 24 hr tablet Take 1 tablet (1 mg) by mouth every morning (before breakfast) 30 tablet 11     etonogestrel (IMPLANON/NEXPLANON) 68 MG IMPL 1 each (68 mg) by Subdermal route once for 1 dose 1 each 0     [DISCONTINUED] sulfamethoxazole-trimethoprim (BACTRIM) 400-80 MG tablet Take 1 tablet by mouth daily 90 tablet 3     Facility-Administered Encounter Medications as of 11/6/2020   Medication Dose Route Frequency Provider Last Rate Last Dose     etonogestrel (NEXPLANON) subdermal implant 68 mg  1 each Subdermal Once Brad Werner PA-C           Review Of Systems:  Skin: moles  Eyes: negative  Ears/Nose/Throat: negative  Respiratory: No shortness of breath, dyspnea on exertion, cough, or hemoptysis  Cardiovascular: negative  Gastrointestinal: negative  Genitourinary: negative  Musculoskeletal: negative  Neurologic: negative  Psychiatric: negative  Hematologic/Lymphatic/Immunologic: negative  Endocrine: negative      Objective:     /72   Eyes: Conjunctivae/lids: Normal   ENT: Lips:  Normal  MSK: Normal  Cardiovascular: Peripheral edema none  Pulm: Breathing Normal  Neuro/Psych: Orientation: A/O x 3. Normal; Mood/Affect: Normal, NAD, WDWN  Pt accompanied by: self  Following areas examined: Scalp, face, eyelids, lips, neck,  chest, abdomen, back, buttock, and R&L upper and lower extremities. Pt defers exam of  hips, groin and genitals.   Whtiney skin type:iv   Findings:  Well circumscribed macules with symmetric color distribution on trunk and extremities.  Tan WD smooth macules on face, neck, trunk, and extremities.      Assessment and Plan:     1)  Benign nevi, Lentigines     I discussed the specifics of tumor, prognosis, and genetics of benign lesions.  I explained that treatment of these lesions would be purely cosmetic and not medically neccessary.  I discussed with patient different removal options including excision, cryotherapy, cautery and /or laser.  Lesion may recur and/or may not completely resolve. May need additional treatment.     2)kidney transplant patient  Signs and Symptoms of non-melanoma skin cancer and ABCDEs of melanoma reviewed with patient. Patient encouraged to perform monthly self skin exams and educated on how to perform them. UV precautions reviewed with patient. Patient was asked about new or changing moles/lesions on body.   Wear a sunscreen with at least SPF 30 on your face, ears, neck and V of the chest daily. Wear sunscreen on other areas of the body if those areas are exposed to the sun throughout the day. Sunscreens can contain physical and/or chemical blockers. Physical blockers are less likely to clog pores, these include zinc oxide and titanium dioxide. Reapply every two hour and after swimming. Sunscreen examples include Neutrogena, CeraVe, Blue Lizard, Elta MD and many others.    Proper skin care from [facility]:    -Eliminate harsh soaps as they strip the natural oils from the skin, often resulting in dry itchy skin ( i.e. Dial, Zest, [place])  -Use mild soaps such as Cetaphil or Dove Sensitive Skin in the shower. You do not need to use soap on arms, legs, and trunk every time you shower unless visibly soiled.   -Avoid hot or cold showers.  -After showering, lightly dry off  and apply moisturizing within 2-3 minutes. This will help trap moisture in the skin.   -Aggressive use of a moisturizer at least 1-2 times a day to the entire body (including -Vanicream, Cetaphil, Aquaphor or Cerave) and moisturize hands after every washing.  -We recommend using moisturizers that come in a tub that needs to be scooped out, not a pump. This has more of an oil base. It will hold moisture in your skin much better than a water base moisturizer. The above recommended are non-pore clogging.               Follow up in yearly FBE        Again, thank you for allowing me to participate in the care of your patient.        Sincerely,        Nasima Chandler PA-C

## 2020-11-06 NOTE — PROGRESS NOTES
SUBJECTIVE:   CC: Nena Underwood is an 45 year old woman who presents for preventive health visit.       Healthy Habits:    Do you get at least three servings of calcium containing foods daily (dairy, green leafy vegetables, etc.)? yes    Amount of exercise or daily activities, outside of work: 1 hour(s) per day    Problems taking medications regularly No    Medication side effects: No    Have you had an eye exam in the past two years? yes    Do you see a dentist twice per year? yes    Do you have sleep apnea, excessive snoring or daytime drowsiness?no      Today's PHQ-2 Score:   PHQ-2 (  [employer]) 3/6/2020 2019   Q1: Little interest or pleasure in doing things 0 0   Q2: Feeling down, depressed or hopeless 0 0   PHQ-2 Score 0 0       Abuse: Current or Past(Physical, Sexual or Emotional)- No  Do you feel safe in your environment? Yes        Social History     Tobacco Use     Smoking status: Never Smoker     Smokeless tobacco: Never Used   Substance Use Topics     Alcohol use: No     Alcohol/week: 0.0 standard drinks     If you drink alcohol do you typically have >3 drinks per day or >7 drinks per week? No                     Reviewed orders with patient.  Reviewed health maintenance and updated orders accordingly - Yes  Patient Active Problem List   Diagnosis     Kidney replaced by transplant     Aftercare following organ transplant     CARDIOVASCULAR SCREENING; LDL GOAL LESS THAN 100     Immunosuppressed status (H)     Moraxella catarrhalis pneumonia (H)     Pyelonephritis     Cervical cancer screening     History of recurrent urinary tract infection     EBV (Jaime-Barr virus) viremia     Vitamin D deficiency     Past Surgical History:   Procedure Laterality Date      SECTION  2014    Procedure:  SECTION;;  Surgeon: Griselda Becerra MD;  [facility]     HEMODIALYSIS VIA AV FISTULA      left arm     INSERT INTRAUTERINE DEVICE      Paraguard. removed      REMOVE  INTRAUTERINE DEVICE  2017     TRANSPLANT KIDNEY RECIPIENT LIVING UNRELATED  6/23/10       Social History     Tobacco Use     Smoking status: Never Smoker     Smokeless tobacco: Never Used   Substance Use Topics     Alcohol use: No     Alcohol/week: 0.0 standard drinks     Family History   Problem Relation Age of Onset     Respiratory Paternal Grandmother         MARIKA had pulmonary TB at age 85, then  of old age at 89; she lived with [facility]     Eye Disorder Mother         retinal problems         Current Outpatient Medications   Medication Sig Dispense Refill     Acetaminophen (TYLENOL PO) Take 500 mg by mouth every 4 hours as needed for mild pain or fever       azaTHIOprine (IMURAN) 50 MG tablet Take 1 tablet (50 mg) by mouth daily Labs required for refills 90 tablet 3     predniSONE (DELTASONE) 5 MG tablet Take 1 tablet (5 mg) by mouth daily 90 tablet 3     sulfamethoxazole-trimethoprim (BACTRIM) 400-80 MG tablet Take 1 tablet by mouth daily 90 tablet 3     tacrolimus (ENVARSUS XR) 1 MG 24 hr tablet Take 1 tablet (1 mg) by mouth every morning (before breakfast) 30 tablet 11     etonogestrel (IMPLANON/NEXPLANON) 68 MG IMPL 1 each (68 mg) by Subdermal route once for 1 dose 1 each 0     Allergies   Allergen Reactions     Nkda [No Known Drug Allergies]      Recent Labs   Lab Test 20  0909 19  0806 18  0844 18  0844 18  0045 18  0045 17  1816 17  1816 17  2136 17  2136 17  0850 17  0850   LDL  --  106*  --  89  --   --   --   --   --   --   --  50   HDL  --  54  --  63  --   --   --   --   --   --   --  53   TRIG  --  95  --  68  --   --   --   --   --   --   --  74   ALT  --   --   --   --   --  20  --  22  --  36  --   --    CR 0.91 1.14*   < > 0.81   < > 0.88   < > 1.32*   < > 1.22*   < >  --    GFRESTIMATED 75 57*   < > 76   < > 70   < > 44*   < > 48*   < >  --    GFRESTBLACK 87 66   < > >90   < > 84   < > 53*   < > 58*   < >  --     POTASSIUM 4.4 3.9   < > 4.0   < > 3.4   < > 4.0   < > 3.8   < >  --     < > = values in this interval not displayed.        Mammogram Screening: Patient under age 50, mutual decision reflected in health maintenance.      Pertinent mammograms are reviewed under the imaging tab.  History of abnormal Pap smear:   Last 3 Pap Results:   PAP (no units)   Date Value   2018 NIL   2017 NIL   2014 NIL     Last 3 Pap and HPV Results:   PAP / HPV Latest Ref Rng & Units 2018   PAP - NIL NIL NIL   HPV 16 DNA NEG:Negative Negative Negative -   HPV 18 DNA NEG:Negative Negative Negative -   OTHER HR HPV NEG:Negative Negative Negative -     PAP / HPV Latest Ref Rng & Units 2018   PAP - NIL NIL NIL   HPV 16 DNA NEG:Negative Negative Negative -   HPV 18 DNA NEG:Negative Negative Negative -   OTHER HR HPV NEG:Negative Negative Negative -     Reviewed and updated as needed this visit by clinical staff  Tobacco  Allergies  Meds   Med Hx  Surg Hx  Fam Hx          Reviewed and updated as needed this visit by Provider      Med Hx  Surg Hx  Fam Hx         Past Medical History:   Diagnosis Date     Anemia      High risk medication use      Hyperparathyroidism, secondary renal (H)      Immunosuppressed status (H)      Kidney replaced by transplant 6/23/10    KIDNEY TRANSPLANT STATUS     Moraxella catarrhalis pneumonia (HCC) 3/28/2016     Pneumonia 2013    requiring hospitalization     Renal aplasia     since birth     Single seizure (H)     felt secondary to uremia     TB (pulmonary tuberculosis)     treated with 4 drug regimen (INH, rifampin, ethambutol and pyrazinamide) for 6 months      Past Surgical History:   Procedure Laterality Date      SECTION  2014    Procedure:  SECTION;;  Surgeon: Griselda Becerra MD;  [facility]     HEMODIALYSIS VIA AV FISTULA      left arm     INSERT INTRAUTERINE DEVICE      Paraguard.  "removed      REMOVE INTRAUTERINE DEVICE  2017     TRANSPLANT KIDNEY RECIPIENT LIVING UNRELATED  6/23/10     OB History    Para Term  AB Living   1 1 0 1 0 1   SAB TAB Ectopic Multiple Live Births   0 0 0 0 1      # Outcome Date GA Lbr Karri/2nd Weight Sex Delivery Anes PTL Lv   1  14 34w0d  2.58 kg (5 lb 11 oz) M CS-LTranv  Y JEREMÍAS      Name: TOMMY,BABYTea KEANU A      Apgar1: 3  Apgar5: 5       ROS:  CONSTITUTIONAL: NEGATIVE for fever, chills, change in weight  INTEGUMENTARU/SKIN: NEGATIVE for worrisome rashes, moles or lesions  EYES: NEGATIVE for vision changes or irritation  ENT: NEGATIVE for ear, mouth and throat problems  RESP: NEGATIVE for significant cough or SOB  BREAST: NEGATIVE for masses, tenderness or discharge  CV: NEGATIVE for chest pain, palpitations or peripheral edema  GI: NEGATIVE for nausea, abdominal pain, heartburn, or change in bowel habits  : NEGATIVE for unusual urinary or vaginal symptoms. Periods are regular.  MUSCULOSKELETAL: NEGATIVE for significant arthralgias or myalgia  NEURO: NEGATIVE for weakness, dizziness or paresthesias  PSYCHIATRIC: NEGATIVE for changes in mood or affect    OBJECTIVE:   /88   Pulse 71   Temp 98  F (36.7  C) (Tympanic)   Ht 1.499 m (4' 11\")   Wt 50.3 kg (111 lb)   SpO2 98%   BMI 22.42 kg/m    EXAM:  GENERAL: healthy, alert and no distress  EYES: Eyes grossly normal to inspection, PERRL and conjunctivae and sclerae normal  HENT: ear canals and TM's normal, nose and mouth without ulcers or lesions  NECK: no adenopathy, no asymmetry, masses, or scars and thyroid normal to palpation  RESP: lungs clear to auscultation - no rales, rhonchi or wheezes  CV: regular rate and rhythm, normal S1 S2, no S3 or S4, no murmur, click or rub, no peripheral edema and peripheral pulses strong  ABDOMEN: soft, nontender, no hepatosplenomegaly, no masses and bowel sounds normal   (female): normal female external genitalia, normal urethral " "meatus, vaginal mucosa, normal cervix/adnexa/uterus without masses, small amount of brown cervical mucous noted  MS: no gross musculoskeletal defects noted, no edema  SKIN: no suspicious lesions or rashes  NEURO: Normal strength and tone, mentation intact and speech normal  PSYCH: mentation appears normal, affect normal/bright    Diagnostic Test Results:  none     ASSESSMENT/PLAN:   1. Encounter for routine adult health examination without abnormal findings    2. Immunosuppressed status (H)    3. Kidney replaced by transplant    4. Screening for cervical cancer  Per transplant team, she has been advised to have annual paps for surveillance due to immunosuppressed status.   - HPV High Risk Types DNA Cervical  - Pap imaged thin layer screen with HPV - recommended age 30 - 65 years (select HPV order below)          COUNSELING:   Reviewed preventive health counseling, as reflected in patient instructions       Regular exercise       Healthy diet/nutrition       Contraception       (Isabella)menopause management    Estimated body mass index is 22.42 kg/m  as calculated from the following:    Height as of this encounter: 1.499 m (4' 11\").    Weight as of this encounter: 50.3 kg (111 lb).        She reports that she has never smoked. She has never used smokeless tobacco.      Counseling Resources:  ATP IV Guidelines  Pooled Cohorts Equation Calculator  Breast Cancer Risk Calculator  BRCA-Related Cancer Risk Assessment: FHS-7 Tool  FRAX Risk Assessment  ICSI Preventive Guidelines  Dietary Guidelines for Americans, 2010  USDA's MyPlate  ASA Prophylaxis  Lung CA Screening    Brad Werner PA-C  [facility]  "

## 2020-11-06 NOTE — PATIENT INSTRUCTIONS
Wear a broad spectrum (covers UVA and UVB) sunscreen with at least SPF 30 on your face, ears, neck and V of the chest daily. Wear sunscreen on other areas of the body if those areas are exposed to the sun throughout the day. Sunscreens can contain physical and/or chemical blockers. Physical blockers are less likely to clog pores, these include zinc oxide and titanium dioxide. Reapply every two hour and after swimming. Sunscreen examples include Neutrogena, CeraVe, Kojo Nettles, Elta MD and many others.    UV radiation  UVA radiation remains constant throughout the day and throughout the year. It is a longer wavelength than UVB and therefore penetrates deeper into the skin leading to immediate and delayed tanning, photoaging, and skin cancer. 70-80% of UVA and UVB radiation occurs between the hours of 10am-2pm.  UVB radiation  UVB radiation causes the most harmful effects and is more significant during the summer months. However, snow and ice can reflect UVB radiation leading to skin damage during the winter months as well. UVB radiation is responsible for tanning, burning, inflammation, delayed erythema (pinkness), pigmentation (brown spots), and skin cancer.

## 2020-11-06 NOTE — PROGRESS NOTES
HPI:  Nena Underwood is a 45 year old female patient here today for FBE. S/p kidney transplant. Has moles on body .  Patient states this has been present for since childhood.  Patient reports the following symptoms: none .  Patient reports the following previous treatments: none.  Patient reports the following modifying factors: none.  Associated symptoms: none.  Patient has no other skin complaints today.  Remainder of the HPI, Meds, PMH, Allergies, FH, and SH was reviewed in chart.    Pertinent Hx:   Kidney transplant on prednisone, imuran, tacrolimus  Past Medical History:   Diagnosis Date     Anemia      High risk medication use      Hyperparathyroidism, secondary renal (H)      Immunosuppressed status (H)      Kidney replaced by transplant 6/23/10    KIDNEY TRANSPLANT STATUS     Moraxella catarrhalis pneumonia (HCC) 3/28/2016     Pneumonia 2013    requiring hospitalization     Renal aplasia     since birth     Single seizure (H)     felt secondary to uremia     TB (pulmonary tuberculosis)     treated with 4 drug regimen (INH, rifampin, ethambutol and pyrazinamide) for 6 months       Past Surgical History:   Procedure Laterality Date      SECTION  2014    Procedure:  SECTION;;  Surgeon: Griselda Becerra MD;  [facility]     HEMODIALYSIS VIA AV FISTULA      left arm     INSERT INTRAUTERINE DEVICE      Paraguard. removed      REMOVE INTRAUTERINE DEVICE  2017     TRANSPLANT KIDNEY RECIPIENT LIVING UNRELATED  6/23/10        Family History   Problem Relation Age of Onset     Respiratory Paternal Grandmother         PGM had pulmonary TB at age 85, then  of old age at 89; she lived with Nena     Eye Disorder Mother         retinal problems       Social History     Socioeconomic History     Marital status:      Spouse name: Not on file     Number of children: Not on file     Years of education: Not on file     Highest education level: Not on file    Occupational History     Occupation:      Comment: [employer]y [employer]      Occupation:      Employer: [employer]   Social Needs     Financial resource strain: Not on file     Food insecurity     Worry: Not on file     Inability: Not on file     Transportation needs     Medical: Not on file     Non-medical: Not on file   Tobacco Use     Smoking status: Never Smoker     Smokeless tobacco: Never Used   Substance and Sexual Activity     Alcohol use: No     Alcohol/week: 0.0 standard drinks     Drug use: No     Sexual activity: Yes     Partners: Male   Lifestyle     Physical activity     Days per week: Not on file     Minutes per session: Not on file     Stress: Not on file   Relationships     Social connections     Talks on phone: Not on file     Gets together: Not on file     Attends Evangelical service: Not on file     Active member of club or organization: Not on file     Attends meetings of clubs or organizations: Not on file     Relationship status: Not on file     Intimate partner violence     Fear of current or ex partner: Not on file     Emotionally abused: Not on file     Physically abused: Not on file     Forced sexual activity: Not on file   Other Topics Concern     Parent/sibling w/ CABG, MI or angioplasty before 65F 55M? Not Asked      Service No     Blood Transfusions No     Caffeine Concern Not Asked     Occupational Exposure Not Asked     Hobby Hazards Not Asked     Sleep Concern Not Asked     Stress Concern Not Asked     Weight Concern No     Special Diet Not Asked     Back Care Not Asked     Exercise No     Bike Helmet Not Asked     Comment: na     Seat Belt Yes     Self-Exams Not Asked   Social History Narrative    Ms. Underwood emigrated from the [facility] in [place], [place]. She is currently living with her  in [place], [place].  She works for the [employer] in [place] (a ).        Outpatient Encounter Medications as of 11/6/2020   Medication Sig Dispense Refill      Acetaminophen (TYLENOL PO) Take 500 mg by mouth every 4 hours as needed for mild pain or fever       azaTHIOprine (IMURAN) 50 MG tablet Take 1 tablet (50 mg) by mouth daily Labs required for refills 90 tablet 3     predniSONE (DELTASONE) 5 MG tablet Take 1 tablet (5 mg) by mouth daily 90 tablet 3     sulfamethoxazole-trimethoprim (BACTRIM) 400-80 MG tablet Take 1 tablet by mouth daily 90 tablet 3     tacrolimus (ENVARSUS XR) 1 MG 24 hr tablet Take 1 tablet (1 mg) by mouth every morning (before breakfast) 30 tablet 11     etonogestrel (IMPLANON/NEXPLANON) 68 MG IMPL 1 each (68 mg) by Subdermal route once for 1 dose 1 each 0     [DISCONTINUED] sulfamethoxazole-trimethoprim (BACTRIM) 400-80 MG tablet Take 1 tablet by mouth daily 90 tablet 3     Facility-Administered Encounter Medications as of 11/6/2020   Medication Dose Route Frequency Provider Last Rate Last Dose     etonogestrel (NEXPLANON) subdermal implant 68 mg  1 each Subdermal Once Brad Werner PA-C           Review Of Systems:  Skin: moles  Eyes: negative  Ears/Nose/Throat: negative  Respiratory: No shortness of breath, dyspnea on exertion, cough, or hemoptysis  Cardiovascular: negative  Gastrointestinal: negative  Genitourinary: negative  Musculoskeletal: negative  Neurologic: negative  Psychiatric: negative  Hematologic/Lymphatic/Immunologic: negative  Endocrine: negative      Objective:     /72   Eyes: Conjunctivae/lids: Normal   ENT: Lips:  Normal  MSK: Normal  Cardiovascular: Peripheral edema none  Pulm: Breathing Normal  Neuro/Psych: Orientation: A/O x 3. Normal; Mood/Affect: Normal, NAD, WDWN  Pt accompanied by: self  Following areas examined: Scalp, face, eyelids, lips, neck, chest, abdomen, back, buttock, and R&L upper and lower extremities. Pt defers exam of  hips, groin and genitals.   Whitney skin type:iv   Findings:  Well circumscribed macules with symmetric color distribution on trunk and extremities.  Lui WD smooth  macules on face, neck, trunk, and extremities.      Assessment and Plan:     1)  Benign nevi, Lentigines     I discussed the specifics of tumor, prognosis, and genetics of benign lesions.  I explained that treatment of these lesions would be purely cosmetic and not medically neccessary.  I discussed with patient different removal options including excision, cryotherapy, cautery and /or laser.  Lesion may recur and/or may not completely resolve. May need additional treatment.     2)kidney transplant patient  Signs and Symptoms of non-melanoma skin cancer and ABCDEs of melanoma reviewed with patient. Patient encouraged to perform monthly self skin exams and educated on how to perform them. UV precautions reviewed with patient. Patient was asked about new or changing moles/lesions on body.   Wear a sunscreen with at least SPF 30 on your face, ears, neck and V of the chest daily. Wear sunscreen on other areas of the body if those areas are exposed to the sun throughout the day. Sunscreens can contain physical and/or chemical blockers. Physical blockers are less likely to clog pores, these include zinc oxide and titanium dioxide. Reapply every two hour and after swimming. Sunscreen examples include Neutrogena, CeraVe, Blue Lizard, Elta MD and many others.    Proper skin care from [facility]:    -Eliminate harsh soaps as they strip the natural oils from the skin, often resulting in dry itchy skin ( i.e. Dial, Zest, Sloven[place] Spring)  -Use mild soaps such as Cetaphil or Dove Sensitive Skin in the shower. You do not need to use soap on arms, legs, and trunk every time you shower unless visibly soiled.   -Avoid hot or cold showers.  -After showering, lightly dry off and apply moisturizing within 2-3 minutes. This will help trap moisture in the skin.   -Aggressive use of a moisturizer at least 1-2 times a day to the entire body (including -Vanicream, Cetaphil, Aquaphor or Cerave) and moisturize hands after every  washing.  -We recommend using moisturizers that come in a tub that needs to be scooped out, not a pump. This has more of an oil base. It will hold moisture in your skin much better than a water base moisturizer. The above recommended are non-pore clogging.               Follow up in yearly FBE

## 2020-11-10 LAB
COPATH REPORT: NORMAL
PAP: NORMAL

## 2021-01-04 ENCOUNTER — TELEPHONE (OUTPATIENT)
Dept: TRANSPLANT | Facility: CLINIC | Age: 46
End: 2021-01-04

## 2021-01-04 NOTE — LETTER
PHYSICIAN ORDERS      DATE & TIME ISSUED: 2021 7:36 AM  PATIENT NAME: Nena Underwood   : 1975     [facility] MR# [if applicable]: [id number]     DIAGNOSIS:  Kidney Transplant  ICD-10 CODE: Z94.0     Please repeat the following labs in 1-2 weeks:  Tacrolimus drug level  CBC  BMP    Any questions please call: [phone]  Please fax lab results to [phone].      Dr. Joss Rubin

## 2021-01-04 NOTE — TELEPHONE ENCOUNTER
ISSUE  Tac level 6.7, goal 3-5, current dose Envarsus 1 mg daily.    PLAN:  Call Nena:  Confirm current dose 1 mg daily.  Confirm good 24 hour trough.  Assess for any issues with vomiting/diarrhea? New meds?  Previous levels on this dose at goal.  Continue current dose and repeat a level in 1-2 weeks.    LPN Task:  Please call with above plan and enter lab orders.  Thanks!

## 2021-01-05 NOTE — TELEPHONE ENCOUNTER
Left message and sent [employer] message to patient regarding:  Tac level 6.7, goal 3-5, current dose Envarsus 1 mg daily.     PLAN:  Call Nena:  Confirm current dose 1 mg daily.  Confirm good 24 hour trough.  Assess for any issues with vomiting/diarrhea? New meds?  Previous levels on this dose at goal.  Continue current dose and repeat a level in 1-2 weeks.

## 2021-01-06 ENCOUNTER — TELEPHONE (OUTPATIENT)
Dept: TRANSPLANT | Facility: CLINIC | Age: 46
End: 2021-01-06

## 2021-01-06 NOTE — TELEPHONE ENCOUNTER
Returned call to [employer].  Explained our recommendation from transplant infectious disease is to get the COVID vaccine.   Nena verbalized understanding.

## 2021-01-06 NOTE — TELEPHONE ENCOUNTER
Patient Call: General  Route to LPN    Reason for call: questions regarding covid 19 vaccine     Call back needed? Yes    Return Call Needed  Same as documented in contacts section  When to return call?: Greater than one day: Route standard priority

## 2021-02-20 ENCOUNTER — HEALTH MAINTENANCE LETTER (OUTPATIENT)
Age: 46
End: 2021-02-20

## 2021-03-09 ENCOUNTER — TELEPHONE (OUTPATIENT)
Dept: TRANSPLANT | Facility: CLINIC | Age: 46
End: 2021-03-09

## 2021-03-09 DIAGNOSIS — Z94.0 KIDNEY TRANSPLANTED: Primary | ICD-10-CM

## 2021-03-09 DIAGNOSIS — Z94.0 KIDNEY REPLACED BY TRANSPLANT: ICD-10-CM

## 2021-03-09 RX ORDER — TACROLIMUS 1 MG/1
1 TABLET, EXTENDED RELEASE ORAL
Qty: 30 TABLET | Refills: 11 | Status: SHIPPED | OUTPATIENT
Start: 2021-03-09 | End: 2022-03-29

## 2021-03-09 NOTE — TELEPHONE ENCOUNTER
Tacrolimus = 6.6  (3/5/21)  Goal 3-5, d/t EBV - last EBV checked 2018, will update  Current  Dose Envarsus 1 mg daily    Previous level at goal on same dose      PLAN:   Call Nena Underwood and confirm this was a good 24-hour trough. Verify dose 1 mg daily.   Confirm no new medications or illness (janice. Diarrhea).   If good trough, stay on the same dose.   Recheck level in 2 weeks and make sure it is a good trough to avoid additional lab draws.  Please add an EBV PCR quant with the Tacrolimus level.     Consent (Spinal Accessory)/Introductory Paragraph: The rationale for Mohs was explained to the patient and consent was obtained. The risks, benefits and alternatives to therapy were discussed in detail. Specifically, the risks of damage to the spinal accessory nerve, infection, scarring, bleeding, prolonged wound healing, incomplete removal, allergy to anesthesia, and recurrence were addressed. Prior to the procedure, the treatment site was clearly identified and confirmed by the patient. All components of Universal Protocol/PAUSE Rule completed.

## 2021-03-09 NOTE — TELEPHONE ENCOUNTER
Left message and sent [employer] message to patient regarding:  Tacrolimus = 6.6  (3/5/21)  Goal 3-5, d/t EBV - last EBV checked 2018, will update  Current  Dose Envarsus 1 mg daily     Previous level at goal on same dose        PLAN:   Call Nena Underwood and confirm this was a good 24-hour trough. Verify dose 1 mg daily.   Confirm no new medications or illness (janice. Diarrhea).   If good trough, stay on the same dose.   Recheck level in 2 weeks and make sure it is a good trough to avoid additional lab draws.  Please add an EBV PCR quant with the Tacrolimus level.

## 2021-03-09 NOTE — LETTER
PHYSICIAN ORDERS      DATE & TIME ISSUED: 2021 2:06 PM  PATIENT NAME: Nena Underwood   : 1975     [facility] MR# [if applicable]: [id number]     DIAGNOSIS:  Kidney Transplant  ICD-10 CODE: Z94.0     Please complete the following labs in 2 weeks:  Tacrolimus drug level  EBV PCR QT  CBC  BMP    Any questions please call: [phone]  Please fax lab results to [phone].    .

## 2021-05-07 ENCOUNTER — VIRTUAL VISIT (OUTPATIENT)
Dept: FAMILY MEDICINE | Facility: CLINIC | Age: 46
End: 2021-05-07
Payer: COMMERCIAL

## 2021-05-07 DIAGNOSIS — E21.3 HYPERPARATHYROIDISM (H): ICD-10-CM

## 2021-05-07 DIAGNOSIS — I42.9 SECONDARY CARDIOMYOPATHY (H): ICD-10-CM

## 2021-05-07 DIAGNOSIS — J01.90 ACUTE SINUSITIS WITH SYMPTOMS > 10 DAYS: Primary | ICD-10-CM

## 2021-05-07 DIAGNOSIS — D84.9 IMMUNOSUPPRESSED STATUS (H): ICD-10-CM

## 2021-05-07 PROCEDURE — 99213 OFFICE O/P EST LOW 20 MIN: CPT | Mod: 95 | Performed by: FAMILY MEDICINE

## 2021-05-07 RX ORDER — AMOXICILLIN 500 MG/1
500 CAPSULE ORAL 3 TIMES DAILY
Qty: 21 CAPSULE | Refills: 0 | Status: SHIPPED | OUTPATIENT
Start: 2021-05-07 | End: 2022-03-29

## 2021-05-07 NOTE — PATIENT INSTRUCTIONS
Please start taking the prescribed antibiotics and continue working on conservative management for symptomatic control.  Plus, consider checking COVID-19 PCR at the local urgent care to make sure if you had any unexpected exposure related with the clinical symptom.    Thanks,

## 2021-05-07 NOTE — PROGRESS NOTES
Nena is a 46 year old who is being evaluated via a billable telephone visit.      What phone number would you like to be contacted at? [phone]  How would you like to obtain your AVS? MyChart    Assessment & Plan     Acute sinusitis with symptoms > 10 days  She has been having sinus sx of 2 weeks, fortunately has no fever nor cough  Has been working on day care school, she has underlying health condition of renal transplants   She has worsening purulent postnasal drainage with sinus tenderness and headache  Will have her to start abx, also encouraged her to contact local urgent care to test COVID PCR based on her work environment of potential to be exposed  - amoxicillin (AMOXIL) 500 MG capsule; Take 1 capsule (500 mg) by mouth 3 times daily             FUTURE APPOINTMENTS:       - Follow-up visit in 6 months for CPE    No follow-ups on file.    Jorge L Tucker MD  [facility]    Janusz Barrett is a 46 year old who presents for the following health issues     HPI     Acute Illness  Acute illness concerns: cold symptoms  Onset/Duration: 5 days  Symptoms:  Fever: no  Chills/Sweats: no  Headache (location?): YES  Sinus Pressure: YES  Conjunctivitis:  no  Ear Pain: YES- when sneezing or blowing nose  Rhinorrhea: YES  Congestion: YES  Sore Throat: YES  Cough: no  Wheeze: no  Decreased Appetite: no  Nausea: YES  Vomiting: no  Diarrhea: no  Dysuria/Freq.: no  Dysuria or Hematuria: no  Fatigue/Achiness: no  Sick/Strep Exposure: no  Therapies tried and outcome: None      Review of Systems   Constitutional, HEENT, cardiovascular, pulmonary, gi and gu systems are negative, except as otherwise noted.      Objective           Vitals:  No vitals were obtained today due to virtual visit.    Physical Exam   healthy, alert and no distress  PSYCH: Alert and oriented times 3; coherent speech, normal   rate and volume, able to articulate logical thoughts, able   to abstract reason, no tangential  thoughts, no hallucinations   or delusions  Her affect is normal  RESP: No cough, no audible wheezing, able to talk in full sentences  Remainder of exam unable to be completed due to telephone visits                Phone call duration: 14 minutes

## 2021-06-01 DIAGNOSIS — Z94.0 KIDNEY REPLACED BY TRANSPLANT: ICD-10-CM

## 2021-06-01 DIAGNOSIS — Z94.0 KIDNEY TRANSPLANTED: Primary | ICD-10-CM

## 2021-06-01 RX ORDER — PREDNISONE 5 MG/1
5 TABLET ORAL DAILY
Qty: 90 TABLET | Refills: 3 | Status: SHIPPED | OUTPATIENT
Start: 2021-06-01 | End: 2021-09-23

## 2021-06-15 ENCOUNTER — MYC MEDICAL ADVICE (OUTPATIENT)
Dept: TRANSPLANT | Facility: CLINIC | Age: 46
End: 2021-06-15

## 2021-06-15 NOTE — TELEPHONE ENCOUNTER
ISSUE:  Tacrolimus 6.7, goal 4-6    PLAN:  Confirm a 24 hour trough and current dose of 1 mg daily   Any recent illness, diarrhea, or medication changes?  Prior level at goal.  Recommend repeating in 2 weeks.  If it remains elevated, will decrease dose    OUTCOME:  [employer] message sent.   Lab orders faxed.

## 2021-06-15 NOTE — LETTER
PHYSICIAN ORDERS      DATE & TIME ISSUED: Stephie 15, 2021 10:11 AM  PATIENT NAME: Nena Underwood   : 1975     [facility] MR# [if applicable]: [id number]     DIAGNOSIS:  kidney transplant  ICD-10 CODE: Z94.0    Please obtain the following labs in 2 weeks   24 hour tacrolimus level     Any questions please call: [phone]  Please fax results to [phone].    .

## 2021-06-15 NOTE — LETTER
Stephie 15, 2021      From: [facility]  Solid Organ Transplant Services      To Whom It May Concern:     Nena Underwood underwent a {Organ:[id number]}.  As such she currently fits into a  high risk  category for COVID-19.     For patients that work outside the home, we recommend that all reasonable accommodations be made to allow working remotely or modifying work responsibilities to protect your health.     If you have any questions or concerns about the above request, please contact the Solid Organ Transplant Office at [phone].      Thank you for your partnership.    Sincerely,   [facility]   Solid Organ Transplant Services

## 2021-06-16 NOTE — TELEPHONE ENCOUNTER
Nena responded via  [employer] confirming that this was a short trough.  She will plan to repeat a tac level in 1-2 weeks ensuring an accurate 24 hour level.   Lab orders faxed.

## 2021-09-23 DIAGNOSIS — Z94.0 KIDNEY TRANSPLANTED: Primary | ICD-10-CM

## 2021-09-23 DIAGNOSIS — Z94.0 KIDNEY REPLACED BY TRANSPLANT: ICD-10-CM

## 2021-09-23 DIAGNOSIS — Z94.0 KIDNEY REPLACED BY TRANSPLANT: Primary | ICD-10-CM

## 2021-09-23 RX ORDER — PREDNISONE 5 MG/1
5 TABLET ORAL DAILY
Qty: 90 TABLET | Refills: 3 | Status: SHIPPED | OUTPATIENT
Start: 2021-09-23 | End: 2022-03-29

## 2021-09-23 RX ORDER — AZATHIOPRINE 50 MG/1
50 TABLET ORAL DAILY
Qty: 90 TABLET | Refills: 3 | Status: SHIPPED | OUTPATIENT
Start: 2021-09-23 | End: 2022-03-29

## 2021-09-26 ENCOUNTER — HEALTH MAINTENANCE LETTER (OUTPATIENT)
Age: 46
End: 2021-09-26

## 2021-11-19 ENCOUNTER — TELEPHONE (OUTPATIENT)
Dept: TRANSPLANT | Facility: CLINIC | Age: 46
End: 2021-11-19
Payer: COMMERCIAL

## 2021-11-19 NOTE — TELEPHONE ENCOUNTER
November 19, 2021 8:14 AM - Varun John CNA: pt called to [facility] appt 1/17 jorge sanchez

## 2021-12-23 ENCOUNTER — OFFICE VISIT (OUTPATIENT)
Dept: FAMILY MEDICINE | Facility: CLINIC | Age: 46
End: 2021-12-23
Payer: COMMERCIAL

## 2021-12-23 VITALS
RESPIRATION RATE: 14 BRPM | OXYGEN SATURATION: 97 % | WEIGHT: 116 LBS | BODY MASS INDEX: 23.39 KG/M2 | DIASTOLIC BLOOD PRESSURE: 80 MMHG | HEART RATE: 67 BPM | HEIGHT: 59 IN | SYSTOLIC BLOOD PRESSURE: 118 MMHG | TEMPERATURE: 97.9 F

## 2021-12-23 DIAGNOSIS — D84.9 IMMUNOSUPPRESSED STATUS (H): ICD-10-CM

## 2021-12-23 DIAGNOSIS — I42.9 SECONDARY CARDIOMYOPATHY (H): ICD-10-CM

## 2021-12-23 DIAGNOSIS — Z12.31 ENCOUNTER FOR SCREENING MAMMOGRAM FOR BREAST CANCER: ICD-10-CM

## 2021-12-23 DIAGNOSIS — Z23 HIGH PRIORITY FOR 2019-NCOV VACCINE: ICD-10-CM

## 2021-12-23 DIAGNOSIS — E21.3 HYPERPARATHYROIDISM (H): ICD-10-CM

## 2021-12-23 DIAGNOSIS — Z00.00 HEALTHCARE MAINTENANCE: Primary | ICD-10-CM

## 2021-12-23 DIAGNOSIS — Z94.0 KIDNEY REPLACED BY TRANSPLANT: ICD-10-CM

## 2021-12-23 PROCEDURE — 0064A COVID-19,PF,MODERNA (18+ YRS BOOSTER .25ML): CPT | Performed by: FAMILY MEDICINE

## 2021-12-23 PROCEDURE — 36415 COLL VENOUS BLD VENIPUNCTURE: CPT | Performed by: FAMILY MEDICINE

## 2021-12-23 PROCEDURE — 99396 PREV VISIT EST AGE 40-64: CPT | Performed by: FAMILY MEDICINE

## 2021-12-23 PROCEDURE — 91306 COVID-19,PF,MODERNA (18+ YRS BOOSTER .25ML): CPT | Performed by: FAMILY MEDICINE

## 2021-12-23 PROCEDURE — 80061 LIPID PANEL: CPT | Performed by: FAMILY MEDICINE

## 2021-12-23 PROCEDURE — 80076 HEPATIC FUNCTION PANEL: CPT | Performed by: FAMILY MEDICINE

## 2021-12-23 ASSESSMENT — ENCOUNTER SYMPTOMS
ABDOMINAL PAIN: 0
WEAKNESS: 0
FREQUENCY: 0
DIARRHEA: 0
HEMATOCHEZIA: 0
FEVER: 0
HEMATURIA: 0
BREAST MASS: 0
DIZZINESS: 0
SORE THROAT: 0
MYALGIAS: 0
PARESTHESIAS: 0
ARTHRALGIAS: 0
DYSURIA: 0
EYE PAIN: 0
JOINT SWELLING: 0
HEADACHES: 0
HEARTBURN: 0
CONSTIPATION: 0
CHILLS: 0
SHORTNESS OF BREATH: 0
NAUSEA: 0
PALPITATIONS: 0
NERVOUS/ANXIOUS: 0
COUGH: 0

## 2021-12-23 ASSESSMENT — ACTIVITIES OF DAILY LIVING (ADL): CURRENT_FUNCTION: NO ASSISTANCE NEEDED

## 2021-12-23 ASSESSMENT — MIFFLIN-ST. JEOR: SCORE: 1061.8

## 2021-12-23 ASSESSMENT — PAIN SCALES - GENERAL: PAINLEVEL: NO PAIN (0)

## 2021-12-23 NOTE — PROGRESS NOTES
"   SUBJECTIVE:   CC: Nena Underwood is an 46 year old woman who presents for preventive health visit.       Patient has been advised of split billing requirements and indicates understanding: Yes  Healthy Habits:     In general, how would you rate your overall health?  Excellent    Frequency of exercise:  None    Do you usually eat at least 4 servings of fruit and vegetables a day, include whole grains    & fiber and avoid regularly eating high fat or \"junk\" foods?  Yes    Taking medications regularly:  Yes    Medication side effects:  None    Ability to successfully perform activities of daily living:  No assistance needed    Home Safety:  No safety concerns identified    Hearing Impairment:  No hearing concerns    In the past 6 months, have you been bothered by leaking of urine?  No    In general, how would you rate your overall mental or emotional health?  Excellent      PHQ-2 Total Score: 0    Additional concerns today:  No    questionnaire was relating to medicare. Patient is not on medicare at this time.         Today's PHQ-2 Score:   PHQ-2 ( 1999 [employer]) 5/7/2021   Q1: Little interest or pleasure in doing things 0   Q2: Feeling down, depressed or hopeless 0   PHQ-2 Score 0   PHQ-2 Total Score (12-17 Years)- Positive if 3 or more points; Administer PHQ-A if positive 0       Abuse: Current or Past (Physical, Sexual or Emotional) - No  Do you feel safe in your environment? Yes    Have you ever done Advance Care Planning? (For example, a Health Directive, POLST, or a discussion with a medical provider or your loved ones about your wishes): No, advance care planning information given to patient to review.  Patient declined advance care planning discussion at this time.    Social History     Tobacco Use     Smoking status: Never Smoker     Smokeless tobacco: Never Used   Substance Use Topics     Alcohol use: No     Alcohol/week: 0.0 standard drinks     If you drink alcohol do you typically have >3 drinks per day " or >7 drinks per week? No    Alcohol Use 2017   Prescreen: >3 drinks/day or >7 drinks/week? The patient does not drink >3 drinks per day nor >7 drinks per week.   No flowsheet data found.    Reviewed orders with patient.  Reviewed health maintenance and updated orders accordingly - Yes  BP Readings from Last 3 Encounters:   21 118/80   20 120/88   20 120/72    Wt Readings from Last 3 Encounters:   21 52.6 kg (116 lb)   20 50.3 kg (111 lb)   10/16/20 50.8 kg (112 lb)                  Patient Active Problem List   Diagnosis     Kidney replaced by transplant     Aftercare following organ transplant     CARDIOVASCULAR SCREENING; LDL GOAL LESS THAN 100     Immunosuppressed status (H)     Moraxella catarrhalis pneumonia (H)     Pyelonephritis     Cervical cancer screening     History of recurrent urinary tract infection     EBV (Jaime-Barr virus) viremia     Vitamin D deficiency     Secondary cardiomyopathy (H)     Hyperparathyroidism (H)     Past Surgical History:   Procedure Laterality Date      SECTION  2014    Procedure:  SECTION;;  Surgeon: Griselda Becerra MD;  Location: [facility]     HEMODIALYSIS VIA AV FISTULA      left arm     INSERT INTRAUTERINE DEVICE      Paraguard. removed      REMOVE INTRAUTERINE DEVICE  2017     TRANSPLANT KIDNEY RECIPIENT LIVING UNRELATED  6/23/10       Social History     Tobacco Use     Smoking status: Never Smoker     Smokeless tobacco: Never Used   Substance Use Topics     Alcohol use: No     Alcohol/week: 0.0 standard drinks     Family History   Problem Relation Age of Onset     Respiratory Paternal Grandmother         PGM had pulmonary TB at age 85, then  of old age at 89; she lived with [facility]     Eye Disorder Mother         retinal problems         Current Outpatient Medications   Medication Sig Dispense Refill     Acetaminophen (TYLENOL PO) Take 500 mg by mouth every 4 hours as needed for mild pain or  fever       amoxicillin (AMOXIL) 500 MG capsule Take 1 capsule (500 mg) by mouth 3 times daily 21 capsule 0     azaTHIOprine (IMURAN) 50 MG tablet Take 1 tablet (50 mg) by mouth daily 90 tablet 3     predniSONE (DELTASONE) 5 MG tablet Take 1 tablet (5 mg) by mouth daily 90 tablet 3     sulfamethoxazole-trimethoprim (BACTRIM) 400-80 MG tablet Take 1 tablet by mouth daily 90 tablet 3     tacrolimus (ENVARSUS XR) 1 MG 24 hr tablet Take 1 tablet (1 mg) by mouth every morning (before breakfast) 30 tablet 11     etonogestrel (IMPLANON/NEXPLANON) 68 MG IMPL 1 each (68 mg) by Subdermal route once for 1 dose 1 each 0     Allergies   Allergen Reactions     Nkda [No Known Drug Allergies]      Recent Labs   Lab Test 03/06/20  0909 12/13/19  0806 12/26/18  1142 08/31/18  0844 04/02/18  0909 03/17/18  0045 04/08/17  1036 03/30/17  1816 03/21/17  1820 03/20/17  2136 02/21/17  0043 02/14/17  0850   LDL  --  106*  --  89  --   --   --   --   --   --   --  50   HDL  --  54  --  63  --   --   --   --   --   --   --  53   TRIG  --  95  --  68  --   --   --   --   --   --   --  74   ALT  --   --   --   --   --  20  --  22  --  36  --   --    CR 0.91 1.14*   < > 0.81   < > 0.88   < > 1.32*   < > 1.22*   < >  --    GFRESTIMATED 75 57*   < > 76   < > 70   < > 44*   < > 48*   < >  --    GFRESTBLACK 87 66   < > >90   < > 84   < > 53*   < > 58*   < >  --    POTASSIUM 4.4 3.9   < > 4.0   < > 3.4   < > 4.0   < > 3.8   < >  --     < > = values in this interval not displayed.        Breast Cancer Screening:  Any new diagnosis of family breast, ovarian, or bowel cancer? Yes       FHS-7: No flowsheet data found.    Mammogram Screening: Recommended annual mammography  Pertinent mammograms are reviewed under the imaging tab.    History of abnormal Pap smear: need annual pain based on the history and underlying health issue   Pt wants to reschedule with female provider   PAP / HPV Latest Ref Rng & Units 11/6/2020 8/31/2018 2/2/2017   PAP (Historical)  - NIL NIL NIL   HPV16 NEG:Negative Negative Negative Negative   HPV18 NEG:Negative Negative Negative Negative   HRHPV NEG:Negative Negative Negative Negative     Reviewed and updated as needed this visit by clinical staff                Reviewed and updated as needed this visit by Provider               Past Medical History:   Diagnosis Date     Anemia      High risk medication use      Hyperparathyroidism, secondary renal (H)      Immunosuppressed status (H)      Kidney replaced by transplant 6/23/10    KIDNEY TRANSPLANT STATUS     Moraxella catarrhalis pneumonia (HCC) 3/28/2016     Pneumonia 2013    requiring hospitalization     Renal aplasia     since birth     Secondary cardiomyopathy (H) 2021     Single seizure (H)     felt secondary to uremia     TB (pulmonary tuberculosis)     treated with 4 drug regimen (INH, rifampin, ethambutol and pyrazinamide) for 6 months      Past Surgical History:   Procedure Laterality Date      SECTION  2014    Procedure:  SECTION;;  Surgeon: Griselda Becerra MD;  [facility]     HEMODIALYSIS VIA AV FISTULA      left arm     INSERT INTRAUTERINE DEVICE      Paraguard. removed      REMOVE INTRAUTERINE DEVICE  2017     TRANSPLANT KIDNEY RECIPIENT LIVING UNRELATED  6/23/10       Review of Systems   Constitutional: Negative for chills and fever.   HENT: Negative for congestion, ear pain, hearing loss and sore throat.    Eyes: Negative for pain and visual disturbance.   Respiratory: Negative for cough and shortness of breath.    Cardiovascular: Negative for chest pain, palpitations and peripheral edema.   Gastrointestinal: Negative for abdominal pain, constipation, diarrhea, heartburn, hematochezia and nausea.   Breasts:  Negative for tenderness, breast mass and discharge.   Genitourinary: Negative for dysuria, frequency, genital sores, hematuria, pelvic pain, urgency, vaginal bleeding and vaginal discharge.   Musculoskeletal:  "Negative for arthralgias, joint swelling and myalgias.   Skin: Negative for rash.   Neurological: Negative for dizziness, weakness, headaches and paresthesias.   Psychiatric/Behavioral: Negative for mood changes. The patient is not nervous/anxious.      CONSTITUTIONAL: NEGATIVE for fever, chills, change in weight  INTEGUMENTARU/SKIN: NEGATIVE for worrisome rashes, moles or lesions  EYES: NEGATIVE for vision changes or irritation  ENT: NEGATIVE for ear, mouth and throat problems  RESP: NEGATIVE for significant cough or SOB  BREAST: NEGATIVE for masses, tenderness or discharge  CV: NEGATIVE for chest pain, palpitations or peripheral edema  GI: NEGATIVE for nausea, abdominal pain, heartburn, or change in bowel habits  : NEGATIVE for unusual urinary or vaginal symptoms. Periods are regular.  MUSCULOSKELETAL: NEGATIVE for significant arthralgias or myalgia  NEURO: NEGATIVE for weakness, dizziness or paresthesias  PSYCHIATRIC: NEGATIVE for changes in mood or affect     OBJECTIVE:   /80   Pulse 67   Temp 97.9  F (36.6  C) (Tympanic)   Resp 14   Ht 1.499 m (4' 11\")   Wt 52.6 kg (116 lb)   SpO2 97%   BMI 23.43 kg/m    Physical Exam  GENERAL: healthy, alert and no distress  EYES: Eyes grossly normal to inspection, PERRL and conjunctivae and sclerae normal  HENT: ear canals and TM's normal, nose and mouth without ulcers or lesions  NECK: no adenopathy, no asymmetry, masses, or scars and thyroid normal to palpation  RESP: lungs clear to auscultation - no rales, rhonchi or wheezes  CV: regular rate and rhythm, normal S1 S2, no S3 or S4, no murmur, click or rub, no peripheral edema and peripheral pulses strong  ABDOMEN: soft, nontender, no hepatosplenomegaly, no masses and bowel sounds normal  MS: no gross musculoskeletal defects noted, no edema  SKIN: no suspicious lesions or rashes  NEURO: Normal strength and tone, mentation intact and speech normal  BACK: no CVA tenderness, no paralumbar tenderness  PSYCH: " "mentation appears normal, affect normal/bright  LYMPH: no cervical, supraclavicular, axillary, or inguinal adenopathy        ASSESSMENT/PLAN:       ICD-10-CM    1. Healthcare maintenance  Z00.00 MA SCREENING DIGITAL BILAT - Future  (s+30)     Hepatic panel (Albumin, ALT, AST, Bili, Alk Phos, TP)     Lipid panel reflex to direct LDL Fasting   2. Encounter for screening mammogram for breast cancer  Z12.31 MA SCREENING DIGITAL BILAT - Future  (s+30)   3. Immunosuppressed status (H)  D84.9    4. Secondary cardiomyopathy (H)  I42.9    5. Hyperparathyroidism (H)  E21.3    6. Kidney replaced by transplant  Z94.0        Patient has been advised of split billing requirements and indicates understanding: Yes  COUNSELING:  Reviewed preventive health counseling, as reflected in patient instructions    Estimated body mass index is 23.43 kg/m  as calculated from the following:    Height as of this encounter: 1.499 m (4' 11\").    Weight as of this encounter: 52.6 kg (116 lb).        She reports that she has never smoked. She has never used smokeless tobacco.      Counseling Resources:  ATP IV Guidelines  Pooled Cohorts Equation Calculator  Breast Cancer Risk Calculator  BRCA-Related Cancer Risk Assessment: FHS-7 Tool  FRAX Risk Assessment  ICSI Preventive Guidelines  Dietary Guidelines for Americans, 2010  USDA's MyPlate  ASA Prophylaxis  Lung CA Screening    Jorge L Tucker MD  [facility] [place]  "

## 2021-12-24 LAB
ALBUMIN SERPL-MCNC: 3.9 G/DL (ref 3.4–5)
ALP SERPL-CCNC: 82 U/L (ref 40–150)
ALT SERPL W P-5'-P-CCNC: 23 U/L (ref 0–50)
AST SERPL W P-5'-P-CCNC: 24 U/L (ref 0–45)
BILIRUB DIRECT SERPL-MCNC: 0.1 MG/DL (ref 0–0.2)
BILIRUB SERPL-MCNC: 0.4 MG/DL (ref 0.2–1.3)
CHOLEST SERPL-MCNC: 184 MG/DL
FASTING STATUS PATIENT QL REPORTED: YES
HDLC SERPL-MCNC: 54 MG/DL
LDLC SERPL CALC-MCNC: 106 MG/DL
NONHDLC SERPL-MCNC: 130 MG/DL
PROT SERPL-MCNC: 7.3 G/DL (ref 6.8–8.8)
TRIGL SERPL-MCNC: 119 MG/DL

## 2021-12-31 DIAGNOSIS — D84.9 IMMUNOSUPPRESSED STATUS (H): ICD-10-CM

## 2021-12-31 RX ORDER — SULFAMETHOXAZOLE AND TRIMETHOPRIM 400; 80 MG/1; MG/1
1 TABLET ORAL DAILY
Qty: 90 TABLET | Refills: 3 | Status: SHIPPED | OUTPATIENT
Start: 2021-12-31 | End: 2022-03-29

## 2022-01-15 ENCOUNTER — HEALTH MAINTENANCE LETTER (OUTPATIENT)
Age: 47
End: 2022-01-15

## 2022-03-12 ENCOUNTER — HEALTH MAINTENANCE LETTER (OUTPATIENT)
Age: 47
End: 2022-03-12

## 2022-03-29 DIAGNOSIS — Z94.0 KIDNEY TRANSPLANTED: Primary | ICD-10-CM

## 2022-03-29 DIAGNOSIS — Z94.0 KIDNEY REPLACED BY TRANSPLANT: ICD-10-CM

## 2022-03-29 RX ORDER — TACROLIMUS 1 MG/1
1 TABLET, EXTENDED RELEASE ORAL
Qty: 90 TABLET | Refills: 3 | Status: SHIPPED | OUTPATIENT
Start: 2022-03-29 | End: 2023-03-27

## 2022-06-21 ENCOUNTER — OFFICE VISIT (OUTPATIENT)
Dept: FAMILY MEDICINE | Facility: CLINIC | Age: 47
End: 2022-06-21
Payer: COMMERCIAL

## 2022-06-21 VITALS
HEART RATE: 66 BPM | SYSTOLIC BLOOD PRESSURE: 110 MMHG | BODY MASS INDEX: 22.82 KG/M2 | TEMPERATURE: 97.7 F | DIASTOLIC BLOOD PRESSURE: 60 MMHG | OXYGEN SATURATION: 97 % | WEIGHT: 113 LBS

## 2022-06-21 DIAGNOSIS — Z12.11 SCREEN FOR COLON CANCER: ICD-10-CM

## 2022-06-21 DIAGNOSIS — Z01.419 ENCOUNTER FOR ANNUAL ROUTINE GYNECOLOGICAL EXAMINATION: Primary | ICD-10-CM

## 2022-06-21 DIAGNOSIS — Z12.4 CERVICAL CANCER SCREENING: ICD-10-CM

## 2022-06-21 DIAGNOSIS — E55.9 VITAMIN D DEFICIENCY: ICD-10-CM

## 2022-06-21 PROCEDURE — 82306 VITAMIN D 25 HYDROXY: CPT | Performed by: FAMILY MEDICINE

## 2022-06-21 PROCEDURE — G0145 SCR C/V CYTO,THINLAYER,RESCR: HCPCS | Performed by: FAMILY MEDICINE

## 2022-06-21 PROCEDURE — 87624 HPV HI-RISK TYP POOLED RSLT: CPT | Performed by: FAMILY MEDICINE

## 2022-06-21 PROCEDURE — 36415 COLL VENOUS BLD VENIPUNCTURE: CPT | Performed by: FAMILY MEDICINE

## 2022-06-21 PROCEDURE — 99396 PREV VISIT EST AGE 40-64: CPT | Performed by: FAMILY MEDICINE

## 2022-06-21 ASSESSMENT — PAIN SCALES - GENERAL: PAINLEVEL: NO PAIN (0)

## 2022-06-21 NOTE — PROGRESS NOTES
Assessment & Plan     (Z01.419) Encounter for annual routine gynecological examination  (primary encounter diagnosis)  Comment:   Plan: Pap Screen with HPV - recommended age 30 - 65         years    (Z12.11) Screen for colon cancer  Comment:   Plan: Adult Gastro Ref - Procedure Only            (Z12.4) Cervical cancer screening  Comment:   Plan: Pap Screen with HPV - recommended age 30 - 65         years              Patient expressed understanding and agreement with treatment plan. All patient's questions were answered, will let me know if has more later.      Karrie Duval MD  [facility] [place]    Subjective   Nena is a 47 year old, presenting for the following health issues:  Gyn Exam      History of Present Illness       Reason for visit:  Papsmear, yearly check up    She eats 0-1 servings of fruits and vegetables daily.She consumes 0 sweetened beverage(s) daily.She exercises with enough effort to increase her heart rate 10 to 19 minutes per day.  She exercises with enough effort to increase her heart rate 4 days per week.   She is taking medications regularly.        had physical 12/2021 but did not do pap , so need pap/ pelvic exam.. Has been  on nexplanon , this is her second year but she thinks she always had some bleeding on that previously as well but now cycle is lighter  and soemwah infrequent at times so wondering if she could be going  through menopauase    also had low vitamin D in the past, wants to check the labs.  She is to restart his post renal transplant and doing quite well and being followed by her nephrologist.       Review of Systems   Constitutional, HEENT, cardiovascular, pulmonary, GI, , musculoskeletal, neuro, skin, endocrine and psych systems are negative, except as otherwise noted.      Objective    There were no vitals taken for this visit.  There is no height or weight on file to calculate BMI.  Physical Exam   GENERAL: healthy, alert and no  distress  EYES: Eyes grossly normal to inspection, PERRL and conjunctivae and sclerae normal  HENT: oropharynx clear and oral mucous membranes moist  NECK: no adenopathy, no asymmetry, masses, or scars and thyroid normal to palpation  RESP: lungs clear to auscultation - no rales, rhonchi or wheezes  BREAST: normal without masses, tenderness or nipple discharge and no palpable axillary masses or adenopathy  CV: regular rate and rhythm, normal S1 S2, no S3 or S4,   ABDOMEN: soft, nontender, no hepatosplenomegaly, no masses and bowel sounds normal  MS: tenderness to palpation   NEURO: Normal strength and tone, mentation intact and speech normal  PSYCH: mentation appears normal, affect normal/bright            .  ..

## 2022-06-22 LAB — DEPRECATED CALCIDIOL+CALCIFEROL SERPL-MC: 19 UG/L (ref 20–75)

## 2022-06-23 ENCOUNTER — OFFICE VISIT (OUTPATIENT)
Dept: FAMILY MEDICINE | Facility: CLINIC | Age: 47
End: 2022-06-23
Payer: COMMERCIAL

## 2022-06-23 VITALS — DIASTOLIC BLOOD PRESSURE: 82 MMHG | SYSTOLIC BLOOD PRESSURE: 112 MMHG

## 2022-06-23 DIAGNOSIS — Z48.298 AFTERCARE FOLLOWING ORGAN TRANSPLANT: ICD-10-CM

## 2022-06-23 PROCEDURE — 99213 OFFICE O/P EST LOW 20 MIN: CPT | Performed by: PHYSICIAN ASSISTANT

## 2022-06-23 NOTE — LETTER
6/23/2022         RE: Nena Underwood  [id number] [place] [address]        Dear Colleague,    Thank you for referring your patient, Nena Underwood, to the [facility]. Please see a copy of my visit note below.    [facility] Note  Encounter Date: Jun 23, 2022  Office Visit   ____________________________________________    Assessment & Plan:  1)  Benign nevi, Lentigines      I discussed the specifics of tumor, prognosis, and genetics of benign lesions.  I explained that treatment of these lesions would be purely cosmetic and not medically neccessary.  I discussed with patient different removal options including excision, cryotherapy, cautery and /or laser.  Lesion may recur and/or may not completely resolve. May need additional treatment.      2)kidney transplant patient  Signs and Symptoms of non-melanoma skin cancer and ABCDEs of melanoma reviewed with patient. Patient encouraged to perform monthly self skin exams and educated on how to perform them. UV precautions reviewed with patient. Patient was asked about new or changing moles/lesions on body.   Wear a sunscreen with at least SPF 30 on your face, ears, neck and V of the chest daily. Wear sunscreen on other areas of the body if those areas are exposed to the sun throughout the day. Sunscreens can contain physical and/or chemical blockers. Physical blockers are less likely to clog pores, these include zinc oxide and titanium dioxide. Reapply every two hour and after swimming. Sunscreen examples include Neutrogena, CeraVe, Blue Lizard, Elta MD and many others.    Reviewed pertinent charts and labs prior to office visit.       Follow-up: yearly [facility]BE      Provider Disclosure:   The documentation recorded by the scribe accurately reflects the services I personally performed and the decisions made by me.    Nasima Chandler, MS, PA-C      Scribe Disclosure:  I, Micaela Olmos, am serving as a scribe to document  services personally performed by Nasima Chandler PA-C based on data collection and the provider's statements to me.   ____________________________________________________    HPI:  Ms. Nena Underwood is a(n) 47 year old female who presents today as a return patient for a full body skin check s/p kidney transplant. She has moles on her body that have been present since childhood.  Patient reports the following symptoms: none .  Patient reports the following previous treatments: none.  Patient reports the following modifying factors: none.  Associated symptoms: none.  Patient has no other skin complaints today.  Remainder of the HPI, Meds, PMH, Allergies, FH, and SH was reviewed in chart.       Pertinent findings: Kidney transplant on prednisone, imuran, and tacrolimus    Medications:  Current Outpatient Medications   Medication     Acetaminophen (TYLENOL PO)     azaTHIOprine (IMURAN) 50 MG tablet     predniSONE (DELTASONE) 5 MG tablet     sulfamethoxazole-trimethoprim (BACTRIM) 400-80 MG tablet     tacrolimus (ENVARSUS XR) 1 MG 24 hr tablet     etonogestrel (IMPLANON/NEXPLANON) 68 MG IMPL     Current Facility-Administered Medications   Medication     etonogestrel (NEXPLANON) subdermal implant 68 mg        Past Medical History:   Patient Active Problem List   Diagnosis     Kidney replaced by transplant     Aftercare following organ transplant     CARDIOVASCULAR SCREENING; LDL GOAL LESS THAN 100     Immunosuppressed status (H)     Moraxella catarrhalis pneumonia (H)     Pyelonephritis     Cervical cancer screening     History of recurrent urinary tract infection     EBV (Jaime-Barr virus) viremia     Vitamin D deficiency     Secondary cardiomyopathy (H)     Hyperparathyroidism (H)     Past Medical History:   Diagnosis Date     Anemia      High risk medication use      Hyperparathyroidism, secondary renal (H)      Immunosuppressed status (H)      Kidney replaced by transplant 6/23/10    KIDNEY TRANSPLANT STATUS      Moraxella catarrhalis pneumonia (HCC) 3/28/2016     Pneumonia 2013    requiring hospitalization     Renal aplasia     since birth     Secondary cardiomyopathy (H) 2021     Single seizure (H)     felt secondary to uremia     TB (pulmonary tuberculosis) 2006    treated with 4 drug regimen (INH, rifampin, ethambutol and pyrazinamide) for 6 months       Past Surgical History:   Past Surgical History:   Procedure Laterality Date      SECTION  2014    Procedure:  SECTION;;  Surgeon: Griselda Becerra MD;  Location: [facility]     HEMODIALYSIS VIA AV FISTULA      left arm     INSERT INTRAUTERINE DEVICE      Paraguard. removed      REMOVE INTRAUTERINE DEVICE  2017     TRANSPLANT KIDNEY RECIPIENT LIVING UNRELATED  6/23/10       Family History:  Family History   Problem Relation Age of Onset     Respiratory Paternal Grandmother         PGRENATO had pulmonary TB at age 85, then  of old age at 89; she lived with [facility]     Eye Disorder Mother         retinal problems       Social History:  Social History     Tobacco Use     Smoking status: Never Smoker     Smokeless tobacco: Never Used   Substance Use Topics     Alcohol use: No     Alcohol/week: 0.0 standard drinks          Review Of Systems:  Skin: moles  Eyes: negative  Ears/Nose/Throat: negative  Respiratory: No shortness of breath, dyspnea on exertion, cough, or hemoptysis  Cardiovascular: negative  Gastrointestinal: negative  Genitourinary: negative  Musculoskeletal: negative  Neurologic: negative  Psychiatric: negative  Hematologic/Lymphatic/Immunologic: negative  Endocrine: negative      Objective:     /82   Eyes: Conjunctivae/lids: Normal   ENT: Lips:  Normal  MSK: Normal  Cardiovascular: Peripheral edema none  Pulm: Breathing Normal  Neuro/Psych: Orientation: Normal; Mood/Affect: Normal, NAD, WDWN  Pt accompanied by: self  Following areas examined: Scalp, face, eyelids, lips, neck, chest, abdomen, back,  buttocks, and R&L upper and lower extremities. Pt defers exam of groin and genitals.   Whitney skin type: iv   Findings:  Well circumscribed macules with symmetric color distribution on trunk and extremities.  Tan WD smooth macules on face, neck, trunk, and extremities.         CC Nasima Chandler PA-C  [address] [place] [address] on close of this encounter.      Again, thank you for allowing me to participate in the care of your patient.        Sincerely,        Nasima Chandler PA-C

## 2022-06-23 NOTE — PATIENT INSTRUCTIONS
Proper skin care from [facility]:    -Eliminate harsh soaps as they strip the natural oils from the skin, often resulting in dry itchy skin ( i.e. Dial, Zest, Ada Spring)  -Use mild soaps such as Cetaphil or Dove Sensitive Skin in the shower. You do not need to use soap on arms, legs, and trunk every time you shower unless visibly soiled.   -Avoid hot or cold showers.  -After showering, lightly dry off and apply moisturizing within 2-3 minutes. This will help trap moisture in the skin.   -Aggressive use of a moisturizer at least 1-2 times a day to the entire body (including -Vanicream, Cetaphil, Aquaphor or Cerave) and moisturize hands after every washing.  -We recommend using moisturizers that come in a tub that needs to be scooped out, not a pump. This has more of an oil base. It will hold moisture in your skin much better than a water base moisturizer. The above recommended are non-pore clogging.      Wear a sunscreen with at least SPF 30 on your face, ears, neck and V of the chest daily. Wear sunscreen on other areas of the body if those areas are exposed to the sun throughout the day. Sunscreens can contain physical and/or chemical blockers. Physical blockers are less likely to clog pores, these include zinc oxide and titanium dioxide. Reapply every two hour and after swimming.     Sunscreen examples: https://www.ewg.org/sunscreen/    UV radiation  UVA radiation remains constant throughout the day and throughout the year. It is a longer wavelength than UVB and therefore penetrates deeper into the skin leading to immediate and delayed tanning, photoaging, and skin cancer. 70-80% of UVA and UVB radiation occurs between the hours of 10am-2pm.  UVB radiation  UVB radiation causes the most harmful effects and is more significant during the summer months. However, snow and ice can reflect UVB radiation leading to skin damage during the winter months as well. UVB radiation is responsible for tanning,  burning, inflammation, delayed erythema (pinkness), pigmentation (brown spots), and skin cancer.     I recommend self monthly full body exams and yearly full body exams with a dermatology provider. If you develop a new or changing lesion please follow up for examination. Most skin cancers are pink and scaly or pink and pearly. However, we do see blue/brown/black skin cancers.  Consider the ABCDEs of melanoma when giving yourself your monthly full body exam ( don't forget the groin, buttocks, feet, toes, etc). A-asymmetry, B-borders, C-color, D-diameter, E-elevation or evolving. If you see any of these changes please follow up in clinic. If you cannot see your back I recommend purchasing a hand held mirror to use with a larger wall mirror.

## 2022-06-23 NOTE — PROGRESS NOTES
[facility] Note  Encounter Date: Jun 23, 2022  Office Visit   ____________________________________________    Assessment & Plan:  1)  Benign nevi, Lentigines      I discussed the specifics of tumor, prognosis, and genetics of benign lesions.  I explained that treatment of these lesions would be purely cosmetic and not medically neccessary.  I discussed with patient different removal options including excision, cryotherapy, cautery and /or laser.  Lesion may recur and/or may not completely resolve. May need additional treatment.      2)kidney transplant patient  Signs and Symptoms of non-melanoma skin cancer and ABCDEs of melanoma reviewed with patient. Patient encouraged to perform monthly self skin exams and educated on how to perform them. UV precautions reviewed with patient. Patient was asked about new or changing moles/lesions on body.   Wear a sunscreen with at least SPF 30 on your face, ears, neck and V of the chest daily. Wear sunscreen on other areas of the body if those areas are exposed to the sun throughout the day. Sunscreens can contain physical and/or chemical blockers. Physical blockers are less likely to clog pores, these include zinc oxide and titanium dioxide. Reapply every two hour and after swimming. Sunscreen examples include Neutrogena, CeraVe, Blue Lizard, Elta MD and many others.    Reviewed pertinent charts and labs prior to office visit.       Follow-up: yearly [facility]      Provider Disclosure:   The documentation recorded by the scribe accurately reflects the services I personally performed and the decisions made by me.    Nasima Chandler, MS, ADA      Scribe Disclosure:  I, Micaela Olmos, am serving as a scribe to document services personally performed by Nasima Chandler PA-C based on data collection and the provider's statements to me.   ____________________________________________________    HPI:  Ms. Nena Underwood is a(n) 47 year old female who presents today as a  return patient for a full body skin check s/p kidney transplant. She has moles on her body that have been present since childhood.  Patient reports the following symptoms: none .  Patient reports the following previous treatments: none.  Patient reports the following modifying factors: none.  Associated symptoms: none.  Patient has no other skin complaints today.  Remainder of the HPI, Meds, PMH, Allergies, FH, and SH was reviewed in chart.       Pertinent findings: Kidney transplant on prednisone, imuran, and tacrolimus    Medications:  Current Outpatient Medications   Medication     Acetaminophen (TYLENOL PO)     azaTHIOprine (IMURAN) 50 MG tablet     predniSONE (DELTASONE) 5 MG tablet     sulfamethoxazole-trimethoprim (BACTRIM) 400-80 MG tablet     tacrolimus (ENVARSUS XR) 1 MG 24 hr tablet     etonogestrel (IMPLANON/NEXPLANON) 68 MG IMPL     Current Facility-Administered Medications   Medication     etonogestrel (NEXPLANON) subdermal implant 68 mg        Past Medical History:   Patient Active Problem List   Diagnosis     Kidney replaced by transplant     Aftercare following organ transplant     CARDIOVASCULAR SCREENING; LDL GOAL LESS THAN 100     Immunosuppressed status (H)     Moraxella catarrhalis pneumonia (H)     Pyelonephritis     Cervical cancer screening     History of recurrent urinary tract infection     EBV (Jaime-Barr virus) viremia     Vitamin D deficiency     Secondary cardiomyopathy (H)     Hyperparathyroidism (H)     Past Medical History:   Diagnosis Date     Anemia      High risk medication use      Hyperparathyroidism, secondary renal (H)      Immunosuppressed status (H)      Kidney replaced by transplant 6/23/10    KIDNEY TRANSPLANT STATUS     Moraxella catarrhalis pneumonia (HCC) 3/28/2016     Pneumonia 2/2013    requiring hospitalization     Renal aplasia     since birth     Secondary cardiomyopathy (H) 5/7/2021     Single seizure (H) 2006    felt secondary to uremia     TB (pulmonary  tuberculosis) 2006    treated with 4 drug regimen (INH, rifampin, ethambutol and pyrazinamide) for 6 months       Past Surgical History:   Past Surgical History:   Procedure Laterality Date      SECTION  2014    Procedure:  SECTION;;  Surgeon: Griselda Becerra MD;  [facility]     HEMODIALYSIS VIA AV FISTULA      left arm     INSERT INTRAUTERINE DEVICE      Paraguard. removed      REMOVE INTRAUTERINE DEVICE  2017     TRANSPLANT KIDNEY RECIPIENT LIVING UNRELATED  6/23/10       Family History:  Family History   Problem Relation Age of Onset     Respiratory Paternal Grandmother         PGM had pulmonary TB at age 85, then  of old age at 89; she lived with Nena     Eye Disorder Mother         retinal problems       Social History:  Social History     Tobacco Use     Smoking status: Never Smoker     Smokeless tobacco: Never Used   Substance Use Topics     Alcohol use: No     Alcohol/week: 0.0 standard drinks          Review Of Systems:  Skin: moles  Eyes: negative  Ears/Nose/Throat: negative  Respiratory: No shortness of breath, dyspnea on exertion, cough, or hemoptysis  Cardiovascular: negative  Gastrointestinal: negative  Genitourinary: negative  Musculoskeletal: negative  Neurologic: negative  Psychiatric: negative  Hematologic/Lymphatic/Immunologic: negative  Endocrine: negative      Objective:     /82   Eyes: Conjunctivae/lids: Normal   ENT: Lips:  Normal  MSK: Normal  Cardiovascular: Peripheral edema none  Pulm: Breathing Normal  Neuro/Psych: Orientation: Normal; Mood/Affect: Normal, NAD, WDWN  Pt accompanied by: self  Following areas examined: Scalp, face, eyelids, lips, neck, chest, abdomen, back, buttocks, and R&L upper and lower extremities. Pt defers exam of groin and genitals.   Whitney skin type: iv   Findings:  Well circumscribed macules with symmetric color distribution on trunk and extremities.  Tan WD smooth macules on face, neck, trunk,  and extremities.         CC Nasima Chandler PA-C  [address] [place] [address] [place] [id number] on close of this encounter.

## 2022-06-26 LAB
BKR LAB AP GYN ADEQUACY: NORMAL
BKR LAB AP GYN INTERPRETATION: NORMAL
BKR LAB AP HPV REFLEX: NORMAL
BKR LAB AP PREVIOUS ABNORMAL: NORMAL
PATH REPORT.COMMENTS IMP SPEC: NORMAL
PATH REPORT.COMMENTS IMP SPEC: NORMAL
PATH REPORT.RELEVANT HX SPEC: NORMAL

## 2022-06-28 LAB
HUMAN PAPILLOMA VIRUS 16 DNA: NEGATIVE
HUMAN PAPILLOMA VIRUS 18 DNA: NEGATIVE
HUMAN PAPILLOMA VIRUS FINAL DIAGNOSIS: NORMAL
HUMAN PAPILLOMA VIRUS OTHER HR: NEGATIVE

## 2022-07-06 ENCOUNTER — TELEPHONE (OUTPATIENT)
Dept: FAMILY MEDICINE | Facility: CLINIC | Age: 47
End: 2022-07-06

## 2022-07-06 NOTE — TELEPHONE ENCOUNTER
Patient Contact    Attempt # 1    Was call answered?  No.  Left message on voicemail with information to call triage back at [phone], option 2.     On call back:   PCP message below.    Alysha NICOLE RN  EP Triage

## 2022-07-06 NOTE — TELEPHONE ENCOUNTER
"----- Message from Karrie Duval MD sent at 7/5/2022  7:57 PM CDT -----  I am not sure that could have been  bc she was on transplant list ?   may be she should check with her transplant doc,  if she still needs to stay on annual pap?   ----- Message -----  From: Nasima Salas RN  Sent: 6/29/2022  12:28 PM CDT  To: Karrie Duval MD    Helyoung Duval,    NIL Pap, Neg HPV in a 46 yo with history of organ transplant with immunosuppression. Ok to recommend cotest in 3 years per ASCCP guidelines for those with immunosuppression? Previously noted in the problem list, \"11/6/20 \"Per transplant team, she has been advised to have annual paps for surveillance due to immunosuppressed status.\"  Please advise.  Thanks!    Nasima Salas, RN  Pap Tracking         "

## 2022-07-06 NOTE — TELEPHONE ENCOUNTER
Spoke to patient and she is gong to discuss her pap every year with GYN.     Joselin Melissa RN  [facility]

## 2022-09-16 NOTE — TELEPHONE ENCOUNTER
Noted.  [facility] yr screening.  Lynette Nissen RN     [Negative] : Genitourinary

## 2022-10-07 DIAGNOSIS — Z94.0 KIDNEY REPLACED BY TRANSPLANT: ICD-10-CM

## 2022-10-07 DIAGNOSIS — Z94.0 KIDNEY TRANSPLANTED: ICD-10-CM

## 2022-10-07 RX ORDER — PREDNISONE 5 MG/1
5 TABLET ORAL DAILY
Qty: 90 TABLET | Refills: 3 | Status: SHIPPED | OUTPATIENT
Start: 2022-10-07 | End: 2023-10-10

## 2022-10-10 DIAGNOSIS — Z94.0 KIDNEY REPLACED BY TRANSPLANT: ICD-10-CM

## 2022-10-10 RX ORDER — AZATHIOPRINE 50 MG/1
50 TABLET ORAL DAILY
Qty: 90 TABLET | Refills: 3 | Status: SHIPPED | OUTPATIENT
Start: 2022-10-10 | End: 2023-10-10

## 2022-10-13 ENCOUNTER — TELEPHONE (OUTPATIENT)
Dept: TRANSPLANT | Facility: CLINIC | Age: 47
End: 2022-10-13

## 2022-10-13 NOTE — TELEPHONE ENCOUNTER
Tacrolimus = 7.7  (9/27/22)  Goal 3-5  Current Envarsus dose 1 mg daily    PLAN:   Call and confirm this was a good 24-hour trough.   Verify dose 1 mg daily.   Confirm no new medications or illness (janice. Diarrhea).  stay on the same dose.   Recheck level in 1-2 weeks and make sure it is a good trough to avoid additional lab draws.

## 2022-10-13 NOTE — LETTER
PHYSICIAN ORDERS      DATE & TIME ISSUED: 2022 9:39 AM  PATIENT NAME: Nena Underwood   : 1975     [facility] MR# [if applicable]: [id number]     DIAGNOSIS:  Kidney transplant   ICD-10 CODE: Z94.0      Please repeat the following level in 1-2 weeks  Tacrolimus level     Any questions please call: [phone]    Please fax results to [phone].    .

## 2022-10-18 NOTE — TELEPHONE ENCOUNTER
Call placed to patient. No answer. Detailed voice message left requesting a return call to discuss elevated tacrolimus level. Instructions left for patient to continue current dose and repeat level in 1-2 weeks. Order sent

## 2022-10-25 ENCOUNTER — TELEPHONE (OUTPATIENT)
Dept: GASTROENTEROLOGY | Facility: CLINIC | Age: 47
End: 2022-10-25

## 2022-10-25 NOTE — TELEPHONE ENCOUNTER
Screening Questions  BLUE  KIND OF PREP RED  LOCATION [review exclusion criteria] GREEN  SEDATION TYPE        Y Are you active on mychart?       [place] Ordering/Referring Provider?        [employer]BS What type of coverage do you have?      N Have you had a positive covid test in the last 90 days?        Date:    1. 22.2  BMI  [BMI 40+ - review exclusion criteria]  2. Y  Are you able to give consent for your medical care? [RN REVIEW?]        3. N  Are you taking any prescription pain medications on a routine schedule?        3a.  EXTENDED PREP What kind of prescription?   4. N Do you have any chemical dependencies such as alcohol, street drugs, or methadone?    5. N Do you have any history of post-traumatic stress syndrome, severe anxiety or history of psychosis?      **If yes 2- 5 , please schedule with MAC sedation.**    BMI OVER 40 NEED PAC EVALUATION FOR [facility]          IF YES TO ANY 6 - 10 - HOSPITAL SETTING ONLY.     6.   N Do you need assistance transferring?     7.   N Have you had a heart or lung transplant?  KIDNEY  8.   N Are you currently on dialysis?   9.   N Do you use daily home oxygen?   10. N Do you take nitroglycerin?   10a.  If yes, how often?     11. [FEMALES]  N Are you currently pregnant?    11a.  If yes, how many weeks? [ Greater than 12 weeks, OR NEEDED]    12. N Do you have Pulmonary Hypertension? *NEED PAC APPT AT [facility]*     13. N [review exclusion criteria]  Do you have any implantable devices in your body (pacemaker, defib, LVAD)?    14. N In the past 6 months, have you had any heart related issues including cardiomyopathy or heart attack?     14a.  If yes, did it require cardiac stenting if so when?     15. N Have you had a stroke or Transient ischemic attack (TIA - aka  mini stroke ) within 6 months?      16. N Do you have mod to severe Obstructive Sleep Apnea?  [Hospital only - [place] at [place]]    17. N Do you have SEVERE AND UNCONTROLLED asthma?     18. N Are you currently taking any blood  "thinners?     18a. If yes, inform patient to \"follow up w/ ordering provider for bridging instructions.\"    19. N Do you take the medication Phentermine?    19a. If yes, \"Hold for 7 days before procedure.  Please consult your prescribing provider if you have questions about holding this medication.\"     20. N  Do you have chronic kidney disease? HAS ONE TRANSPLANTED KIDNEY      21. N  Do you have a diagnosis of diabetes?     22. N  On a regular basis do you go 3-5 days between bowel movements?     23.  Preferred LOCAL Pharmacy for Pre Prescription    [ LIST ONLY ONE PHARMACY]    [facility] [id number] [place] [address]      - CLOSING REMINDERS -    Informed patient they will need an adult    Cannot take any type of public or medical transportation alone    Conscious Sedation- Needs  for 6 hours after the procedure       MAC/General-Needs  for 24 hours after procedure    Pre-Procedure Covid test to be completed [[facility] PCR Testing Required]    Confirmed Nurse will call to complete pre-assessment       - SCHEDULING DETAILS -     TABITHA  Surgeon    12/28  Date of Procedure  Lower Endoscopy [Colonoscopy]  Type of Procedure Scheduled   [facility] PREP-If you answer yes to questions #8, #20, #21Which Colonoscopy Prep was Sent?     CS Sedation Type     N PAC / Pre-op Required         Additional comments:        "

## 2022-12-27 RX ORDER — ONDANSETRON 2 MG/ML
4 INJECTION INTRAMUSCULAR; INTRAVENOUS
Status: CANCELLED | OUTPATIENT
Start: 2022-12-27

## 2022-12-27 RX ORDER — LIDOCAINE 40 MG/G
CREAM TOPICAL
Status: CANCELLED | OUTPATIENT
Start: 2022-12-27

## 2022-12-28 ENCOUNTER — HOSPITAL ENCOUNTER (OUTPATIENT)
Facility: CLINIC | Age: 47
Discharge: HOME | End: 2022-12-28
Attending: COLON & RECTAL SURGERY | Admitting: COLON & RECTAL SURGERY
Payer: COMMERCIAL

## 2022-12-28 VITALS
HEART RATE: 70 BPM | RESPIRATION RATE: 58 BRPM | SYSTOLIC BLOOD PRESSURE: 116 MMHG | BODY MASS INDEX: 23.18 KG/M2 | HEIGHT: 59 IN | OXYGEN SATURATION: 99 % | DIASTOLIC BLOOD PRESSURE: 71 MMHG | WEIGHT: 115 LBS

## 2022-12-28 DIAGNOSIS — Z12.11 ENCOUNTER FOR SCREENING COLONOSCOPY: Primary | ICD-10-CM

## 2022-12-28 LAB — COLONOSCOPY: NORMAL

## 2022-12-28 PROCEDURE — G0121 COLON CA SCRN NOT HI RSK IND: HCPCS | Performed by: COLON & RECTAL SURGERY

## 2022-12-28 PROCEDURE — 45378 DIAGNOSTIC COLONOSCOPY: CPT | Performed by: COLON & RECTAL SURGERY

## 2022-12-28 PROCEDURE — 250N000011 HC RX IP 250 OP 636: Performed by: COLON & RECTAL SURGERY

## 2022-12-28 PROCEDURE — G0500 MOD SEDAT ENDO SERVICE >5YRS: HCPCS | Performed by: COLON & RECTAL SURGERY

## 2022-12-28 RX ORDER — FENTANYL CITRATE 50 UG/ML
INJECTION, SOLUTION INTRAMUSCULAR; INTRAVENOUS PRN
Status: DISCONTINUED | OUTPATIENT
Start: 2022-12-28 | End: 2022-12-28 | Stop reason: HOSPADM

## 2022-12-28 ASSESSMENT — ACTIVITIES OF DAILY LIVING (ADL): ADLS_ACUITY_SCORE: 37

## 2022-12-28 NOTE — H&P
Colon & Rectal Surgery History and Physical  Pre-Endoscopy Procedure Note    History of Present Illness   I have been asked by Dr. Tucker to evaluate this 47 year old female for colorectal cancer screening. She currently denies any abdominal pain, weight loss, bleeding per rectum, or recent change in bowel habits.    Past Medical History  Diagnosis Date     Anemia      High risk medication use      Hyperparathyroidism, secondary renal      Immunosuppressed status       Kidney replaced by transplant 6/23/10     Moraxella catarrhalis pneumonia (HCC) 3/28/2016     Pneumonia 2013    requiring hospitalization     Renal aplasia     since birth     Secondary cardiomyopathy  2021     Single seizure     felt secondary to uremia     TB (pulmonary tuberculosis)     treated with 4 drug regimen (INH, rifampin, ethambutol and pyrazinamide) for 6 months       Past Surgical History  Procedure Laterality Date      SECTION  2014    Procedure:  SECTION;;  Surgeon: Griselda Becerra MD;  [facility]     HEMODIALYSIS VIA AV FISTULA      left arm     INSERT INTRAUTERINE DEVICE      Paraguard. removed      REMOVE INTRAUTERINE DEVICE  2017     TRANSPLANT KIDNEY RECIPIENT LIVING UNRELATED  6/23/10        Medications  Medication Sig     azaTHIOprine (IMURAN) 50 MG tablet Take 1 tablet (50 mg) by mouth daily     predniSONE (DELTASONE) 5 MG tablet TAKE 1 TABLET (5 MG) BY MOUTH DAILY     sulfamethoxazole-trimethoprim (BACTRIM) 400-80 MG tablet Take 1 tablet by mouth daily     tacrolimus (ENVARSUS XR) 1 MG 24 hr tablet Take 1 tablet (1 mg) by mouth every morning (before breakfast)     Acetaminophen (TYLENOL PO) Take 500 mg by mouth every 4 hours as needed for mild pain or fever     etonogestrel (IMPLANON/NEXPLANON) 68 MG IMPL 1 each (68 mg) by Subdermal route once for 1 dose       Allergies  Allergen Reactions     NKDA [No Known Drug Allergies]         Family History   Family history  "includes Eye Disorder in her mother; Respiratory Disorder in her paternal grandmother.     Social History   She reports that she has never smoked. She has never used smokeless tobacco. She reports that she does not drink alcohol and does not use drugs.    Review of Systems   Constitutional:  No fever, weight change or fatigue.    Eyes:     No dry eyes or vision changes.   Ears/Nose/Throat/Neck:  No oral ulcers, sore throat or voice change.    Cardiovascular:   No palpitations, syncope, angina or edema.   Respiratory:    No chest pain, excessive sleepiness, shortness of breath or hemoptysis.    Gastrointestinal:   No abdominal pain, nausea, vomiting, diarrhea or heartburn.    Genitourinary:   No dysuria, hematuria, urinary retention or urinary frequency.   Musculoskeletal:  No joint swelling or arthralgias.    Dermatologic:  No skin rash or other skin changes.   Neurologic:    No focal weakness or numbness. No neuropathy.   Psychiatric:    No depression, anxiety, suicidal ideation, or paranoid ideation.   Endocrine:   No cold or heat intolerance, polydipsia, hirsutism, change in libido, or flushing.   Hematology/Lymphatic:  No bleeding or lymphadenopathy.    Allergy/Immunology:  No rhinitis or hives.     Physical Exam   Vitals:  Blood pressure 133/80, pulse 74, resp. rate 18, height 1.499 m (4' 11\"), weight 52.2 kg (115 lb), SpO2 98 %, not currently breastfeeding.    General:  Alert and oriented to person, place and time   Airway: Normal oropharyngeal airway and neck mobility   Lungs:  Clear bilaterally   Heart:  Regular rate and rhythm   Abdomen: Soft, NT, ND, no masses   Extremities: Warm, good capillary refill    ASA Grade: II (mild systemic disease)    Impression: Cleared for use of conscious sedation for colorectal cancer screening    Plan: Proceed with colonoscopy     Gianna Santamaria MD  [facility]  [phone]  "

## 2023-01-05 DIAGNOSIS — D84.9 IMMUNOSUPPRESSED STATUS (H): ICD-10-CM

## 2023-01-06 RX ORDER — SULFAMETHOXAZOLE AND TRIMETHOPRIM 400; 80 MG/1; MG/1
1 TABLET ORAL DAILY
Qty: 90 TABLET | Refills: 3 | Status: SHIPPED | OUTPATIENT
Start: 2023-01-06 | End: 2024-01-08

## 2023-01-11 ENCOUNTER — NURSE TRIAGE (OUTPATIENT)
Dept: NURSING | Facility: CLINIC | Age: 48
End: 2023-01-11

## 2023-01-11 NOTE — TELEPHONE ENCOUNTER
Telephone call:    Patient is calling in to inquire about contraceptive implant in arm.  She reports turning 48 years of age soon & being premenopausal & is wondering if she will still need the contraceptive implant in her arm.  The patient reports the implant will  this coming March as it has been 3 years.    The patient states if she is still at risk for pregnancy, she would like to request to have a new contraceptive implant implanted the same day as her appointment w/ her PCP on 2023.    Please review & advise patient w/ further recommendations.    Arpita Lopez RN on 2023 at 9:42 AM    Reason for Disposition    Information only question and nurse able to answer    Protocols used: INFORMATION ONLY CALL - NO TRIAGE-A-OH

## 2023-01-11 NOTE — TELEPHONE ENCOUNTER
Spoke to patient and she wanted to ask Dr. Tucker if she should have another implanon/nexolanon  inserted before March?    She has a physical with you 3/27/2023.     Joselin Melissa RN  -[facility]   .

## 2023-01-11 NOTE — TELEPHONE ENCOUNTER
Patient Contact    Attempt # 1    Was call answered?  No.  Left message on voicemail with information to call triage back at [phone], option 2.     On call back:     PCP message below.    Alysha NICOLE RN  EP Triage

## 2023-01-11 NOTE — TELEPHONE ENCOUNTER
Left message for patient to call back and discuss contraception.  We need to check with PCP to see if her request should be sent to GYN for consult.     Joselin Melissa RN  [facility]

## 2023-01-11 NOTE — TELEPHONE ENCOUNTER
Her last nexplanon is placed  and is  per our records. Please check on with her if she has another one after that. If not, she should do back up contraception for now. And, she should plan different methods to substitute nexplanon for at least for next 3-5 years. We should discuss about the option including Depo provera injection. I wouldn't delay the discussion til CPE at March

## 2023-01-12 NOTE — TELEPHONE ENCOUNTER
Patient is mainly wondering since she will be turning 48 next month if it is safe to get another Nexplanon placed? --->yes       If she gets another placed would it affect her hormones?---> minimal

## 2023-01-12 NOTE — TELEPHONE ENCOUNTER
Lew attempt #2.     Many discussions , please read all notes and give patient information.     Joselin Melissa RN  [facility]

## 2023-01-12 NOTE — TELEPHONE ENCOUNTER
Received a call back from the patient. Relayed message from the provider. Patient states she did have a Nexplanon placed in  by PA at [facility] and it will  around March 15, 2023.     Patient is mainly wondering since she will be turning 48 next month if it is safe to get another Nexplanon placed? If she gets another placed would it affect her hormones? Patient states she is still having her period (not consistently) and she is still concerned about getting pregnant.    Patient states if it is safe to get another paced she will schedule an appointment prior to her appointment in March to get another placed.     Will route to PCP for review.     Can we leave a detailed message on this number? YES  Phone number patient can be reached at: Cell number on file:    Telephone Information:   Mobile [phone]       Jessica Barry RN  [facility] Triage

## 2023-01-13 NOTE — TELEPHONE ENCOUNTER
Spoke to patient. She did have the Nexplanon removed and re inserted on   3/8/2020. This was done by Brad Werner.      Patient would like to have the implant removed and a new Nexplanon inserted.     Can you do this at her 3/27/2023 visit or do you require two separate visits?     Joselin Melissa RN  -[facility]    visits?

## 2023-01-16 ENCOUNTER — OFFICE VISIT (OUTPATIENT)
Dept: FAMILY MEDICINE | Facility: CLINIC | Age: 48
End: 2023-01-16
Payer: COMMERCIAL

## 2023-01-16 VITALS
SYSTOLIC BLOOD PRESSURE: 118 MMHG | WEIGHT: 115.2 LBS | RESPIRATION RATE: 16 BRPM | TEMPERATURE: 98.9 F | OXYGEN SATURATION: 99 % | HEART RATE: 86 BPM | HEIGHT: 59 IN | BODY MASS INDEX: 23.22 KG/M2 | DIASTOLIC BLOOD PRESSURE: 74 MMHG

## 2023-01-16 DIAGNOSIS — Z94.0 STATUS POST KIDNEY TRANSPLANT: ICD-10-CM

## 2023-01-16 DIAGNOSIS — H25.013 CORTICAL AGE-RELATED CATARACT OF BOTH EYES: ICD-10-CM

## 2023-01-16 DIAGNOSIS — D84.9 IMMUNOSUPPRESSED STATUS (H): ICD-10-CM

## 2023-01-16 DIAGNOSIS — Z01.818 PREOP GENERAL PHYSICAL EXAM: Primary | ICD-10-CM

## 2023-01-16 PROCEDURE — 99214 OFFICE O/P EST MOD 30 MIN: CPT | Performed by: FAMILY MEDICINE

## 2023-01-16 ASSESSMENT — PAIN SCALES - GENERAL: PAINLEVEL: NO PAIN (0)

## 2023-01-16 NOTE — PROGRESS NOTES
[address] [address]  Phone: [phone]  Primary Provider: Jorge L Tucker  Pre-op Performing Provider: LON FORTE      PREOPERATIVE EVALUATION:  Today's date: 1/16/2023    Nena Underwood is a 47 year old female who presents for a preoperative evaluation.    Surgical Information:  Surgery/Procedure: Cataract   Surgery Location: [facility]   Surgeon: Dr. Hamlet Darnell   Surgery Date: 02/02/2023 and 02/16/2023  Where patient plans to recover: At home with family  Fax number for surgical facility: [phone]    Type of Anesthesia Anticipated: Local with MAC    Assessment & Plan     The proposed surgical procedure is considered LOW risk.      ICD-10-CM    1. Preop general physical exam  Z01.818       2. Cortical age-related cataract of both eyes  H25.013       3. Immunosuppressed status (H)  D84.9       4. Status post kidney transplant  Z94.0             Risks and Recommendations:  The patient has the following additional risks and recommendations for perioperative complications:  Patient is immunosuppressed due to the use of medications for kidney transplant.  No medication needs to be stopped perioperatively.  Resume all medications as before.        RECOMMENDATION:  APPROVAL GIVEN to proceed with proposed procedure, without further diagnostic evaluation.            Subjective     HPI related to upcoming procedure:     Preop Questions 1/16/2023   1. Have you ever had a heart attack or stroke? No   2. Have you ever had surgery on your heart or blood vessels, such as a stent placement, a coronary artery bypass, or surgery on an artery in your head, neck, heart, or legs? No   3. Do you have chest pain with activity? No   4. Do you have a history of  heart failure? No   5. Do you currently have a cold, bronchitis or symptoms of other infection? No   6. Do you have a cough, shortness of breath, or wheezing? No   7. Do you or anyone in your  "family have previous history of blood clots? No   8. Do you or does anyone in your family have a serious bleeding problem such as prolonged bleeding following surgeries or cuts? No   9. Have you ever had problems with anemia or been told to take iron pills? No   10. Have you had any abnormal blood loss such as black, tarry or bloody stools, or abnormal vaginal bleeding? No   11. Have you ever had a blood transfusion? UNKNOWN -    12. Are you willing to have a blood transfusion if it is medically needed before, during, or after your surgery? Yes   13. Have you or any of your relatives ever had problems with anesthesia? UNKNOWN -    14. Do you have sleep apnea, excessive snoring or daytime drowsiness? No   15. Do you have any artifical heart valves or other implanted medical devices like a pacemaker, defibrillator, or continuous glucose monitor? No   16. Do you have artificial joints? No   17. Are you allergic to latex? No   18. Is there any chance that you may be pregnant? No       Health Care Directive:  Patient does not have a Health Care Directive or Living Will:     Preoperative Review of :            Review of Systems  CONSTITUTIONAL: NEGATIVE for fever, chills, change in weight  ENT/MOUTH: NEGATIVE for ear, mouth and throat problems  RESP: NEGATIVE for significant cough or SOB  CV: NEGATIVE for chest pain, palpitations or peripheral edema    Patient Active Problem List    Diagnosis Date Noted     Status post kidney transplant 07/23/2010     Priority: High     Secondary cardiomyopathy (H) 05/07/2021     Priority: Medium     Hyperparathyroidism (H) 05/07/2021     Priority: Medium     Vitamin D deficiency 07/05/2018     Priority: Medium     EBV (Jaime-Barr virus) viremia 06/20/2017     Priority: Medium     History of recurrent urinary tract infection 05/03/2017     Priority: Medium     Cervical cancer screening 02/09/2017     Priority: Medium     Hx of kidney transplant, immunosuppressed status  11/6/20 \"Per " "transplant team, she has been advised to have annual paps for surveillance due to immunosuppressed status.\"  6/21/22 NIL Pap, Neg HPV  7/6/22 pt states she is going to talk with her GYN doctor every year to determine when she needs her next pap.     \"Although the literature for other immunosuppressed populations remains limited, these other conditions that suppress cell-mediated immunity have also been associated with virally induced cancers, including cervical cancer. Therefore, cervical cancer screening and abnormal result management recommendations for immunocompromised individuals without HIV use the guidelines developed for people living with HIV: screening should begin within 1 year of first insertional sexual activity and continue throughout a patient's lifetime: annually for 3 years, then every 3 years (cytology only) until the age of 30 years, and then either continuing with cytology alone or cotesting every 3 years after the age of 30 years.\"  2019 ASCCP guideline recommendations (Journal of Lower Genital Track Disease, Vol.24, Number 2, April 2020, p. 124)           Pyelonephritis 02/04/2017     Priority: Medium     Moraxella catarrhalis pneumonia (H) 03/28/2016     Priority: Medium     Immunosuppressed status (H)      Priority: Medium     CARDIOVASCULAR SCREENING; LDL GOAL LESS THAN 100 10/31/2010     Priority: Medium     Aftercare following organ transplant 07/23/2010     Priority: Low      Past Medical History:   Diagnosis Date     Anemia      High risk medication use      Hyperparathyroidism, secondary renal (H)      Immunosuppressed status (H)      Kidney replaced by transplant 6/23/10    KIDNEY TRANSPLANT STATUS     Moraxella catarrhalis pneumonia (HCC) 3/28/2016     Pneumonia 2/2013    requiring hospitalization     Renal aplasia     since birth     Secondary cardiomyopathy (H) 5/7/2021     Single seizure (H) 2006    felt secondary to uremia     TB (pulmonary tuberculosis) 2006    treated with 4 " "drug regimen (INH, rifampin, ethambutol and pyrazinamide) for 6 months     Past Surgical History:   Procedure Laterality Date      SECTION  2014    Procedure:  SECTION;;  Surgeon: Griselda Becerra MD;  [facility]     COLONOSCOPY N/A 2022    Procedure: COLONOSCOPY;  Surgeon: Gianna Santamaria MD;  Location:  GI     HEMODIALYSIS VIA AV FISTULA      left arm     INSERT INTRAUTERINE DEVICE      Paraguard. removed      REMOVE INTRAUTERINE DEVICE  2017     TRANSPLANT KIDNEY RECIPIENT LIVING UNRELATED  6/23/10     Current Outpatient Medications   Medication Sig Dispense Refill     Acetaminophen (TYLENOL PO) Take 500 mg by mouth every 4 hours as needed for mild pain or fever       azaTHIOprine (IMURAN) 50 MG tablet Take 1 tablet (50 mg) by mouth daily 90 tablet 3     etonogestrel (IMPLANON/NEXPLANON) 68 MG IMPL 1 each (68 mg) by Subdermal route once for 1 dose 1 each 0     predniSONE (DELTASONE) 5 MG tablet TAKE 1 TABLET (5 MG) BY MOUTH DAILY 90 tablet 3     sulfamethoxazole-trimethoprim (BACTRIM) 400-80 MG tablet Take 1 tablet by mouth daily 90 tablet 3     tacrolimus (ENVARSUS XR) 1 MG 24 hr tablet Take 1 tablet (1 mg) by mouth every morning (before breakfast) 90 tablet 3     Allergies   Allergen Reactions     Nkda [No Known Drug Allergies]         Social History     Tobacco Use     Smoking status: Never     Smokeless tobacco: Never   Substance Use Topics     Alcohol use: No     Alcohol/week: 0.0 standard drinks       History   Drug Use No         Objective     /74   Pulse 86   Temp 98.9  F (37.2  C) (Tympanic)   Resp 16   Ht 1.499 m (4' 11\")   Wt 52.3 kg (115 lb 3.2 oz)   LMP 2022 (Approximate)   SpO2 99%   BMI 23.27 kg/m      Physical Exam  GENERAL APPEARANCE: healthy, alert and no distress  HENT: ear canals and TM's normal and nose and mouth without ulcers or lesions  RESP: lungs clear to auscultation - no rales, rhonchi or wheezes  CV: " regular rate and rhythm, normal S1 S2, no S3 or S4 and no murmur, click or rub   ABDOMEN: soft, nontender, no HSM or masses and bowel sounds normal  NEURO: Normal strength and tone, sensory exam grossly normal, mentation intact and speech normal      Diagnostics:    1/13/23     WBC 4.0 - 11.0 X10*3u/L 5.2    RBC 3.80 - 5.20 X10*6/uL 4.15    HGB 12.0 - 16.0 g/dl 14.2    HCT 35.0 - 47.0 % 40.5    MCV 80 - 98 fL 97.6    MCH 27.0 - 34.0 pg 34.2 High     MCHC 32 - 36 g/dl 35.1     - 420 X10*3/uL 268    RDW-CV 11.6 - 14.4 % 11.6    RDW-SD 36.5 - 46.3 fL 41.8      1/13/23     Sodium 136 - 145 mmol/L 139    Potassium 3.5 - 5.1 mmol/L 4.6    Chloride 98 - 107 mmol/L 105    Carbon Dioxide 21 - 32 mmol/L 27    Calcium 8.5 - 10.1 mg/dL 9.6    Glucose 74 - 100 mg/dL 115 High     Blood Urea nitrogen 7 - 18 mg/dL 23 High     Creatinine 0.51 - 0.95 mg/dL 1.01 High     Glomerular Filtration Rate >=60 mL/min/1.73 m*2 >60          Revised Cardiac Risk Index (RCRI):  The patient has the following serious cardiovascular risks for perioperative complications:   - No serious cardiac risks = 0 points     RCRI Interpretation: 0 points: Class I (very low risk - 0.4% complication rate)           Signed Electronically by: Nena Quiroz MD  Copy of this evaluation report is provided to requesting physician.

## 2023-01-19 NOTE — TELEPHONE ENCOUNTER
2nd attempt. LM for callback.    Needs to schedule separate appointment for procedure. CANNOT do this during physical with the provider.    Zully SAGASTUME CMA

## 2023-01-23 NOTE — TELEPHONE ENCOUNTER
Spoke to pt, pt scheduled on 03/02/2023.    Laquita SANCHEZ    [facility]

## 2023-01-28 ENCOUNTER — MYC MEDICAL ADVICE (OUTPATIENT)
Dept: FAMILY MEDICINE | Facility: CLINIC | Age: 48
End: 2023-01-28
Payer: COMMERCIAL

## 2023-03-03 ENCOUNTER — OFFICE VISIT (OUTPATIENT)
Dept: FAMILY MEDICINE | Facility: CLINIC | Age: 48
End: 2023-03-03
Payer: COMMERCIAL

## 2023-03-03 VITALS
BODY MASS INDEX: 23.83 KG/M2 | HEIGHT: 59 IN | DIASTOLIC BLOOD PRESSURE: 78 MMHG | TEMPERATURE: 98.2 F | OXYGEN SATURATION: 99 % | RESPIRATION RATE: 14 BRPM | SYSTOLIC BLOOD PRESSURE: 115 MMHG | HEART RATE: 71 BPM | WEIGHT: 118.2 LBS

## 2023-03-03 DIAGNOSIS — Z30.017 NEXPLANON INSERTION: Primary | ICD-10-CM

## 2023-03-03 DIAGNOSIS — Z30.46 NEXPLANON REMOVAL: ICD-10-CM

## 2023-03-03 DIAGNOSIS — Z30.09 BIRTH CONTROL COUNSELING: ICD-10-CM

## 2023-03-03 PROCEDURE — 99213 OFFICE O/P EST LOW 20 MIN: CPT | Mod: 25 | Performed by: FAMILY MEDICINE

## 2023-03-03 PROCEDURE — 11981 INSERTION DRUG DLVR IMPLANT: CPT | Performed by: FAMILY MEDICINE

## 2023-03-03 ASSESSMENT — PAIN SCALES - GENERAL: PAINLEVEL: NO PAIN (0)

## 2023-03-03 NOTE — PROGRESS NOTES
"  Assessment & Plan     Nexplanon insertion    - NEXPLANON INSERTION  - etonogestrel (NEXPLANON) subdermal implant 68 mg    Nexplanon removal    - NEXPLANON REMOVAL    Birth control counseling  She has question to check on menopause sx, has no current sx except irregular period, which could be from outdated nexplanon, will have her to continue on under new nexplanon for next 3 years and will be removed around March 2026, then will check menopausal hormone panel around September the same year           FUTURE APPOINTMENTS:       - Follow-up visit in 6 months for CPE    No follow-ups on file.    Jorge L Tucker MD  [facility] [place]    Subjective   Nena is a 48 year old presenting for the following health issues:  No chief complaint on file.      HPI     Concern - birth control  Onset: 3 years ago  Description: stable  Intensity: N/A  Progression of Symptoms:  same  Accompanying Signs & Symptoms: none  Previous history of similar problem: none  Precipitating factors:        Worsened by: none    Alleviating factors:        Improved by: none    Review of Systems   Constitutional, HEENT, cardiovascular, pulmonary, gi and gu systems are negative, except as otherwise noted.      Objective    /78   Pulse 71   Temp 98.2  F (36.8  C) (Temporal)   Resp 14   Ht 1.499 m (4' 11\")   Wt 53.6 kg (118 lb 3.2 oz)   LMP 12/12/2022 (Approximate)   SpO2 99%   BMI 23.87 kg/m    Body mass index is 23.87 kg/m .  Physical Exam   GENERAL: healthy, alert and no distress  NECK: no adenopathy, no asymmetry, masses, or scars and thyroid normal to palpation  RESP: lungs clear to auscultation - no rales, rhonchi or wheezes  CV: regular rate and rhythm, normal S1 S2, no S3 or S4, no murmur, click or rub, no peripheral edema and peripheral pulses strong  ABDOMEN: soft, nontender, no hepatosplenomegaly, no masses and bowel sounds normal  MS: no gross musculoskeletal defects noted, no edema            "           INDICATIONS:                                                      Patient presents for placement of Nexplanon for contraception.  She has had been counseled on side effects, risks, benefits and alternatives.  Patient desires to proceed.    Is a pregnancy test required: No.  Was a consent obtained?  Yes    Verification of Procedure:  Just before the procedure begans through verbal and active participation of team members    Consent:  Risks, benefits of treatment, and alternative options for contraception were discussed.  Patient's questions were elicited and answered.  Written consent was obtained and scanned into medical record.     Today's PHQ-2 Score:   PHQ-2 ( 1999 [employer]) 1/16/2023   Q1: Little interest or pleasure in doing things 0   Q2: Feeling down, depressed or hopeless 0   PHQ-2 Score 0   PHQ-2 Total Score (12-17 Years)- Positive if 3 or more points; Administer PHQ-A if positive -   Q1: Little interest or pleasure in doing things Not at all   Q2: Feeling down, depressed or hopeless Not at all   PHQ-2 Score 0       PROCEDURE:                                                        Patient was placed in dorsal supine position with right arm abducted and externally rotated. Nexplanon was palpated under skin. The area was cleansed with betadine. The distal site was injected with 2 ml of 1% plain lidocaine.  While pushing down on the proximal end, 2 mm incision was made over the proximal implant with a scalpel. The implant was grasped with a mosquito forceps and removed intact. Patient was resting comfortably on exam table,  Nexplanon device visualized in applicator by patient and provider.  Skin punctured with applicator at insertion site and advanced with ease in the subdermal space.  Applicator was removed.  Nexplanon was palpated by provider and patient.  Small amount of bleeding noted at insertion site and slight bruising noted along track of Nexplanon.  Bandage and pressure dressing applied to  insertion site.           Call if bleeding, pain or fever occur. and Birth control counseling given.        POST PROCEDURE:                                                        To be removed/replaced within three years. Written and verbal instructions provided to patient.  She tolerated the procedure well. There were no complications. Patient was discharged in stable condition.    Keep dressing on and dry for 24 hours, then remove wrap.  Replace bandaid daily for 5 days. Keep your user card in a safe place where you'll remember it.  Make note of today's date for removal/replacement of your Nexplanon in 3 years. Call us if there is any redness, tenderness, warmth or drainage from the area of your Nexplanon insertion. Use a backup method of birth control for 7 days.      Jorge L Tucker MD

## 2023-03-22 ASSESSMENT — ENCOUNTER SYMPTOMS
CONSTIPATION: 0
HEADACHES: 1
SORE THROAT: 0
MYALGIAS: 0
DYSURIA: 0
HEARTBURN: 1
COUGH: 0
HEMATURIA: 0
HEMATOCHEZIA: 0
FEVER: 0
NAUSEA: 0
EYE PAIN: 0
SHORTNESS OF BREATH: 0
NERVOUS/ANXIOUS: 0
ABDOMINAL PAIN: 0
CHILLS: 0
FREQUENCY: 0
WEAKNESS: 0
ARTHRALGIAS: 1
PARESTHESIAS: 0
PALPITATIONS: 0
DIZZINESS: 0
JOINT SWELLING: 0
DIARRHEA: 1
BREAST MASS: 0

## 2023-03-22 ASSESSMENT — ACTIVITIES OF DAILY LIVING (ADL): CURRENT_FUNCTION: NO ASSISTANCE NEEDED

## 2023-03-27 ENCOUNTER — OFFICE VISIT (OUTPATIENT)
Dept: FAMILY MEDICINE | Facility: CLINIC | Age: 48
End: 2023-03-27
Payer: COMMERCIAL

## 2023-03-27 ENCOUNTER — TELEPHONE (OUTPATIENT)
Dept: TRANSPLANT | Facility: CLINIC | Age: 48
End: 2023-03-27

## 2023-03-27 VITALS
RESPIRATION RATE: 14 BRPM | SYSTOLIC BLOOD PRESSURE: 127 MMHG | WEIGHT: 116 LBS | BODY MASS INDEX: 23.39 KG/M2 | OXYGEN SATURATION: 99 % | HEIGHT: 59 IN | DIASTOLIC BLOOD PRESSURE: 82 MMHG | TEMPERATURE: 97.2 F | HEART RATE: 72 BPM

## 2023-03-27 DIAGNOSIS — Z12.31 ENCOUNTER FOR SCREENING MAMMOGRAM FOR BREAST CANCER: ICD-10-CM

## 2023-03-27 DIAGNOSIS — Z94.0 KIDNEY TRANSPLANTED: Primary | ICD-10-CM

## 2023-03-27 DIAGNOSIS — Z94.0 KIDNEY REPLACED BY TRANSPLANT: Primary | ICD-10-CM

## 2023-03-27 DIAGNOSIS — Z79.899 ENCOUNTER FOR LONG-TERM CURRENT USE OF MEDICATION: ICD-10-CM

## 2023-03-27 DIAGNOSIS — Z00.00 HEALTHCARE MAINTENANCE: Primary | ICD-10-CM

## 2023-03-27 DIAGNOSIS — Z48.298 AFTERCARE FOLLOWING ORGAN TRANSPLANT: ICD-10-CM

## 2023-03-27 DIAGNOSIS — Z12.31 VISIT FOR SCREENING MAMMOGRAM: ICD-10-CM

## 2023-03-27 DIAGNOSIS — Z94.0 KIDNEY REPLACED BY TRANSPLANT: ICD-10-CM

## 2023-03-27 PROCEDURE — 90715 TDAP VACCINE 7 YRS/> IM: CPT | Performed by: FAMILY MEDICINE

## 2023-03-27 PROCEDURE — 90677 PCV20 VACCINE IM: CPT | Performed by: FAMILY MEDICINE

## 2023-03-27 PROCEDURE — 90471 IMMUNIZATION ADMIN: CPT | Performed by: FAMILY MEDICINE

## 2023-03-27 PROCEDURE — 99396 PREV VISIT EST AGE 40-64: CPT | Mod: 25 | Performed by: FAMILY MEDICINE

## 2023-03-27 PROCEDURE — 90472 IMMUNIZATION ADMIN EACH ADD: CPT | Performed by: FAMILY MEDICINE

## 2023-03-27 RX ORDER — TACROLIMUS 1 MG/1
1 TABLET, EXTENDED RELEASE ORAL
Qty: 90 TABLET | Refills: 3 | Status: SHIPPED | OUTPATIENT
Start: 2023-03-27 | End: 2024-01-08

## 2023-03-27 ASSESSMENT — ENCOUNTER SYMPTOMS
PALPITATIONS: 0
HEADACHES: 1
DYSURIA: 0
DIZZINESS: 0
DIARRHEA: 1
SHORTNESS OF BREATH: 0
PARESTHESIAS: 0
HEARTBURN: 1
NERVOUS/ANXIOUS: 0
SORE THROAT: 0
JOINT SWELLING: 0
MYALGIAS: 0
WEAKNESS: 0
BREAST MASS: 0
FREQUENCY: 0
HEMATOCHEZIA: 0
ABDOMINAL PAIN: 0
COUGH: 0
CHILLS: 0
CONSTIPATION: 0
HEMATURIA: 0
ARTHRALGIAS: 1
EYE PAIN: 0
FEVER: 0
NAUSEA: 0

## 2023-03-27 ASSESSMENT — PAIN SCALES - GENERAL: PAINLEVEL: NO PAIN (0)

## 2023-03-27 ASSESSMENT — ACTIVITIES OF DAILY LIVING (ADL): CURRENT_FUNCTION: NO ASSISTANCE NEEDED

## 2023-03-27 NOTE — PROGRESS NOTES
"   SUBJECTIVE:   CC: Nena is an 48 year old who presents for preventive health visit.   No flowsheet data found.  Patient has been advised of split billing requirements and indicates understanding: Yes  Healthy Habits:     In general, how would you rate your overall health?  Excellent    Frequency of exercise:  None    Do you usually eat at least 4 servings of fruit and vegetables a day, include whole grains    & fiber and avoid regularly eating high fat or \"junk\" foods?  No    Taking medications regularly:  Yes    Medication side effects:  Not applicable    Ability to successfully perform activities of daily living:  No assistance needed    Home Safety:  No safety concerns identified    Hearing Impairment:  No hearing concerns    In the past 6 months, have you been bothered by leaking of urine?  No    In general, how would you rate your overall mental or emotional health?  Excellent      PHQ-2 Total Score: 0    Additional concerns today:  Yes      Today's PHQ-2 Score:   PHQ-2 ( 1999 [employer]) 3/22/2023   Q1: Little interest or pleasure in doing things 0   Q2: Feeling down, depressed or hopeless 0   PHQ-2 Score 0   PHQ-2 Total Score (12-17 Years)- Positive if 3 or more points; Administer PHQ-A if positive -   Q1: Little interest or pleasure in doing things Not at all   Q2: Feeling down, depressed or hopeless Not at all   PHQ-2 Score 0           Social History     Tobacco Use     Smoking status: Never     Smokeless tobacco: Never   Substance Use Topics     Alcohol use: No     Alcohol/week: 0.0 standard drinks         Alcohol Use 3/22/2023   Prescreen: >3 drinks/day or >7 drinks/week? Not Applicable   No flowsheet data found.  Reviewed orders with patient.  Reviewed health maintenance and updated orders accordingly - Yes  BP Readings from Last 3 Encounters:   03/27/23 127/82   03/03/23 115/78   01/16/23 118/74    Wt Readings from Last 3 Encounters:   03/27/23 52.6 kg (116 lb)   03/03/23 53.6 kg (118 lb 3.2 oz) "   23 52.3 kg (115 lb 3.2 oz)                  Patient Active Problem List   Diagnosis     Status post kidney transplant     Aftercare following organ transplant     CARDIOVASCULAR SCREENING; LDL GOAL LESS THAN 100     Immunosuppressed status (H)     Moraxella catarrhalis pneumonia (H)     Pyelonephritis     Cervical cancer screening     History of recurrent urinary tract infection     EBV (Jaime-Barr virus) viremia     Vitamin D deficiency     Secondary cardiomyopathy (H)     Hyperparathyroidism (H)     Past Surgical History:   Procedure Laterality Date      SECTION  2014    Procedure:  SECTION;;  Surgeon: Griselda Becerra MD;  Location: [facility]     COLONOSCOPY N/A 2022    Procedure: COLONOSCOPY;  Surgeon: Gianna Santamaria MD;  Location:  GI     HEMODIALYSIS VIA AV FISTULA      left arm     INSERT INTRAUTERINE DEVICE      Paraguard. removed      REMOVE INTRAUTERINE DEVICE  2017     TRANSPLANT KIDNEY RECIPIENT LIVING UNRELATED  6/23/10       Social History     Tobacco Use     Smoking status: Never     Smokeless tobacco: Never   Substance Use Topics     Alcohol use: No     Alcohol/week: 0.0 standard drinks     Family History   Problem Relation Age of Onset     Respiratory Paternal Grandmother         PGRENATO had pulmonary TB at age 85, then  of old age at 89; she lived with Nena     Eye Disorder Mother         retinal problems         Current Outpatient Medications   Medication Sig Dispense Refill     Acetaminophen (TYLENOL PO) Take 500 mg by mouth every 4 hours as needed for mild pain or fever       azaTHIOprine (IMURAN) 50 MG tablet Take 1 tablet (50 mg) by mouth daily 90 tablet 3     etonogestrel (IMPLANON/NEXPLANON) 68 MG IMPL 1 each (68 mg) by Subdermal route once for 1 dose 1 each 0     predniSONE (DELTASONE) 5 MG tablet TAKE 1 TABLET (5 MG) BY MOUTH DAILY 90 tablet 3     sulfamethoxazole-trimethoprim (BACTRIM) 400-80 MG tablet Take 1 tablet by  mouth daily 90 tablet 3     tacrolimus (ENVARSUS XR) 1 MG 24 hr tablet Take 1 tablet (1 mg) by mouth daily before breakfast 90 tablet 3     Allergies   Allergen Reactions     Nkda [No Known Drug Allergies]      Recent Labs   Lab Test 12/23/21  1509 03/06/20  0909 12/13/19  0806 12/26/18  1142 08/31/18  0844 04/02/18  0909 03/17/18  0045 04/08/17  1036 03/30/17  1816   *  --  106*  --  89  --   --   --   --    HDL 54  --  54  --  63  --   --   --   --    TRIG 119  --  95  --  68  --   --   --   --    ALT 23  --   --   --   --   --  20  --  22   CR  --  0.91 1.14*   < > 0.81   < > 0.88   < > 1.32*   GFRESTIMATED  --  75 57*   < > 76   < > 70   < > 44*   GFRESTBLACK  --  87 66   < > >90   < > 84   < > 53*   POTASSIUM  --  4.4 3.9   < > 4.0   < > 3.4   < > 4.0    < > = values in this interval not displayed.        Breast Cancer Screening:    Breast CA Risk Assessment (FHS-7) 12/23/2021   Do you have a family history of breast, colon, or ovarian cancer? No / Unknown         Mammogram Screening: Recommended annual mammography  Pertinent mammograms are reviewed under the imaging tab.    History of abnormal Pap smear:   YES - other categories - see link Cervical Cytology Screening Guidelines  Last 3 Pap Results:   PAP (no units)   Date Value   11/06/2020 NIL   08/31/2018 NIL   02/02/2017 NIL     PAP / HPV Latest Ref Rng & Units 6/21/2022 11/6/2020 8/31/2018   PAP   Negative for Intraepithelial Lesion or Malignancy (NILM) - -   PAP (Historical) - - NIL NIL   HPV16 Negative Negative Negative Negative   HPV18 Negative Negative Negative Negative   HRHPV Negative Negative Negative Negative     She wants to schedule pap smear with female provider around June, which should be covered under health maintenance. Encouraged her to remind provider at next time     Reviewed and updated as needed this visit by clinical staff   Tobacco  Allergies  Meds              Reviewed and updated as needed this visit by Provider                  Past Medical History:   Diagnosis Date     Anemia      High risk medication use      Hyperparathyroidism, secondary renal (H)      Immunosuppressed status (H)      Kidney replaced by transplant 6/23/10    KIDNEY TRANSPLANT STATUS     Moraxella catarrhalis pneumonia (HCC) 3/28/2016     Pneumonia 2013    requiring hospitalization     Renal aplasia     since birth     Secondary cardiomyopathy (H) 2021     Single seizure (H)     felt secondary to uremia     TB (pulmonary tuberculosis)     treated with 4 drug regimen (INH, rifampin, ethambutol and pyrazinamide) for 6 months      Past Surgical History:   Procedure Laterality Date      SECTION  2014    Procedure:  SECTION;;  Surgeon: Griselda Becerra MD;  [facility]     COLONOSCOPY N/A 2022    Procedure: COLONOSCOPY;  Surgeon: Gianna Santamaria MD;  Location:  GI     HEMODIALYSIS VIA AV FISTULA      left arm     INSERT INTRAUTERINE DEVICE      Paraguard. removed      REMOVE INTRAUTERINE DEVICE  2017     TRANSPLANT KIDNEY RECIPIENT LIVING UNRELATED  6/23/10     OB History    Para Term  AB Living   1 1 0 1 0 1   SAB IAB Ectopic Multiple Live Births   0 0 0 0 1      # Outcome Date GA Lbr Karri/2nd Weight Sex Delivery Anes PTL Lv   1  14 34w0d  2.58 kg (5 lb 11 oz) M CS-LTranv  Y JEREMÍAS      Name: TOMMYBABYTea KEANU A      Apgar1: 3  Apgar5: 5       Review of Systems   Constitutional: Negative for chills and fever.   HENT: Negative for congestion, ear pain, hearing loss and sore throat.    Eyes: Negative for pain and visual disturbance.   Respiratory: Negative for cough and shortness of breath.    Cardiovascular: Negative for chest pain, palpitations and peripheral edema.   Gastrointestinal: Positive for diarrhea and heartburn. Negative for abdominal pain, constipation, hematochezia and nausea.   Breasts:  Negative for tenderness, breast mass and discharge.   Genitourinary:  "Negative for dysuria, frequency, genital sores, hematuria, pelvic pain, urgency, vaginal bleeding and vaginal discharge.   Musculoskeletal: Positive for arthralgias. Negative for joint swelling and myalgias.   Skin: Negative for rash.   Neurological: Positive for headaches. Negative for dizziness, weakness and paresthesias.   Psychiatric/Behavioral: Negative for mood changes. The patient is not nervous/anxious.           OBJECTIVE:   /82   Pulse 72   Temp 97.2  F (36.2  C) (Temporal)   Resp 14   Ht 1.487 m (4' 10.54\")   Wt 52.6 kg (116 lb)   SpO2 99%   BMI 23.80 kg/m    Physical Exam  GENERAL: healthy, alert and no distress  EYES: Eyes grossly normal to inspection, PERRL and conjunctivae and sclerae normal  HENT: ear canals and TM's normal, nose and mouth without ulcers or lesions  NECK: no adenopathy, no asymmetry, masses, or scars and thyroid normal to palpation  RESP: lungs clear to auscultation - no rales, rhonchi or wheezes  CV: regular rate and rhythm, normal S1 S2, no S3 or S4, no murmur, click or rub, no peripheral edema and peripheral pulses strong  ABDOMEN: soft, nontender, no hepatosplenomegaly, no masses and bowel sounds normal  MS: no gross musculoskeletal defects noted, no edema  SKIN: no suspicious lesions or rashes  NEURO: Normal strength and tone, mentation intact and speech normal        ASSESSMENT/PLAN:       ICD-10-CM    1. Healthcare maintenance  Z00.00 MA SCREENING DIGITAL BILAT - Future  (s+30)     Lipid panel reflex to direct LDL Fasting      2. Visit for screening mammogram  Z12.31 MA SCREENING DIGITAL BILAT - Future  (s+30)      3. Encounter for screening mammogram for breast cancer  Z12.31 MA SCREENING DIGITAL BILAT - Future  (s+30)          Patient has been advised of split billing requirements and indicates understanding: Yes      COUNSELING:  Reviewed preventive health counseling, as reflected in patient instructions        She reports that she has never smoked. She has " never used smokeless tobacco.          Jorge L Tucker MD  [facility]

## 2023-03-27 NOTE — TELEPHONE ENCOUNTER
ISSUE:   Tacrolimus XR level 6.7 on 3/23/23, goal 3-5, dose 1 mg daily. Previous level in range on current dosing.     PLAN:   Please call patient and confirm this was an accurate 24-hour trough. Verify Tacrolimus XR dose 1 mg daily. Confirm no new medications or illness. Confirm no missed doses. If accurate trough and accurate dose, stay on the same dose and repeat lab in 1 week.     LPN TASK:   Please contact patient to assess and review the plan.  Update prescription and place repeat lab orders as necessary.

## 2023-03-27 NOTE — LETTER
PHYSICIAN ORDERS      DATE & TIME ISSUED: 2023 7:21 AM  PATIENT NAME: Nena Underwood   : 1975     [facility] MR# [if applicable]: [id number]     DIAGNOSIS:  Kidney Transplant  ICD-10 CODE: Z94.0     Please repeat the following labs in 1 week:  Tacrolimus drug level  CBC  BMP    Any questions please call: [phone]  Please fax lab results to [phone].    .

## 2023-03-28 NOTE — TELEPHONE ENCOUNTER
Left message and sent ERUCES message to patient regarding:  Tacrolimus XR level 6.7 on 3/23/23, goal 3-5, dose 1 mg daily. Previous level in range on current dosing.      PLAN:   Please call patient and confirm this was an accurate 24-hour trough. Verify Tacrolimus XR dose 1 mg daily. Confirm no new medications or illness. Confirm no missed doses. If accurate trough and accurate dose, stay on the same dose and repeat lab in 1 week

## 2023-04-23 ENCOUNTER — HEALTH MAINTENANCE LETTER (OUTPATIENT)
Age: 48
End: 2023-04-23

## 2023-06-08 ENCOUNTER — OFFICE VISIT (OUTPATIENT)
Dept: NEPHROLOGY | Facility: CLINIC | Age: 48
End: 2023-06-08
Attending: INTERNAL MEDICINE
Payer: COMMERCIAL

## 2023-06-08 VITALS
OXYGEN SATURATION: 96 % | TEMPERATURE: 98.4 F | SYSTOLIC BLOOD PRESSURE: 122 MMHG | HEART RATE: 76 BPM | DIASTOLIC BLOOD PRESSURE: 79 MMHG | WEIGHT: 117 LBS | BODY MASS INDEX: 24 KG/M2

## 2023-06-08 DIAGNOSIS — D84.9 IMMUNOSUPPRESSED STATUS (H): ICD-10-CM

## 2023-06-08 DIAGNOSIS — Z48.298 AFTERCARE FOLLOWING ORGAN TRANSPLANT: ICD-10-CM

## 2023-06-08 DIAGNOSIS — E55.9 VITAMIN D DEFICIENCY: ICD-10-CM

## 2023-06-08 DIAGNOSIS — Z29.89 NEED FOR PNEUMOCYSTIS PROPHYLAXIS: ICD-10-CM

## 2023-06-08 DIAGNOSIS — Z94.0 KIDNEY REPLACED BY TRANSPLANT: Primary | ICD-10-CM

## 2023-06-08 PROCEDURE — 99214 OFFICE O/P EST MOD 30 MIN: CPT | Mod: GC | Performed by: INTERNAL MEDICINE

## 2023-06-08 PROCEDURE — 99211 OFF/OP EST MAY X REQ PHY/QHP: CPT | Performed by: INTERNAL MEDICINE

## 2023-06-08 ASSESSMENT — PAIN SCALES - GENERAL: PAINLEVEL: NO PAIN (0)

## 2023-06-08 NOTE — LETTER
6/8/2023       RE: Nena Underwood  [id number] [place] [address]     Dear Colleague,    Thank you for referring your patient, Nena Underwood, to the [facility] [place] at [facility]. Please see a copy of my visit note below.    TRANSPLANT NEPHROLOGY CHRONIC POST TRANSPLANT VISIT    Assessment & Plan   # LDKT: Stable   - Baseline Creatinine:  ~ 0.8-1   - Proteinuria: Normal (<0.2 grams)   - Date DSA Last Checked: Oct/2017      Latest DSA: No   - BK Viremia: No   - Kidney Tx Biopsy: No     # Immunosuppression: Tacrolimus extended release (goal 3-5), Azathioprine (dose 50 mg daily) and Prednisone (dose 5 mg daily)   - Continue with intensive monitoring of immunosuppression for efficacy and toxicity.   - Changes: Not at this time    # Infection Prophylaxis:   - PJP: Sulfa/TMP (Bactrim)    # Blood Pressure: Controlled;  Goal BP: < 130/80   - Changes: Not at this time    # Mineral Bone Disorder:   - Secondary renal hyperparathyroidism; PTH level: Not checked recently        On treatment: None  - Vitamin D; level: Low        On supplement: No  - Calcium; level: Normal        On supplement: No    # Electrolytes:   - Potassium; level: Normal        On supplement: No  - Magnesium; level: Normal        On supplement: No  - Bicarbonate; level: Normal        On supplement: No    # Skin Cancer Risk:    - Discussed sun protection and recommend regular follow up with Dermatology.    # Medical Compliance: Yes    # Health Maintenance and Vaccination Review: Reviewed and up to date    # Transplant History:  Etiology of Kidney Failure: Unknown etiology (no kidney biopsy)  Tx: LDKT  Transplant: 6/23/2010 (Kidney)  Significant changes in immunosuppression: None  Significant transplant-related complications: None    Transplant Office Phone Number: [phone]    Assessment and plan was discussed with the patient and she voiced her understanding and  agreement.    Return visit: Return in about 1 year (around 6/8/2024).    Patient seen and discussed with Dr Castillo Chi MD   Nephrology Fellow  [phone]    Attestation:  This patient has been seen and evaluated by me, Walter Chong MD.  I have reviewed the note and agree with plan of care as documented by the fellow.       Chief Complaint   Ms. Underwood is a 48 year old here for kidney transplant and immunosuppression management.    History of Present Illness      She is here for yearly follow-up. Had cataract surgery recently, went well. Denies chest pain, shortness of breath. No nausea, vomiting, abdominal pain or diarrhea. No fever or chills. No new skin lesions. Has gained some weight (~ 5 lbs). Slightly elevated blood glucose levels and elevated cholesterol level. Is following with her PCP regarding this.    Home BP: ~ 120s systolic    Problem List   Patient Active Problem List   Diagnosis    Kidney replaced by transplant    Aftercare following organ transplant    CARDIOVASCULAR SCREENING; LDL GOAL LESS THAN 100    Immunosuppressed status (H)    Moraxella catarrhalis pneumonia (H)    Pyelonephritis    Cervical cancer screening    History of recurrent urinary tract infection    EBV (Jaime-Barr virus) viremia    Vitamin D deficiency    Secondary cardiomyopathy (H)    Hyperparathyroidism (H)    Need for pneumocystis prophylaxis       Allergies   Allergies   Allergen Reactions    Nkda [No Known Drug Allergy]        Medications   Current Outpatient Medications   Medication Sig    Acetaminophen (TYLENOL PO) Take 500 mg by mouth every 4 hours as needed for mild pain or fever    azaTHIOprine (IMURAN) 50 MG tablet Take 1 tablet (50 mg) by mouth daily    predniSONE (DELTASONE) 5 MG tablet TAKE 1 TABLET (5 MG) BY MOUTH DAILY    sulfamethoxazole-trimethoprim (BACTRIM) 400-80 MG tablet Take 1 tablet by mouth daily    tacrolimus (ENVARSUS XR) 1 MG 24 hr tablet Take 1 tablet (1 mg) by mouth daily before  breakfast    etonogestrel (IMPLANON/NEXPLANON) 68 MG IMPL 1 each (68 mg) by Subdermal route once for 1 dose     Current Facility-Administered Medications   Medication    etonogestrel (NEXPLANON) subdermal implant 68 mg     There are no discontinued medications.    Physical Exam   Vital Signs: /79 (BP Location: Right arm, Patient Position: Sitting, Cuff Size: Adult Regular)   Pulse 76   Temp 98.4  F (36.9  C) (Oral)   Wt 53.1 kg (117 lb)   SpO2 96%   BMI 24.00 kg/m      GENERAL APPEARANCE: alert and no distress  HENT: mouth without ulcers or lesions  LYMPHATICS: no cervical or supraclavicular nodes  RESP: lungs clear to auscultation - no rales, rhonchi or wheezes  CV: regular rhythm, normal rate, no rub, no murmur  EDEMA: no LE edema bilaterally  ABDOMEN: soft, nondistended, nontender, bowel sounds normal  MS: extremities normal - no gross deformities noted, no evidence of inflammation in joints, no muscle tenderness  SKIN: no rash  Lymph Node: No lymphadenopathy  TX KIDNEY: normal  DIALYSIS ACCESS: none      Data         Latest Ref Rng & Units 5/31/2023     7:57 AM 3/23/2023     8:50 AM 1/13/2023     7:46 AM   Renal   Na (external) 135 - 144 mmol/L 139   141      139        K (external) 3.4 - 5.1 mmol/L 4.6   4.9      4.6        Cl 98 - 107 mmol/L 106   107      105        Cl (external) 98 - 107 mmol/L 106   107      105        CO2 (external) 22 - 30 mmol/L 24   27      27        BUN (external) 7 - 17 mg/dL 17   22      23        Cr (external) 0.52 - 1.04 mg/dL 1.00   1.05      1.01        Glucose (external) 74 - 100 mg/dL 111   104      115        Ca (external) 8.6 - 10.3 mg/dL 9.3   9.7      9.6            This result is from an external source.         Latest Ref Rng & Units 6/21/2022     5:36 PM 7/1/2019     4:30 PM 6/14/2017     7:20 AM   Bone Health   Phosphorus 2.5 - 4.5 mg/dL  2.8   3.2     Vit D Def 20 - 75 ug/L 19   21            Latest Ref Rng & Units 5/31/2023     7:57 AM 3/23/2023     8:50 AM  1/13/2023     7:46 AM   Heme   WBC (external) 4.0 - 11.0 10(3)/uL 4.7   4.8      5.2        Hgb (external) 12.0 - 16.0 g/dL 14.2   14.3      14.2        Plt (external) 150 - 420 10(3)/uL 263   269      268            This result is from an external source.         Latest Ref Rng & Units 12/23/2021     3:09 PM 7/1/2019     4:30 PM 3/17/2018    12:45 AM   Liver   AP 40 - 150 U/L 82    69     TBili 0.2 - 1.3 mg/dL 0.4    1.1     Bilirubin Direct 0.0 - 0.2 mg/dL 0.1       ALT 0 - 50 U/L 23    20     AST 0 - 45 U/L 24    28     Tot Protein 6.8 - 8.8 g/dL 7.3    8.0     Albumin 3.4 - 5.0 g/dL 3.9   4.0   4.2           Latest Ref Rng & Units 3/17/2018    12:45 AM 10/2/2015     8:40 PM 9/27/2015    12:00 PM   Pancreas   Lipase 73 - 393 U/L 241   208   210              Latest Ref Rng & Units 6/30/2018    10:08 AM 6/9/2018     9:13 AM 5/5/2018     8:55 AM   [facility] Virology   EBV DNA COPIES/ML EBVNEG^EBV DNA Not Detected [Copies]/mL 1,688   3,998   1,239     EBV DNA LOG OF COPIES <2.7 [Log_copies]/mL 3.2   3.6   3.1          Recent Labs   Lab Test 06/25/19  0746 12/13/19  0805 03/06/20  0910   DOSTAC 06/24/2019 at 0710 am LAST DOSE 640 AM 12/12 LAST DOSE 0930AM ON 3/5/2020   TACROL 5.5 5.0 3.0*               Again, thank you for allowing me to participate in the care of your patient.      Sincerely,    Walter Chong MD

## 2023-06-08 NOTE — PROGRESS NOTES
TRANSPLANT NEPHROLOGY CHRONIC POST TRANSPLANT VISIT    Assessment & Plan   # LDKT: Stable   - Baseline Creatinine:  ~ 0.8-1   - Proteinuria: Normal (<0.2 grams)   - Date DSA Last Checked: Oct/2017      Latest DSA: No   - BK Viremia: No   - Kidney Tx Biopsy: No     # Immunosuppression: Tacrolimus extended release (goal 3-5), Azathioprine (dose 50 mg daily) and Prednisone (dose 5 mg daily)   - Continue with intensive monitoring of immunosuppression for efficacy and toxicity.   - Changes: Not at this time    # Infection Prophylaxis:   - PJP: Sulfa/TMP (Bactrim)    # Blood Pressure: Controlled;  Goal BP: < 130/80   - Changes: Not at this time    # Mineral Bone Disorder:   - Secondary renal hyperparathyroidism; PTH level: Not checked recently        On treatment: None  - Vitamin D; level: Low        On supplement: No  - Calcium; level: Normal        On supplement: No    # Electrolytes:   - Potassium; level: Normal        On supplement: No  - Magnesium; level: Normal        On supplement: No  - Bicarbonate; level: Normal        On supplement: No    # Skin Cancer Risk:    - Discussed sun protection and recommend regular follow up with Dermatology.    # Medical Compliance: Yes    # Health Maintenance and Vaccination Review: Reviewed and up to date    # Transplant History:  Etiology of Kidney Failure: Unknown etiology (no kidney biopsy)  Tx: LDKT  Transplant: 6/23/2010 (Kidney)  Significant changes in immunosuppression: None  Significant transplant-related complications: None    Transplant Office Phone Number: [phone]    Assessment and plan was discussed with the patient and she voiced her understanding and agreement.    Return visit: Return in about 1 year (around 6/8/2024).    Patient seen and discussed with Dr Castillo Chi MD   Nephrology Fellow  [phone]    Attestation:  This patient has been seen and evaluated by me, Walter Chong MD.  I have reviewed the note and agree with plan of care as  documented by the fellow.       Chief Complaint   Ms. Underwood is a 48 year old here for kidney transplant and immunosuppression management.    History of Present Illness      She is here for yearly follow-up. Had cataract surgery recently, went well. Denies chest pain, shortness of breath. No nausea, vomiting, abdominal pain or diarrhea. No fever or chills. No new skin lesions. Has gained some weight (~ 5 lbs). Slightly elevated blood glucose levels and elevated cholesterol level. Is following with her PCP regarding this.    Home BP: ~ 120s systolic    Problem List   Patient Active Problem List   Diagnosis     Kidney replaced by transplant     Aftercare following organ transplant     CARDIOVASCULAR SCREENING; LDL GOAL LESS THAN 100     Immunosuppressed status (H)     Moraxella catarrhalis pneumonia (H)     Pyelonephritis     Cervical cancer screening     History of recurrent urinary tract infection     EBV (Jaime-Barr virus) viremia     Vitamin D deficiency     Secondary cardiomyopathy (H)     Hyperparathyroidism (H)     Need for pneumocystis prophylaxis       Allergies   Allergies   Allergen Reactions     Nkda [No Known Drug Allergy]        Medications   Current Outpatient Medications   Medication Sig     Acetaminophen (TYLENOL PO) Take 500 mg by mouth every 4 hours as needed for mild pain or fever     azaTHIOprine (IMURAN) 50 MG tablet Take 1 tablet (50 mg) by mouth daily     predniSONE (DELTASONE) 5 MG tablet TAKE 1 TABLET (5 MG) BY MOUTH DAILY     sulfamethoxazole-trimethoprim (BACTRIM) 400-80 MG tablet Take 1 tablet by mouth daily     tacrolimus (ENVARSUS XR) 1 MG 24 hr tablet Take 1 tablet (1 mg) by mouth daily before breakfast     etonogestrel (IMPLANON/NEXPLANON) 68 MG IMPL 1 each (68 mg) by Subdermal route once for 1 dose     Current Facility-Administered Medications   Medication     etonogestrel (NEXPLANON) subdermal implant 68 mg     There are no discontinued medications.    Physical Exam   Vital Signs:  /79 (BP Location: Right arm, Patient Position: Sitting, Cuff Size: Adult Regular)   Pulse 76   Temp 98.4  F (36.9  C) (Oral)   Wt 53.1 kg (117 lb)   SpO2 96%   BMI 24.00 kg/m      GENERAL APPEARANCE: alert and no distress  HENT: mouth without ulcers or lesions  LYMPHATICS: no cervical or supraclavicular nodes  RESP: lungs clear to auscultation - no rales, rhonchi or wheezes  CV: regular rhythm, normal rate, no rub, no murmur  EDEMA: no LE edema bilaterally  ABDOMEN: soft, nondistended, nontender, bowel sounds normal  MS: extremities normal - no gross deformities noted, no evidence of inflammation in joints, no muscle tenderness  SKIN: no rash  Lymph Node: No lymphadenopathy  TX KIDNEY: normal  DIALYSIS ACCESS: none      Data         Latest Ref Rng & Units 5/31/2023     7:57 AM 3/23/2023     8:50 AM 1/13/2023     7:46 AM   Renal   Na (external) 135 - 144 mmol/L 139   141      139        K (external) 3.4 - 5.1 mmol/L 4.6   4.9      4.6        Cl 98 - 107 mmol/L 106   107      105        Cl (external) 98 - 107 mmol/L 106   107      105        CO2 (external) 22 - 30 mmol/L 24   27      27        BUN (external) 7 - 17 mg/dL 17   22      23        Cr (external) 0.52 - 1.04 mg/dL 1.00   1.05      1.01        Glucose (external) 74 - 100 mg/dL 111   104      115        Ca (external) 8.6 - 10.3 mg/dL 9.3   9.7      9.6            This result is from an external source.         Latest Ref Rng & Units 6/21/2022     5:36 PM 7/1/2019     4:30 PM 6/14/2017     7:20 AM   Bone Health   Phosphorus 2.5 - 4.5 mg/dL  2.8   3.2     Vit D Def 20 - 75 ug/L 19   21            Latest Ref Rng & Units 5/31/2023     7:57 AM 3/23/2023     8:50 AM 1/13/2023     7:46 AM   Heme   WBC (external) 4.0 - 11.0 10(3)/uL 4.7   4.8      5.2        Hgb (external) 12.0 - 16.0 g/dL 14.2   14.3      14.2        Plt (external) 150 - 420 10(3)/uL 263   269      268            This result is from an external source.         Latest Ref Rng & Units  12/23/2021     3:09 PM 7/1/2019     4:30 PM 3/17/2018    12:45 AM   Liver   AP 40 - 150 U/L 82    69     TBili 0.2 - 1.3 mg/dL 0.4    1.1     Bilirubin Direct 0.0 - 0.2 mg/dL 0.1       ALT 0 - 50 U/L 23    20     AST 0 - 45 U/L 24    28     Tot Protein 6.8 - 8.8 g/dL 7.3    8.0     Albumin 3.4 - 5.0 g/dL 3.9   4.0   4.2           Latest Ref Rng & Units 3/17/2018    12:45 AM 10/2/2015     8:40 PM 9/27/2015    12:00 PM   Pancreas   Lipase 73 - 393 U/L 241   208   210              Latest Ref Rng & Units 6/30/2018    10:08 AM 6/9/2018     9:13 AM 5/5/2018     8:55 AM   [facility]   EBV DNA COPIES/ML EBVNEG^EBV DNA Not Detected [Copies]/mL 1,688   3,998   1,239     EBV DNA LOG OF COPIES <2.7 [Log_copies]/mL 3.2   3.6   3.1          Recent Labs   Lab Test 06/25/19  0746 12/13/19  0805 03/06/20  0910   DOSTAC 06/24/2019 at 0710 am LAST DOSE 640 AM 12/12 LAST DOSE 0930AM ON 3/5/2020   TACROL 5.5 5.0 3.0*

## 2023-06-08 NOTE — LETTER
6/8/2023      RE: Nena Underwood  [id number] [place] [address]       TRANSPLANT NEPHROLOGY CHRONIC POST TRANSPLANT VISIT    Assessment & Plan   # LDKT: Stable   - Baseline Creatinine:  ~ 0.8-1   - Proteinuria: Normal (<0.2 grams)   - Date DSA Last Checked: Oct/2017      Latest DSA: No   - BK Viremia: No   - Kidney Tx Biopsy: No     # Immunosuppression: Tacrolimus extended release (goal 3-5), Azathioprine (dose 50 mg daily) and Prednisone (dose 5 mg daily)   - Continue with intensive monitoring of immunosuppression for efficacy and toxicity.   - Changes: Not at this time    # Infection Prophylaxis:   - PJP: Sulfa/TMP (Bactrim)    # Blood Pressure: Controlled;  Goal BP: < 130/80   - Changes: Not at this time    # Mineral Bone Disorder:   - Secondary renal hyperparathyroidism; PTH level: Not checked recently        On treatment: None  - Vitamin D; level: Low        On supplement: No  - Calcium; level: Normal        On supplement: No    # Electrolytes:   - Potassium; level: Normal        On supplement: No  - Magnesium; level: Normal        On supplement: No  - Bicarbonate; level: Normal        On supplement: No    # Skin Cancer Risk:    - Discussed sun protection and recommend regular follow up with Dermatology.    # Medical Compliance: Yes    # Health Maintenance and Vaccination Review: Reviewed and up to date    # Transplant History:  Etiology of Kidney Failure: Unknown etiology (no kidney biopsy)  Tx: LDKT  Transplant: 6/23/2010 (Kidney)  Significant changes in immunosuppression: None  Significant transplant-related complications: None    Transplant Office Phone Number: [phone]    Assessment and plan was discussed with the patient and she voiced her understanding and agreement.    Return visit: Return in about 1 year (around 6/8/2024).    Patient seen and discussed with Dr Castillo Chi MD   Nephrology Fellow  [phone]    Attestation:  This patient has been seen and evaluated by Walter spivey  Ric Chong MD.  I have reviewed the note and agree with plan of care as documented by the fellow.       Chief Complaint   Ms. Underwood is a 48 year old here for kidney transplant and immunosuppression management.    History of Present Illness      She is here for yearly follow-up. Had cataract surgery recently, went well. Denies chest pain, shortness of breath. No nausea, vomiting, abdominal pain or diarrhea. No fever or chills. No new skin lesions. Has gained some weight (~ 5 lbs). Slightly elevated blood glucose levels and elevated cholesterol level. Is following with her PCP regarding this.    Home BP: ~ 120s systolic    Problem List   Patient Active Problem List   Diagnosis     Kidney replaced by transplant     Aftercare following organ transplant     CARDIOVASCULAR SCREENING; LDL GOAL LESS THAN 100     Immunosuppressed status (H)     Moraxella catarrhalis pneumonia (H)     Pyelonephritis     Cervical cancer screening     History of recurrent urinary tract infection     EBV (Jaime-Barr virus) viremia     Vitamin D deficiency     Secondary cardiomyopathy (H)     Hyperparathyroidism (H)     Need for pneumocystis prophylaxis       Allergies   Allergies   Allergen Reactions     Nkda [No Known Drug Allergy]        Medications   Current Outpatient Medications   Medication Sig     Acetaminophen (TYLENOL PO) Take 500 mg by mouth every 4 hours as needed for mild pain or fever     azaTHIOprine (IMURAN) 50 MG tablet Take 1 tablet (50 mg) by mouth daily     predniSONE (DELTASONE) 5 MG tablet TAKE 1 TABLET (5 MG) BY MOUTH DAILY     sulfamethoxazole-trimethoprim (BACTRIM) 400-80 MG tablet Take 1 tablet by mouth daily     tacrolimus (ENVARSUS XR) 1 MG 24 hr tablet Take 1 tablet (1 mg) by mouth daily before breakfast     etonogestrel (IMPLANON/NEXPLANON) 68 MG IMPL 1 each (68 mg) by Subdermal route once for 1 dose     Current Facility-Administered Medications   Medication     etonogestrel (NEXPLANON) subdermal implant 68 mg      There are no discontinued medications.    Physical Exam   Vital Signs: /79 (BP Location: Right arm, Patient Position: Sitting, Cuff Size: Adult Regular)   Pulse 76   Temp 98.4  F (36.9  C) (Oral)   Wt 53.1 kg (117 lb)   SpO2 96%   BMI 24.00 kg/m      GENERAL APPEARANCE: alert and no distress  HENT: mouth without ulcers or lesions  LYMPHATICS: no cervical or supraclavicular nodes  RESP: lungs clear to auscultation - no rales, rhonchi or wheezes  CV: regular rhythm, normal rate, no rub, no murmur  EDEMA: no LE edema bilaterally  ABDOMEN: soft, nondistended, nontender, bowel sounds normal  MS: extremities normal - no gross deformities noted, no evidence of inflammation in joints, no muscle tenderness  SKIN: no rash  Lymph Node: No lymphadenopathy  TX KIDNEY: normal  DIALYSIS ACCESS: none      Data         Latest Ref Rng & Units 5/31/2023     7:57 AM 3/23/2023     8:50 AM 1/13/2023     7:46 AM   Renal   Na (external) 135 - 144 mmol/L 139   141      139        K (external) 3.4 - 5.1 mmol/L 4.6   4.9      4.6        Cl 98 - 107 mmol/L 106   107      105        Cl (external) 98 - 107 mmol/L 106   107      105        CO2 (external) 22 - 30 mmol/L 24   27      27        BUN (external) 7 - 17 mg/dL 17   22      23        Cr (external) 0.52 - 1.04 mg/dL 1.00   1.05      1.01        Glucose (external) 74 - 100 mg/dL 111   104      115        Ca (external) 8.6 - 10.3 mg/dL 9.3   9.7      9.6            This result is from an external source.         Latest Ref Rng & Units 6/21/2022     5:36 PM 7/1/2019     4:30 PM 6/14/2017     7:20 AM   Bone Health   Phosphorus 2.5 - 4.5 mg/dL  2.8   3.2     Vit D Def 20 - 75 ug/L 19   21            Latest Ref Rng & Units 5/31/2023     7:57 AM 3/23/2023     8:50 AM 1/13/2023     7:46 AM   Heme   WBC (external) 4.0 - 11.0 10(3)/uL 4.7   4.8      5.2        Hgb (external) 12.0 - 16.0 g/dL 14.2   14.3      14.2        Plt (external) 150 - 420 10(3)/uL 263   095 920             This result is from an external source.         Latest Ref Rng & Units 12/23/2021     3:09 PM 7/1/2019     4:30 PM 3/17/2018    12:45 AM   Liver   AP 40 - 150 U/L 82    69     TBili 0.2 - 1.3 mg/dL 0.4    1.1     Bilirubin Direct 0.0 - 0.2 mg/dL 0.1       ALT 0 - 50 U/L 23    20     AST 0 - 45 U/L 24    28     Tot Protein 6.8 - 8.8 g/dL 7.3    8.0     Albumin 3.4 - 5.0 g/dL 3.9   4.0   4.2           Latest Ref Rng & Units 3/17/2018    12:45 AM 10/2/2015     8:40 PM 9/27/2015    12:00 PM   Pancreas   Lipase 73 - 393 U/L 241   208   210              Latest Ref Rng & Units 6/30/2018    10:08 AM 6/9/2018     9:13 AM 5/5/2018     8:55 AM   [facility]rology   EBV DNA COPIES/ML EBVNEG^EBV DNA Not Detected [Copies]/mL 1,688   3,998   1,239     EBV DNA LOG OF COPIES <2.7 [Log_copies]/mL 3.2   3.6   3.1          Recent Labs   Lab Test 06/25/19  0746 12/13/19  0805 03/06/20  0910   DOSTAC 06/24/2019 at 0710 am LAST DOSE 640 AM 12/12 LAST DOSE 0930AM ON 3/5/2020   TACROL 5.5 5.0 3.0*               Walter Chong MD

## 2023-06-08 NOTE — NURSING NOTE
Chief Complaint   Patient presents with     RECHECK     follow up/ per pt     /79 (BP Location: Right arm, Patient Position: Sitting, Cuff Size: Adult Regular)   Pulse 76   Temp 98.4  F (36.9  C) (Oral)   Wt 53.1 kg (117 lb)   SpO2 96%   BMI 24.00 kg/m    [facility]

## 2023-06-08 NOTE — PATIENT INSTRUCTIONS
Patient Recommendations:  - No new recommendations at this time.    Transplant Patient Information  Your Post Transplant Coordinator is: Melvi Birmingham  For non urgent items, we encourage you to contact your coordinator/care team online via [employer]  You and your care team can also contact your transplant coordinator Monday - Friday, 8am - 5pm at [phone] (Option 2 to reach the coordinator or Option 4 to schedule an appointment).  After hours for urgent matters, please call [facility] at [phone].

## 2023-06-17 PROBLEM — Z29.89 NEED FOR PNEUMOCYSTIS PROPHYLAXIS: Status: ACTIVE | Noted: 2023-06-17

## 2023-06-19 ENCOUNTER — MYC MEDICAL ADVICE (OUTPATIENT)
Dept: OTHER | Age: 48
End: 2023-06-19

## 2023-10-08 NOTE — TELEPHONE ENCOUNTER
Procedure visit  Should be  to the different day from CPE for insurance purpose,    Will verbalize decreased distress related to disturbance of thinking to 2 on a 10-point scale

## 2023-10-10 DIAGNOSIS — Z94.0 KIDNEY TRANSPLANTED: Primary | ICD-10-CM

## 2023-10-10 DIAGNOSIS — Z94.0 KIDNEY REPLACED BY TRANSPLANT: ICD-10-CM

## 2023-10-10 RX ORDER — AZATHIOPRINE 50 MG/1
50 TABLET ORAL DAILY
Qty: 90 TABLET | Refills: 3 | Status: SHIPPED | OUTPATIENT
Start: 2023-10-10 | End: 2024-08-05

## 2023-10-10 RX ORDER — PREDNISONE 5 MG/1
5 TABLET ORAL DAILY
Qty: 90 TABLET | Refills: 3 | Status: SHIPPED | OUTPATIENT
Start: 2023-10-10

## 2023-12-11 ENCOUNTER — HOSPITAL ENCOUNTER (OUTPATIENT)
Dept: MAMMOGRAPHY | Facility: CLINIC | Age: 48
Discharge: HOME | End: 2023-12-11
Attending: FAMILY MEDICINE | Admitting: FAMILY MEDICINE
Payer: COMMERCIAL

## 2023-12-11 DIAGNOSIS — Z12.31 VISIT FOR SCREENING MAMMOGRAM: ICD-10-CM

## 2023-12-11 PROCEDURE — 77067 SCR MAMMO BI INCL CAD: CPT

## 2023-12-21 ENCOUNTER — HOSPITAL ENCOUNTER (OUTPATIENT)
Dept: MAMMOGRAPHY | Facility: CLINIC | Age: 48
Discharge: HOME | End: 2023-12-21
Attending: FAMILY MEDICINE
Payer: COMMERCIAL

## 2023-12-21 DIAGNOSIS — R92.8 ABNORMAL MAMMOGRAM: ICD-10-CM

## 2023-12-21 PROCEDURE — 76642 ULTRASOUND BREAST LIMITED: CPT | Mod: LT

## 2023-12-21 PROCEDURE — 77061 BREAST TOMOSYNTHESIS UNI: CPT | Mod: LT

## 2023-12-27 ENCOUNTER — TELEPHONE (OUTPATIENT)
Dept: TRANSPLANT | Facility: CLINIC | Age: 48
End: 2023-12-27
Payer: COMMERCIAL

## 2023-12-27 DIAGNOSIS — Z94.0 KIDNEY REPLACED BY TRANSPLANT: Primary | ICD-10-CM

## 2023-12-27 DIAGNOSIS — Z48.298 AFTERCARE FOLLOWING ORGAN TRANSPLANT: ICD-10-CM

## 2023-12-27 NOTE — TELEPHONE ENCOUNTER
OUTCOME  Spoke to pt who confirms accurate dose and trough with no real recent illness (some occasional diarrhea).     Pt would like to repeat level 12/29/23. If still elevated will decrease dose to  0.75mg daily. For now, continue 1mg daily.

## 2023-12-27 NOTE — TELEPHONE ENCOUNTER
ISSUE:   Tacrolimus XR level 7.3 on 12/22/23, goal 3-5, dose 1 mg daily.    PLAN:   Call Patientand confirm this was an accurate 24-hour trough.   Verify Tacrolimus XR dose 1 mg daily.   Confirm no new medications or illness.   Confirm no missed doses.   If accurate trough and accurate dose, decrease Tacrolimus XR dose to 0.75 mg daily     Is this more than a 50% increase or decrease in current IS dose: No  If YES, justification: n/a    Repeat labs in 2 weeks.  *If > 50% change in immunosuppression dose, repeat labs in 1 week.     OUTCOME:   Left message for pt to call back to discuss.

## 2024-01-03 NOTE — TELEPHONE ENCOUNTER
"Hospital/TCU/ED for chronic condition Discharge Protocol    \"Hi, my name is Miguelina Mcgraw, a registered nurse, and I am calling from [facility].  I am calling to follow up and see how things are going for you after your recent emergency visit/hospital/TCU stay.\"    Tell me how you are doing now that you are home?\" off work today- feeling mildly weak, neck discomfort started at hospital yesterday at 4:00 pm doctor examined neck and told her to put Bengay on it      Discharge Instructions    \"Let's review your discharge instructions.  What is/are the follow-up recommendations?  Pt. Response: f/u up with pcp    \"Has an appointment with your primary care provider been scheduled?\"   Yes. (confirm) rescheduled to 03/30/17 with Brad Werner - patient needs the latest appointment possible or she cannot come in    \"When you see the provider, I would recommend that you bring your medications with you.\"    Medications    \"Tell me what changed about your medicines when you discharged?\"    Changes to chronic meds?    0-1    \"What questions do you have about your medications?\"    None     New diagnoses of heart failure, COPD, diabetes, or MI?    No              Medication reconciliation completed? Yes  Was MTM referral placed (*Make sure to put transitions as reason for referral)?   No    Call Summary    \"What questions or concerns do you have about your recent visit and your follow-up care?\"     none    \"If you have questions or things don't continue to improve, we encourage you contact us through the main clinic number (give number).  Even if the clinic is not open, triage nurses are available 24/7 to help you.     We would like you to know that our clinic has extended hours (provide information).  We also have urgent care (provide details on closest location and hours/contact info)\"      \"Thank you for your time and take care!\"           " No risk alerts present

## 2024-01-04 ENCOUNTER — TELEPHONE (OUTPATIENT)
Dept: TRANSPLANT | Facility: CLINIC | Age: 49
End: 2024-01-04
Payer: COMMERCIAL

## 2024-01-04 NOTE — LETTER
PHYSICIAN ORDERS      DATE & TIME ISSUED: 2024 11:21 AM  PATIENT NAME: Nena Underwood   : 1975     [facility] MR# [if applicable]: [id number]     DIAGNOSIS:  Kidney transplant   ICD-10 CODE: Z94.0      Please repeat the following level in 1-2 weeks  Tacrolimus level (ensure 24 hours between last dose and blood draw)      Any questions please call: [phone] option 5    Please fax each result same day as resulted/available    Critical lab results page [phone]    Please fax results to [phone].    .

## 2024-01-04 NOTE — TELEPHONE ENCOUNTER
ISSUE:   Tacrolimus XR level 6.1 on 1/2/24, goal 3-5, dose 1 mg daily.  Previous level high on this dose.     PLAN:   Call Patientand confirm this was an accurate 24-hour trough.   Verify Tacrolimus XR dose 1 mg daily.   Confirm no new medications or illness.   Confirm no missed doses.   If accurate trough and accurate dose, decrease Tacrolimus XR dose to 0.75 mg daily     Is this more than a 50% increase or decrease in current IS dose: No  If YES, justification: N/A.    Repeat labs in 1-2 weeks after dose change.

## 2024-01-05 ENCOUNTER — MYC MEDICAL ADVICE (OUTPATIENT)
Dept: TRANSPLANT | Facility: CLINIC | Age: 49
End: 2024-01-05
Payer: COMMERCIAL

## 2024-01-05 DIAGNOSIS — Z94.0 KIDNEY TRANSPLANTED: ICD-10-CM

## 2024-01-05 DIAGNOSIS — Z94.0 KIDNEY REPLACED BY TRANSPLANT: Primary | ICD-10-CM

## 2024-01-05 NOTE — TELEPHONE ENCOUNTER
[employer] message sent to patient regarding:  ISSUE:   Tacrolimus XR level 6.1 on 1/2/24, goal 3-5, dose 1 mg daily.  Previous level high on this dose.      PLAN:   Call Patientand confirm this was an accurate 24-hour trough.   Verify Tacrolimus XR dose 1 mg daily.   Confirm no new medications or illness.   Confirm no missed doses.   If accurate trough and accurate dose, decrease Tacrolimus XR dose to 0.75 mg daily      Is this more than a 50% increase or decrease in current IS dose: No  If YES, justification: N/A.     Repeat labs in 1-2 weeks after dose change.

## 2024-01-06 DIAGNOSIS — D84.9 IMMUNOSUPPRESSED STATUS (H): ICD-10-CM

## 2024-01-08 RX ORDER — TACROLIMUS 0.75 MG/1
0.75 TABLET, EXTENDED RELEASE ORAL
Qty: 90 TABLET | Refills: 3 | Status: SHIPPED | OUTPATIENT
Start: 2024-01-08

## 2024-01-08 RX ORDER — TACROLIMUS 1 MG/1
1 TABLET, EXTENDED RELEASE ORAL
Qty: 90 TABLET | Refills: 3 | Status: SHIPPED | OUTPATIENT
Start: 2024-01-08

## 2024-01-08 RX ORDER — SULFAMETHOXAZOLE AND TRIMETHOPRIM 400; 80 MG/1; MG/1
1 TABLET ORAL DAILY
Qty: 90 TABLET | Refills: 3 | Status: SHIPPED | OUTPATIENT
Start: 2024-01-08

## 2024-01-09 NOTE — TELEPHONE ENCOUNTER
Call placed to patient. No answer. Detailed voice message left with instructions listed below. Order sent

## 2024-03-27 ENCOUNTER — OFFICE VISIT (OUTPATIENT)
Dept: FAMILY MEDICINE | Facility: CLINIC | Age: 49
End: 2024-03-27
Payer: COMMERCIAL

## 2024-03-27 VITALS
HEIGHT: 59 IN | OXYGEN SATURATION: 100 % | HEART RATE: 82 BPM | RESPIRATION RATE: 17 BRPM | SYSTOLIC BLOOD PRESSURE: 128 MMHG | TEMPERATURE: 97.2 F | DIASTOLIC BLOOD PRESSURE: 84 MMHG | WEIGHT: 118 LBS | BODY MASS INDEX: 23.79 KG/M2

## 2024-03-27 DIAGNOSIS — Z00.00 HEALTH MAINTENANCE EXAMINATION: Primary | ICD-10-CM

## 2024-03-27 DIAGNOSIS — Z12.4 CERVICAL CANCER SCREENING: ICD-10-CM

## 2024-03-27 PROCEDURE — 99396 PREV VISIT EST AGE 40-64: CPT | Performed by: FAMILY MEDICINE

## 2024-03-27 PROCEDURE — 80061 LIPID PANEL: CPT | Performed by: FAMILY MEDICINE

## 2024-03-27 PROCEDURE — 36415 COLL VENOUS BLD VENIPUNCTURE: CPT | Performed by: FAMILY MEDICINE

## 2024-03-27 SDOH — HEALTH STABILITY: PHYSICAL HEALTH: ON AVERAGE, HOW MANY DAYS PER WEEK DO YOU ENGAGE IN MODERATE TO STRENUOUS EXERCISE (LIKE A BRISK WALK)?: 5 DAYS

## 2024-03-27 ASSESSMENT — PAIN SCALES - GENERAL: PAINLEVEL: MILD PAIN (2)

## 2024-03-27 ASSESSMENT — SOCIAL DETERMINANTS OF HEALTH (SDOH): HOW OFTEN DO YOU GET TOGETHER WITH FRIENDS OR RELATIVES?: PATIENT DECLINED

## 2024-03-27 NOTE — PROGRESS NOTES
Preventive Care Visit  [facility] ZINA Tucker MD, Family Medicine  Mar 27, 2024      Assessment & Plan     Cervical cancer screening  She wants to schedule with female provider, encouraged her to do annually due to her h/x transplants     Health maintenance examination    - Lipid panel reflex to direct LDL Non-fasting; Future  - Lipid panel reflex to direct LDL Non-fasting    Patient has been advised of split billing requirements and indicates understanding: Yes    Counseling  Appropriate preventive services were discussed with this patient, including applicable screening as appropriate for fall prevention, nutrition, physical activity, Tobacco-use cessation, weight loss and cognition.  Checklist reviewing preventive services available has been given to the patient.  Reviewed patient's diet, addressing concerns and/or questions.   She is at risk for psychosocial distress and has been provided with information to reduce risk.       FUTURE APPOINTMENTS:       - Follow-up visit in 1 year     Janusz Barrett is a 49 year old, presenting for the following:  Physical        3/27/2024     2:54 PM   Additional Questions   Roomed by Amaal        Health Care Directive  Patient does not have a Health Care Directive or Living Will: Discussed advance care planning with patient; however, patient declined at this time.    HPI        3/27/2024   General Health   How would you rate your overall physical health? Good   Feel stress (tense, anxious, or unable to sleep) Only a little   (!) STRESS CONCERN      3/27/2024   Nutrition   Diet: Regular (no restrictions)         3/27/2024   Exercise   Days per week of moderate/strenous exercise 5 days         3/27/2024   Social Factors   Frequency of gathering with friends or relatives Patient declined   Worry food won't last until get money to buy more No   Food not last or not have enough money for food? No   Do you have housing?  Yes   Are you worried  about losing your housing? No   Lack of transportation? No   Unable to get utilities (heat,electricity)? No          No data to display                   3/27/2024   Dental   Dentist two times every year? Yes            Today's PHQ-2 Score:       3/27/2024     2:50 PM   PHQ-2 ( 1999 [employer])   Q1: Little interest or pleasure in doing things 0   Q2: Feeling down, depressed or hopeless 0   PHQ-2 Score 0   Q1: Little interest or pleasure in doing things Not at all   Q2: Feeling down, depressed or hopeless Not at all   PHQ-2 Score 0           3/27/2024   Substance Use   Alcohol more than 3/day or more than 7/wk Not Applicable   Do you use any other substances recreationally? No    (!) PRESCRIPTION DRUGS     Social History     Tobacco Use    Smoking status: Never    Smokeless tobacco: Never   Substance Use Topics    Alcohol use: No     Alcohol/week: 0.0 standard drinks of alcohol    Drug use: No             3/27/2024   Breast Cancer Screening   Family history of breast, colon, or ovarian cancer? No / Unknown         12/11/2023   LAST FHS-7 RESULTS   1st degree relative breast or ovarian cancer No   Any relative bilateral breast cancer No   Any male have breast cancer No   Any ONE woman have BOTH breast AND ovarian cancer No   Any woman with breast cancer before 50yrs No   2 or more relatives with breast AND/OR ovarian cancer No   2 or more relatives with breast AND/OR bowel cancer No        Mammogram Screening - Mammogram every 1-2 years updated in Health Maintenance based on mutual decision making      History of abnormal Pap smear: YES - other categories - see link Cervical Cytology Screening Guidelines        Latest Ref Rng & Units 6/21/2022     5:10 PM 11/6/2020    10:06 AM 11/6/2020    10:00 AM   PAP / HPV   PAP  Negative for Intraepithelial Lesion or Malignancy (NILM)      PAP (Historical)   NIL     HPV 16 DNA Negative Negative   Negative    HPV 18 DNA Negative Negative   Negative    Other HR HPV Negative Negative    Negative      ASCVD Risk   The 10-year ASCVD risk score (Jeanine HATFIELD, et al., 2019) is: 1.2%    Values used to calculate the score:      Age: 49 years      Sex: Female      Is Non- : No      Diabetic: No      Tobacco smoker: No      Systolic Blood Pressure: 128 mmHg      Is BP treated: No      HDL Cholesterol: 54 mg/dL      Total Cholesterol: 184 mg/dL           Reviewed and updated as needed this visit by Provider                    Past Medical History:   Diagnosis Date    Anemia     High risk medication use     Hyperparathyroidism, secondary renal (H24)     Immunosuppressed status (H24)     Kidney replaced by transplant 6/23/10    KIDNEY TRANSPLANT STATUS    Moraxella catarrhalis pneumonia (HCC) 3/28/2016    Pneumonia 2013    requiring hospitalization    Renal aplasia     since birth    Secondary cardiomyopathy (H) 2021    Single seizure (H)     felt secondary to uremia    TB (pulmonary tuberculosis)     treated with 4 drug regimen (INH, rifampin, ethambutol and pyrazinamide) for 6 months     Past Surgical History:   Procedure Laterality Date     SECTION  2014    Procedure:  SECTION;;  Surgeon: Griselda Becerra MD;  Location: [facility]    COLONOSCOPY N/A 2022    Procedure: COLONOSCOPY;  Surgeon: Gianna Santamaria MD;  Location:  GI    HEMODIALYSIS VIA AV FISTULA      left arm    INSERT INTRAUTERINE DEVICE      Paraguard. removed     REMOVE INTRAUTERINE DEVICE  2017    TRANSPLANT KIDNEY RECIPIENT LIVING UNRELATED  6/23/10     OB History    Para Term  AB Living   1 1 0 1 0 1   SAB IAB Ectopic Multiple Live Births   0 0 0 0 1      # Outcome Date GA Lbr Karri/2nd Weight Sex Delivery Anes PTL Lv   1  14 34w0d  2.58 kg (5 lb 11 oz) M CS-LTranv  Y JEREMÍAS      Name: TOMMY,BABY1 KEANU A      Apgar1: 3  Apgar5: 5     BP Readings from Last 3 Encounters:   24 128/84   23 122/79   23  127/82    Wt Readings from Last 3 Encounters:   24 53.5 kg (118 lb)   23 53.1 kg (117 lb)   23 52.6 kg (116 lb)                  Patient Active Problem List   Diagnosis    Kidney replaced by transplant    Aftercare following organ transplant    CARDIOVASCULAR SCREENING; LDL GOAL LESS THAN 100    Immunosuppressed status (H24)    Moraxella catarrhalis pneumonia    Pyelonephritis    Cervical cancer screening    History of recurrent urinary tract infection    EBV (Jaime-Barr virus) viremia    Vitamin D deficiency    Secondary cardiomyopathy (H)    Hyperparathyroidism (H24)    Need for pneumocystis prophylaxis     Past Surgical History:   Procedure Laterality Date     SECTION  2014    Procedure:  SECTION;;  Surgeon: Griselda Becerra MD;  Location: [facility]    COLONOSCOPY N/A 2022    Procedure: COLONOSCOPY;  Surgeon: Gianna Santamaria MD;  Location:  GI    HEMODIALYSIS VIA AV FISTULA      left arm    INSERT INTRAUTERINE DEVICE      Paraguard. removed     REMOVE INTRAUTERINE DEVICE  2017    TRANSPLANT KIDNEY RECIPIENT LIVING UNRELATED  6/23/10       Social History     Tobacco Use    Smoking status: Never    Smokeless tobacco: Never   Substance Use Topics    Alcohol use: No     Alcohol/week: 0.0 standard drinks of alcohol     Family History   Problem Relation Age of Onset    Respiratory Paternal Grandmother         PGM had pulmonary TB at age 85, then  of old age at 89; she lived with Nena    Eye Disorder Mother         retinal problems         Current Outpatient Medications   Medication Sig Dispense Refill    Acetaminophen (TYLENOL PO) Take 500 mg by mouth every 4 hours as needed for mild pain or fever      azaTHIOprine (IMURAN) 50 MG tablet Take 1 tablet (50 mg) by mouth daily 90 tablet 3    predniSONE (DELTASONE) 5 MG tablet Take 1 tablet (5 mg) by mouth daily 90 tablet 3    sulfamethoxazole-trimethoprim (BACTRIM) 400-80 MG tablet TAKE ONE  "TABLET BY MOUTH ONCE DAILY 90 tablet 3    tacrolimus (ENVARSUS XR) 0.75 MG 24 hr tablet Take 1 tablet (0.75 mg) by mouth every morning (before breakfast) 90 tablet 3    tacrolimus (ENVARSUS XR) 1 MG 24 hr tablet Take 1 tablet (1 mg) by mouth daily before breakfast HOLD FOR DOSE CHANGE 90 tablet 3    etonogestrel (IMPLANON/NEXPLANON) 68 MG IMPL 1 each (68 mg) by Subdermal route once for 1 dose 1 each 0     Allergies   Allergen Reactions    Nkda [No Known Drug Allergy]      Recent Labs   Lab Test 12/23/21  1509 03/06/20  0909 12/13/19  0806 12/26/18  1142 08/31/18  0844 04/02/18  0909 03/17/18  0045 04/08/17  1036 03/30/17  1816   *  --  106*  --  89  --   --   --   --    HDL 54  --  54  --  63  --   --   --   --    TRIG 119  --  95  --  68  --   --   --   --    ALT 23  --   --   --   --   --  20  --  22   CR  --  0.91 1.14*   < > 0.81   < > 0.88   < > 1.32*   GFRESTIMATED  --  75 57*   < > 76   < > 70   < > 44*   GFRESTBLACK  --  87 66   < > >90   < > 84   < > 53*   POTASSIUM  --  4.4 3.9   < > 4.0   < > 3.4   < > 4.0    < > = values in this interval not displayed.          Review of Systems  Constitutional, HEENT, cardiovascular, pulmonary, GI, , musculoskeletal, neuro, skin, endocrine and psych systems are negative, except as otherwise noted.     Objective    Exam  /84   Pulse 82   Temp 97.2  F (36.2  C) (Temporal)   Resp 17   Ht 1.499 m (4' 11\")   Wt 53.5 kg (118 lb)   SpO2 100%   BMI 23.83 kg/m     Estimated body mass index is 23.83 kg/m  as calculated from the following:    Height as of this encounter: 1.499 m (4' 11\").    Weight as of this encounter: 53.5 kg (118 lb).    Physical Exam  GENERAL: alert and no distress  EYES: Eyes grossly normal to inspection, PERRL and conjunctivae and sclerae normal  HENT: ear canals and TM's normal, nose and mouth without ulcers or lesions  NECK: no adenopathy, no asymmetry, masses, or scars  RESP: lungs clear to auscultation - no rales, rhonchi or " wheezes  CV: regular rate and rhythm, normal S1 S2, no S3 or S4, no murmur, click or rub, no peripheral edema  ABDOMEN: soft, nontender, no hepatosplenomegaly, no masses and bowel sounds normal  MS: no gross musculoskeletal defects noted, no edema  SKIN: no suspicious lesions or rashes  NEURO: Normal strength and tone, mentation intact and speech normal  BACK: no CVA tenderness, no paralumbar tenderness  PSYCH: mentation appears normal, affect normal/bright  LYMPH: no cervical, supraclavicular, axillary, or inguinal adenopathy        Signed Electronically by: Jorge L Tucker MD

## 2024-03-28 LAB
CHOLEST SERPL-MCNC: 215 MG/DL
FASTING STATUS PATIENT QL REPORTED: NO
HDLC SERPL-MCNC: 47 MG/DL
LDLC SERPL CALC-MCNC: 131 MG/DL
NONHDLC SERPL-MCNC: 168 MG/DL
TRIGL SERPL-MCNC: 186 MG/DL

## 2024-05-29 NOTE — PROVIDER NOTIFICATION
Pt spiked temp 101.2 oral, pt feeling generally run down. MD paged, tylenol given. Will continue to reassess.    4 (moderate pain)

## 2024-06-24 ENCOUNTER — TELEPHONE (OUTPATIENT)
Dept: TRANSPLANT | Facility: CLINIC | Age: 49
End: 2024-06-24

## 2024-06-24 ENCOUNTER — OFFICE VISIT (OUTPATIENT)
Dept: TRANSPLANT | Facility: CLINIC | Age: 49
End: 2024-06-24
Attending: INTERNAL MEDICINE
Payer: COMMERCIAL

## 2024-06-24 VITALS
HEART RATE: 79 BPM | OXYGEN SATURATION: 94 % | DIASTOLIC BLOOD PRESSURE: 84 MMHG | BODY MASS INDEX: 23.81 KG/M2 | WEIGHT: 117.9 LBS | SYSTOLIC BLOOD PRESSURE: 131 MMHG

## 2024-06-24 DIAGNOSIS — Z29.89 NEED FOR PNEUMOCYSTIS PROPHYLAXIS: ICD-10-CM

## 2024-06-24 DIAGNOSIS — Z94.0 KIDNEY REPLACED BY TRANSPLANT: Primary | ICD-10-CM

## 2024-06-24 DIAGNOSIS — N18.2 CKD (CHRONIC KIDNEY DISEASE) STAGE 2, GFR 60-89 ML/MIN: ICD-10-CM

## 2024-06-24 DIAGNOSIS — Z48.298 AFTERCARE FOLLOWING ORGAN TRANSPLANT: ICD-10-CM

## 2024-06-24 DIAGNOSIS — D84.9 IMMUNOSUPPRESSED STATUS (H): ICD-10-CM

## 2024-06-24 PROBLEM — N25.81 SECONDARY RENAL HYPERPARATHYROIDISM (H): Status: ACTIVE | Noted: 2021-05-07

## 2024-06-24 PROCEDURE — 99214 OFFICE O/P EST MOD 30 MIN: CPT | Performed by: INTERNAL MEDICINE

## 2024-06-24 PROCEDURE — 99213 OFFICE O/P EST LOW 20 MIN: CPT | Performed by: INTERNAL MEDICINE

## 2024-06-24 PROCEDURE — G2211 COMPLEX E/M VISIT ADD ON: HCPCS | Performed by: INTERNAL MEDICINE

## 2024-06-24 NOTE — PROGRESS NOTES
TRANSPLANT NEPHROLOGY CLINIC VISIT     Assessment & Plan   # LDKT: CKD Stage 2 - Stable   - Baseline Creatinine: ~ 0.9-1.1   - Proteinuria: Normal (<0.2 grams)   - DSA Hx: Not checked recently due to time from transplant   - Last cPRA: 7%   - BK Viremia: Not checked recently due to time from transplant   - Kidney Tx Biopsy Hx: No biopsy history.    # Immunosuppression: Tacrolimus extended release (goal 3-5), Azathioprine (dose 50 mg daily), and Prednisone (dose 5 mg daily)   - Induction with Recent Transplant:  High Intensity Protocol   - Continue with intensive monitoring of immunosuppression for efficacy and toxicity.   - Historical Changes in Immunosuppression:  Changed from mycophenolate to azathioprine due to GI issues and pregnancy planning.   - Changes: No    # Infection Prevention:      Last CD4 Level: 227 (Sep/2017)  - PJP: Sulfa/TMP (Bactrim)  - CMV IgG Ab High Risk Discordance (D+/R-): No  - EBV IgG Ab High Risk Discordance (D+/R-): No    # Blood Pressure: Controlled;  Goal BP: < 130/80   - Changes: No    # Elevated Blood Glucose: Glucose generally running ~ 100-110s    # Mineral Bone Disorder:    - Vitamin D; level: Not checked recently        On supplement: No  - Calcium; level: Normal        On supplement: No    # Electrolytes:   - Potassium; level: Normal        On supplement: No  - Bicarbonate; level: Normal        On supplement: No    # H/o EBV Viremia: Stable, minimal EBV PCR at ~ 1700 with last check Jun/2018.  EBV IgG Ab positive.  IgG level has not been checked.   - No need to follow EBV PCR, unless clinically indicated.    # Other Significant PMH:   - None     # Skin Cancer Risk:    - Discussed sun protection and recommend regular follow up with Dermatology.    # Transplant History:  Etiology of Kidney Failure: Unknown etiology (no kidney biopsy)  Tx: LDKT  Transplant: 6/23/2010 (Kidney)  Significant transplant-related complications: EBV Viremia    Transplant Office Phone Number:  [phone]    Assessment and plan was discussed with the patient and she voiced her understanding and agreement.    Return visit: Return in about 1 year (around 6/24/2025).    Walter Chong MD    The longitudinal plan of care for the diagnosis(es)/condition(s) as documented were addressed during this visit. Due to the added complexity in care, I will continue to support Nena in the subsequent management and with ongoing continuity of care.      Chief Complaint   Ms. Underwood is a 49 year old here for kidney transplant and immunosuppression management.     History of Present Illness    Ms. Underwood reports feeling good overall.  Since last clinic visit:   Hospitalizations: No   New Medical Issues: Yes; Has been bothered by plantar fasciitis of her left foot.  Chest pain or shortness of breath: No  Lower extremity swelling: No  Weight change: No  Nausea and vomiting: No  Diarrhea: Yes; Occasional loose stools depending on what she eats.  Heartburn symptoms: Yes; Rare symptoms, mostly with spicy foods.  Fever, sweats or chills: No  Urinary complaints: No    Home BP:  120/80s    Problem List   Patient Active Problem List   Diagnosis    Kidney replaced by transplant    Aftercare following organ transplant    CARDIOVASCULAR SCREENING; LDL GOAL LESS THAN 100    Immunosuppressed status (H24)    Pyelonephritis    Cervical cancer screening    History of recurrent urinary tract infection    EBV (Jaime-Barr virus) viremia    Vitamin D deficiency    Secondary cardiomyopathy (H)    Secondary renal hyperparathyroidism (H24)    Need for pneumocystis prophylaxis    CKD (chronic kidney disease) stage 2, GFR 60-89 ml/min       Allergies   Allergies   Allergen Reactions    Nkda [No Known Drug Allergy]        Medications   Current Outpatient Medications   Medication Sig Dispense Refill    Acetaminophen (TYLENOL PO) Take 500 mg by mouth every 4 hours as needed for mild pain or fever      azaTHIOprine (IMURAN) 50 MG tablet Take  1 tablet (50 mg) by mouth daily 90 tablet 3    etonogestrel (IMPLANON/NEXPLANON) 68 MG IMPL 1 each (68 mg) by Subdermal route once for 1 dose 1 each 0    predniSONE (DELTASONE) 5 MG tablet Take 1 tablet (5 mg) by mouth daily 90 tablet 3    sulfamethoxazole-trimethoprim (BACTRIM) 400-80 MG tablet TAKE ONE TABLET BY MOUTH ONCE DAILY 90 tablet 3    tacrolimus (ENVARSUS XR) 0.75 MG 24 hr tablet Take 1 tablet (0.75 mg) by mouth every morning (before breakfast) 90 tablet 3    tacrolimus (ENVARSUS XR) 1 MG 24 hr tablet Take 1 tablet (1 mg) by mouth daily before breakfast HOLD FOR DOSE CHANGE 90 tablet 3     Current Facility-Administered Medications   Medication Dose Route Frequency Provider Last Rate Last Admin    etonogestrel (NEXPLANON) subdermal implant 68 mg  1 each Subdermal Once Brad Werner PA-C         There are no discontinued medications.    Physical Exam   Vital Signs: /84   Pulse 79   Wt 53.5 kg (117 lb 14.4 oz)   SpO2 94%   BMI 23.81 kg/m      GENERAL APPEARANCE: alert and no distress  HENT: mouth without ulcers or lesions  RESP: lungs clear to auscultation - no rales, rhonchi or wheezes  CV: regular rhythm, normal rate, no rub, no murmur  EDEMA: no LE edema bilaterally  ABDOMEN: soft, nondistended, nontender, bowel sounds normal  MS: extremities normal - no gross deformities noted, no evidence of inflammation in joints, no muscle tenderness  SKIN: no rash  TX KIDNEY: normal  DIALYSIS ACCESS:  LUE AV fistula with NO thrill    Data         Latest Ref Rng & Units 6/7/2024     9:06 AM 3/1/2024     8:59 AM 12/22/2023     9:37 AM   Renal   Na (external) 135 - 144 mmol/L 137  140  140    K (external) 3.4 - 5.1 mmol/L 4.2  4.3  4.2    Cl 98 - 107 mmol/L 108  111  109    Cl (external) 98 - 107 mmol/L 108  111  109    CO2 (external) 22 - 30 mmol/L 23  25  23    BUN (external) 7 - 17 mg/dL 19  17  19    Cr (external) 0.52 - 1.04 mg/dL 1.03  0.99  1.09    Glucose (external) 74 - 100 mg/dL 114  110   107    Ca (external) 8.6 - 10.3 mg/dL 9.4  10.0  9.2          Latest Ref Rng & Units 6/21/2022     5:36 PM 7/1/2019     4:30 PM 6/14/2017     7:20 AM   Bone Health   Phosphorus 2.5 - 4.5 mg/dL  2.8  3.2    Vit D Def 20 - 75 ug/L 19  21           Latest Ref Rng & Units 6/7/2024     9:06 AM 3/1/2024     8:59 AM 12/22/2023     9:37 AM   Heme   WBC (external) 4.0 - 11.0 X10*3/uL 4.4  4.8  4.8    Hgb (external) 12.0 - 16.0 g/dL 13.5  14.4  14.4    Plt (external) 150 - 420 X10*3/uL 255  250  262          Latest Ref Rng & Units 12/23/2021     3:09 PM 7/1/2019     4:30 PM 3/17/2018    12:45 AM   Liver   AP 40 - 150 U/L 82   69    TBili 0.2 - 1.3 mg/dL 0.4   1.1    Bilirubin Direct 0.0 - 0.2 mg/dL 0.1      ALT 0 - 50 U/L 23   20    AST 0 - 45 U/L 24   28    Tot Protein 6.8 - 8.8 g/dL 7.3   8.0    Albumin 3.4 - 5.0 g/dL 3.9  4.0  4.2          Latest Ref Rng & Units 3/17/2018    12:45 AM 10/2/2015     8:40 PM 9/27/2015    12:00 PM   Pancreas   Lipase 73 - 393 U/L 241  208  210             Latest Ref Rng & Units 6/30/2018    10:08 AM 6/9/2018     9:13 AM 5/5/2018     8:55 AM   [facility] Virology   EBV DNA COPIES/ML EBVNEG^EBV DNA Not Detected [Copies]/mL 1,688  3,998  1,239    EBV DNA LOG OF COPIES <2.7 [Log_copies]/mL 3.2  3.6  3.1      Failed to redirect to the Timeline version of the REVFS SmartLink.  Recent Labs   Lab Test 06/25/19  0746 12/13/19  0805 03/06/20  0910   DOSTAC 06/24/2019 at 0710 am LAST DOSE 640 AM 12/12 LAST DOSE 0930AM ON 3/5/2020   TACROL 5.5 5.0 3.0*

## 2024-06-24 NOTE — LETTER
PHYSICIAN ORDERS      DATE & TIME ISSUED: 24 4:18 PM   PATIENT NAME: Nena Underwood   : 1975     [facility] MR# [if applicable]: [id number]     DIAGNOSIS:  Kidney Transplant  ICD-10 CODE: Z94.0     With next labs please draw:  -Tacrolimus drug level  -CBC  -BMP  - Vitamin D   - T-Cell Subset Profile  -EBV Capsid Antibody IgG    Every 3 Months           ?Hemogram and Platelet           ?Basic Metabolic Panel            ?/Tacrolimus/Prograf drug level (Ensure 24 hours between last dose and blood draw)         Every 6 Months                                                                                                                                                                                                                                                                            ?Urine for protein/creatinine    Any questions please call: [phone]  Please fax lab results to [phone].    .

## 2024-06-24 NOTE — LETTER
6/24/2024      Nena Underwood  [id number] [place] [address]      Dear Colleague,    Thank you for referring your patient, Nena Underwood, to the [facility] TRANSPLANT [facility]. Please see a copy of my visit note below.    TRANSPLANT NEPHROLOGY CLINIC VISIT     Assessment & Plan  # LDKT: CKD Stage 2 - Stable   - Baseline Creatinine: ~ 0.9-1.1   - Proteinuria: Normal (<0.2 grams)   - DSA Hx: Not checked recently due to time from transplant   - Last cPRA: 7%   - BK Viremia: Not checked recently due to time from transplant   - Kidney Tx Biopsy Hx: No biopsy history.    # Immunosuppression: Tacrolimus extended release (goal 3-5), Azathioprine (dose 50 mg daily), and Prednisone (dose 5 mg daily)   - Induction with Recent Transplant:  High Intensity Protocol   - Continue with intensive monitoring of immunosuppression for efficacy and toxicity.   - Historical Changes in Immunosuppression:  Changed from mycophenolate to azathioprine due to GI issues and pregnancy planning.   - Changes: No    # Infection Prevention:      Last CD4 Level: 227 (Sep/2017)  - PJP: Sulfa/TMP (Bactrim)  - CMV IgG Ab High Risk Discordance (D+/R-): No  - EBV IgG Ab High Risk Discordance (D+/R-): No    # Blood Pressure: Controlled;  Goal BP: < 130/80   - Changes: No    # Elevated Blood Glucose: Glucose generally running ~ 100-110s    # Mineral Bone Disorder:    - Vitamin D; level: Not checked recently        On supplement: No  - Calcium; level: Normal        On supplement: No    # Electrolytes:   - Potassium; level: Normal        On supplement: No  - Bicarbonate; level: Normal        On supplement: No    # H/o EBV Viremia: Stable, minimal EBV PCR at ~ 1700 with last check Jun/2018.  EBV IgG Ab positive.  IgG level has not been checked.   - No need to follow EBV PCR, unless clinically indicated.    # Other Significant PMH:   - None     # Skin Cancer Risk:    - Discussed sun protection and recommend regular follow up with  Dermatology.    # Transplant History:  Etiology of Kidney Failure: Unknown etiology (no kidney biopsy)  Tx: LDKT  Transplant: 6/23/2010 (Kidney)  Significant transplant-related complications: EBV Viremia    Transplant Office Phone Number: [phone]    Assessment and plan was discussed with the patient and she voiced her understanding and agreement.    Return visit: Return in about 1 year (around 6/24/2025).    Walter Chong MD    The longitudinal plan of care for the diagnosis(es)/condition(s) as documented were addressed during this visit. Due to the added complexity in care, I will continue to support Nena in the subsequent management and with ongoing continuity of care.      Chief Complaint  Ms. Underwood is a 49 year old here for kidney transplant and immunosuppression management.     History of Present Illness   Ms. Underwood reports feeling good overall.  Since last clinic visit:   Hospitalizations: No   New Medical Issues: Yes; Has been bothered by plantar fasciitis of her left foot.  Chest pain or shortness of breath: No  Lower extremity swelling: No  Weight change: No  Nausea and vomiting: No  Diarrhea: Yes; Occasional loose stools depending on what she eats.  Heartburn symptoms: Yes; Rare symptoms, mostly with spicy foods.  Fever, sweats or chills: No  Urinary complaints: No    Home BP:  120/80s    Problem List  Patient Active Problem List   Diagnosis     Kidney replaced by transplant     Aftercare following organ transplant     CARDIOVASCULAR SCREENING; LDL GOAL LESS THAN 100     Immunosuppressed status (H24)     Pyelonephritis     Cervical cancer screening     History of recurrent urinary tract infection     EBV (Jaime-Barr virus) viremia     Vitamin D deficiency     Secondary cardiomyopathy (H)     Secondary renal hyperparathyroidism (H24)     Need for pneumocystis prophylaxis     CKD (chronic kidney disease) stage 2, GFR 60-89 ml/min       Allergies  Allergies   Allergen Reactions     Nkda [No  Known Drug Allergy]        Medications  Current Outpatient Medications   Medication Sig Dispense Refill     Acetaminophen (TYLENOL PO) Take 500 mg by mouth every 4 hours as needed for mild pain or fever       azaTHIOprine (IMURAN) 50 MG tablet Take 1 tablet (50 mg) by mouth daily 90 tablet 3     etonogestrel (IMPLANON/NEXPLANON) 68 MG IMPL 1 each (68 mg) by Subdermal route once for 1 dose 1 each 0     predniSONE (DELTASONE) 5 MG tablet Take 1 tablet (5 mg) by mouth daily 90 tablet 3     sulfamethoxazole-trimethoprim (BACTRIM) 400-80 MG tablet TAKE ONE TABLET BY MOUTH ONCE DAILY 90 tablet 3     tacrolimus (ENVARSUS XR) 0.75 MG 24 hr tablet Take 1 tablet (0.75 mg) by mouth every morning (before breakfast) 90 tablet 3     tacrolimus (ENVARSUS XR) 1 MG 24 hr tablet Take 1 tablet (1 mg) by mouth daily before breakfast HOLD FOR DOSE CHANGE 90 tablet 3     Current Facility-Administered Medications   Medication Dose Route Frequency Provider Last Rate Last Admin     etonogestrel (NEXPLANON) subdermal implant 68 mg  1 each Subdermal Once Brad Werner PA-C         There are no discontinued medications.    Physical Exam  Vital Signs: /84   Pulse 79   Wt 53.5 kg (117 lb 14.4 oz)   SpO2 94%   BMI 23.81 kg/m      GENERAL APPEARANCE: alert and no distress  HENT: mouth without ulcers or lesions  RESP: lungs clear to auscultation - no rales, rhonchi or wheezes  CV: regular rhythm, normal rate, no rub, no murmur  EDEMA: no LE edema bilaterally  ABDOMEN: soft, nondistended, nontender, bowel sounds normal  MS: extremities normal - no gross deformities noted, no evidence of inflammation in joints, no muscle tenderness  SKIN: no rash  TX KIDNEY: normal  DIALYSIS ACCESS:  LUE AV fistula with NO thrill    Data        Latest Ref Rng & Units 6/7/2024     9:06 AM 3/1/2024     8:59 AM 12/22/2023     9:37 AM   Renal   Na (external) 135 - 144 mmol/L 137  140  140    K (external) 3.4 - 5.1 mmol/L 4.2  4.3  4.2    Cl 98 - 107  mmol/L 108  111  109    Cl (external) 98 - 107 mmol/L 108  111  109    CO2 (external) 22 - 30 mmol/L 23  25  23    BUN (external) 7 - 17 mg/dL 19  17  19    Cr (external) 0.52 - 1.04 mg/dL 1.03  0.99  1.09    Glucose (external) 74 - 100 mg/dL 114  110  107    Ca (external) 8.6 - 10.3 mg/dL 9.4  10.0  9.2          Latest Ref Rng & Units 6/21/2022     5:36 PM 7/1/2019     4:30 PM 6/14/2017     7:20 AM   Bone Health   Phosphorus 2.5 - 4.5 mg/dL  2.8  3.2    Vit D Def 20 - 75 ug/L 19  21           Latest Ref Rng & Units 6/7/2024     9:06 AM 3/1/2024     8:59 AM 12/22/2023     9:37 AM   Heme   WBC (external) 4.0 - 11.0 X10*3/uL 4.4  4.8  4.8    Hgb (external) 12.0 - 16.0 g/dL 13.5  14.4  14.4    Plt (external) 150 - 420 X10*3/uL 255  250  262          Latest Ref Rng & Units 12/23/2021     3:09 PM 7/1/2019     4:30 PM 3/17/2018    12:45 AM   Liver   AP 40 - 150 U/L 82   69    TBili 0.2 - 1.3 mg/dL 0.4   1.1    Bilirubin Direct 0.0 - 0.2 mg/dL 0.1      ALT 0 - 50 U/L 23   20    AST 0 - 45 U/L 24   28    Tot Protein 6.8 - 8.8 g/dL 7.3   8.0    Albumin 3.4 - 5.0 g/dL 3.9  4.0  4.2          Latest Ref Rng & Units 3/17/2018    12:45 AM 10/2/2015     8:40 PM 9/27/2015    12:00 PM   Pancreas   Lipase 73 - 393 U/L 241  208  210             Latest Ref Rng & Units 6/30/2018    10:08 AM 6/9/2018     9:13 AM 5/5/2018     8:55 AM   [facility] Txp Virology   EBV DNA COPIES/ML EBVNEG^EBV DNA Not Detected [Copies]/mL 1,688  3,998  1,239    EBV DNA LOG OF COPIES <2.7 [Log_copies]/mL 3.2  3.6  3.1      Failed to redirect to the Timeline version of the REVFS SmartLink.  Recent Labs   Lab Test 06/25/19  0746 12/13/19  0805 03/06/20  0910   DOSTAC 06/24/2019 at 0710 am LAST DOSE 640 AM 12/12 LAST DOSE 0930AM ON 3/5/2020   TACROL 5.5 5.0 3.0*              Again, thank you for allowing me to participate in the care of your patient.        Sincerely,        Walter Chong MD

## 2024-06-24 NOTE — LETTER
6/24/2024      RE: Nena ALMONTE Kj  [id number] [address]       TRANSPLANT NEPHROLOGY CLINIC VISIT     Assessment & Plan  # LDKT: CKD Stage 2 - Stable   - Baseline Creatinine: ~ 0.9-1.1   - Proteinuria: Normal (<0.2 grams)   - DSA Hx: Not checked recently due to time from transplant   - Last cPRA: 7%   - BK Viremia: Not checked recently due to time from transplant   - Kidney Tx Biopsy Hx: No biopsy history.    # Immunosuppression: Tacrolimus extended release (goal 3-5), Azathioprine (dose 50 mg daily), and Prednisone (dose 5 mg daily)   - Induction with Recent Transplant:  High Intensity Protocol   - Continue with intensive monitoring of immunosuppression for efficacy and toxicity.   - Historical Changes in Immunosuppression:  Changed from mycophenolate to azathioprine due to GI issues and pregnancy planning.   - Changes: No    # Infection Prevention:      Last CD4 Level: 227 (Sep/2017)  - PJP: Sulfa/TMP (Bactrim)  - CMV IgG Ab High Risk Discordance (D+/R-): No  - EBV IgG Ab High Risk Discordance (D+/R-): No    # Blood Pressure: Controlled;  Goal BP: < 130/80   - Changes: No    # Elevated Blood Glucose: Glucose generally running ~ 100-110s    # Mineral Bone Disorder:    - Vitamin D; level: Not checked recently        On supplement: No  - Calcium; level: Normal        On supplement: No    # Electrolytes:   - Potassium; level: Normal        On supplement: No  - Bicarbonate; level: Normal        On supplement: No    # H/o EBV Viremia: Stable, minimal EBV PCR at ~ 1700 with last check Jun/2018.  EBV IgG Ab positive.  IgG level has not been checked.   - No need to follow EBV PCR, unless clinically indicated.    # Other Significant PMH:   - None     # Skin Cancer Risk:    - Discussed sun protection and recommend regular follow up with Dermatology.    # Transplant History:  Etiology of Kidney Failure: Unknown etiology (no kidney biopsy)  Tx: LDKT  Transplant: 6/23/2010 (Kidney)  Significant  transplant-related complications: EBV Viremia    Transplant Office Phone Number: [phone]    Assessment and plan was discussed with the patient and she voiced her understanding and agreement.    Return visit: Return in about 1 year (around 6/24/2025).    Walter Chong MD    The longitudinal plan of care for the diagnosis(es)/condition(s) as documented were addressed during this visit. Due to the added complexity in care, I will continue to support Nena in the subsequent management and with ongoing continuity of care.      Chief Complaint  Ms. Underwood is a 49 year old here for kidney transplant and immunosuppression management.     History of Present Illness   Ms. Underwood reports feeling good overall.  Since last clinic visit:   Hospitalizations: No   New Medical Issues: Yes; Has been bothered by plantar fasciitis of her left foot.  Chest pain or shortness of breath: No  Lower extremity swelling: No  Weight change: No  Nausea and vomiting: No  Diarrhea: Yes; Occasional loose stools depending on what she eats.  Heartburn symptoms: Yes; Rare symptoms, mostly with spicy foods.  Fever, sweats or chills: No  Urinary complaints: No    Home BP:  120/80s    Problem List  Patient Active Problem List   Diagnosis     Kidney replaced by transplant     Aftercare following organ transplant     CARDIOVASCULAR SCREENING; LDL GOAL LESS THAN 100     Immunosuppressed status (H24)     Pyelonephritis     Cervical cancer screening     History of recurrent urinary tract infection     EBV (Jaime-Barr virus) viremia     Vitamin D deficiency     Secondary cardiomyopathy (H)     Secondary renal hyperparathyroidism (H24)     Need for pneumocystis prophylaxis     CKD (chronic kidney disease) stage 2, GFR 60-89 ml/min       Allergies  Allergies   Allergen Reactions     Nkda [No Known Drug Allergy]        Medications  Current Outpatient Medications   Medication Sig Dispense Refill     Acetaminophen (TYLENOL PO) Take 500 mg by mouth  every 4 hours as needed for mild pain or fever       azaTHIOprine (IMURAN) 50 MG tablet Take 1 tablet (50 mg) by mouth daily 90 tablet 3     etonogestrel (IMPLANON/NEXPLANON) 68 MG IMPL 1 each (68 mg) by Subdermal route once for 1 dose 1 each 0     predniSONE (DELTASONE) 5 MG tablet Take 1 tablet (5 mg) by mouth daily 90 tablet 3     sulfamethoxazole-trimethoprim (BACTRIM) 400-80 MG tablet TAKE ONE TABLET BY MOUTH ONCE DAILY 90 tablet 3     tacrolimus (ENVARSUS XR) 0.75 MG 24 hr tablet Take 1 tablet (0.75 mg) by mouth every morning (before breakfast) 90 tablet 3     tacrolimus (ENVARSUS XR) 1 MG 24 hr tablet Take 1 tablet (1 mg) by mouth daily before breakfast HOLD FOR DOSE CHANGE 90 tablet 3     Current Facility-Administered Medications   Medication Dose Route Frequency Provider Last Rate Last Admin     etonogestrel (NEXPLANON) subdermal implant 68 mg  1 each Subdermal Once Brad Werner PA-C         There are no discontinued medications.    Physical Exam  Vital Signs: /84   Pulse 79   Wt 53.5 kg (117 lb 14.4 oz)   SpO2 94%   BMI 23.81 kg/m      GENERAL APPEARANCE: alert and no distress  HENT: mouth without ulcers or lesions  RESP: lungs clear to auscultation - no rales, rhonchi or wheezes  CV: regular rhythm, normal rate, no rub, no murmur  EDEMA: no LE edema bilaterally  ABDOMEN: soft, nondistended, nontender, bowel sounds normal  MS: extremities normal - no gross deformities noted, no evidence of inflammation in joints, no muscle tenderness  SKIN: no rash  TX KIDNEY: normal  DIALYSIS ACCESS:  LUE AV fistula with NO thrill    Data        Latest Ref Rng & Units 6/7/2024     9:06 AM 3/1/2024     8:59 AM 12/22/2023     9:37 AM   Renal   Na (external) 135 - 144 mmol/L 137  140  140    K (external) 3.4 - 5.1 mmol/L 4.2  4.3  4.2    Cl 98 - 107 mmol/L 108  111  109    Cl (external) 98 - 107 mmol/L 108  111  109    CO2 (external) 22 - 30 mmol/L 23  25  23    BUN (external) 7 - 17 mg/dL 19  17  19     Cr (external) 0.52 - 1.04 mg/dL 1.03  0.99  1.09    Glucose (external) 74 - 100 mg/dL 114  110  107    Ca (external) 8.6 - 10.3 mg/dL 9.4  10.0  9.2          Latest Ref Rng & Units 6/21/2022     5:36 PM 7/1/2019     4:30 PM 6/14/2017     7:20 AM   Bone Health   Phosphorus 2.5 - 4.5 mg/dL  2.8  3.2    Vit D Def 20 - 75 ug/L 19  21           Latest Ref Rng & Units 6/7/2024     9:06 AM 3/1/2024     8:59 AM 12/22/2023     9:37 AM   Heme   WBC (external) 4.0 - 11.0 X10*3/uL 4.4  4.8  4.8    Hgb (external) 12.0 - 16.0 g/dL 13.5  14.4  14.4    Plt (external) 150 - 420 X10*3/uL 255  250  262          Latest Ref Rng & Units 12/23/2021     3:09 PM 7/1/2019     4:30 PM 3/17/2018    12:45 AM   Liver   AP 40 - 150 U/L 82   69    TBili 0.2 - 1.3 mg/dL 0.4   1.1    Bilirubin Direct 0.0 - 0.2 mg/dL 0.1      ALT 0 - 50 U/L 23   20    AST 0 - 45 U/L 24   28    Tot Protein 6.8 - 8.8 g/dL 7.3   8.0    Albumin 3.4 - 5.0 g/dL 3.9  4.0  4.2          Latest Ref Rng & Units 3/17/2018    12:45 AM 10/2/2015     8:40 PM 9/27/2015    12:00 PM   Pancreas   Lipase 73 - 393 U/L 241  208  210             Latest Ref Rng & Units 6/30/2018    10:08 AM 6/9/2018     9:13 AM 5/5/2018     8:55 AM   [facility] Txp Virology   EBV DNA COPIES/ML EBVNEG^EBV DNA Not Detected [Copies]/mL 1,688  3,998  1,239    EBV DNA LOG OF COPIES <2.7 [Log_copies]/mL 3.2  3.6  3.1      Failed to redirect to the Timeline version of the REVFS SmartLink.  Recent Labs   Lab Test 06/25/19  0746 12/13/19  0805 03/06/20  0910   DOSTAC 06/24/2019 at 0710 am LAST DOSE 640 AM 12/12 LAST DOSE 0930AM ON 3/5/2020   TACROL 5.5 5.0 3.0*            Walter Chong MD

## 2024-06-24 NOTE — TELEPHONE ENCOUNTER
----- Message from Walter Chong sent at 6/24/2024  2:56 PM CDT -----  Regarding: Clinic visit  Please check the following labs: Vitamin D, EBV IgG Ab, and CD4 level.

## 2024-06-24 NOTE — PATIENT INSTRUCTIONS
Patient Recommendations:  - No new recommendations at this time.    Transplant Patient Information  Your Post Transplant Coordinator is: Melvi Birmingham  For non urgent items, we encourage you to contact your coordinator/care team online via [employer]  You and your care team can also contact your transplant coordinator Monday - Friday, 8am - 5pm at [phone] (Option 2 to reach the coordinator or Option 4 to schedule an appointment).  After hours for urgent matters, please call [facility] at [phone].

## 2024-07-26 ENCOUNTER — MYC MEDICAL ADVICE (OUTPATIENT)
Dept: TRANSPLANT | Facility: CLINIC | Age: 49
End: 2024-07-26
Payer: COMMERCIAL

## 2024-08-05 DIAGNOSIS — Z94.0 KIDNEY REPLACED BY TRANSPLANT: ICD-10-CM

## 2024-08-05 DIAGNOSIS — Z94.0 KIDNEY TRANSPLANTED: Primary | ICD-10-CM

## 2024-08-05 RX ORDER — AZATHIOPRINE 50 MG/1
50 TABLET ORAL DAILY
Qty: 90 TABLET | Refills: 3 | Status: SHIPPED | OUTPATIENT
Start: 2024-08-05

## 2024-11-04 DIAGNOSIS — Z94.0 KIDNEY TRANSPLANTED: ICD-10-CM

## 2024-11-04 DIAGNOSIS — D84.9 IMMUNOSUPPRESSED STATUS (H): ICD-10-CM

## 2024-11-04 DIAGNOSIS — Z94.0 KIDNEY REPLACED BY TRANSPLANT: ICD-10-CM

## 2024-11-05 RX ORDER — SULFAMETHOXAZOLE AND TRIMETHOPRIM 400; 80 MG/1; MG/1
1 TABLET ORAL DAILY
Qty: 90 TABLET | Refills: 3 | Status: SHIPPED | OUTPATIENT
Start: 2024-11-05

## 2024-11-05 RX ORDER — PREDNISONE 5 MG/1
5 TABLET ORAL DAILY
Qty: 90 TABLET | Refills: 3 | Status: SHIPPED | OUTPATIENT
Start: 2024-11-05

## 2024-11-05 RX ORDER — TACROLIMUS 0.75 MG/1
1 TABLET, EXTENDED RELEASE ORAL
Qty: 90 TABLET | Refills: 3 | Status: SHIPPED | OUTPATIENT
Start: 2024-11-05

## 2024-12-26 ENCOUNTER — TELEPHONE (OUTPATIENT)
Dept: TRANSPLANT | Facility: CLINIC | Age: 49
End: 2024-12-26
Payer: COMMERCIAL

## 2024-12-26 DIAGNOSIS — Z94.0 KIDNEY TRANSPLANTED: Primary | ICD-10-CM

## 2024-12-26 DIAGNOSIS — Z79.899 ENCOUNTER FOR LONG-TERM CURRENT USE OF MEDICATION: ICD-10-CM

## 2024-12-26 DIAGNOSIS — Z94.0 KIDNEY REPLACED BY TRANSPLANT: ICD-10-CM

## 2024-12-26 DIAGNOSIS — Z48.298 AFTERCARE FOLLOWING ORGAN TRANSPLANT: ICD-10-CM

## 2024-12-26 NOTE — TELEPHONE ENCOUNTER
ISSUE:   Tacrolimus XR level 6.3 on 12/23/24, goal 3-5, dose 0.75 mg daily.    PLAN:   Call Patient and confirm this was an accurate 24-hour trough.   Verify Tacrolimus XR dose 0.75 mg daily.   Confirm no new medications or or missed doses.   Confirm no new illness / infection / diarrhea.   If accurate trough and accurate dose. Stay on the same dose and repeat level in 2 weeks.    Is this more than a 50% increase or decrease in current IS dose: No  If YES, justification: n/a    Repeat labs in 2 weeks.  *If > 50% change in immunosuppression dose, repeat labs in 1 week.     LPN TASK:   Please contact patient to review questions and discuss the plan.  Update prescription and place repeat lab orders as necessary.

## 2024-12-26 NOTE — TELEPHONE ENCOUNTER
Call placed to patient. No answer. Detailed voice message left with instructions listed  below. Will try back

## 2024-12-31 ENCOUNTER — TELEPHONE (OUTPATIENT)
Dept: TRANSPLANT | Facility: CLINIC | Age: 49
End: 2024-12-31
Payer: COMMERCIAL

## 2024-12-31 DIAGNOSIS — Z94.0 KIDNEY TRANSPLANTED: Primary | ICD-10-CM

## 2024-12-31 NOTE — TELEPHONE ENCOUNTER
Pt returning call and stated it was not a good 24 hour trough and yes she is still taking 0.75 on the Envarsus. Caller also stated that she will repeat labs in 1-2 weeks.

## 2025-03-22 SDOH — HEALTH STABILITY: PHYSICAL HEALTH: ON AVERAGE, HOW MANY DAYS PER WEEK DO YOU ENGAGE IN MODERATE TO STRENUOUS EXERCISE (LIKE A BRISK WALK)?: 0 DAYS

## 2025-03-22 SDOH — HEALTH STABILITY: PHYSICAL HEALTH: ON AVERAGE, HOW MANY MINUTES DO YOU ENGAGE IN EXERCISE AT THIS LEVEL?: 0 MIN

## 2025-03-22 ASSESSMENT — SOCIAL DETERMINANTS OF HEALTH (SDOH): HOW OFTEN DO YOU GET TOGETHER WITH FRIENDS OR RELATIVES?: PATIENT DECLINED

## 2025-03-27 ENCOUNTER — OFFICE VISIT (OUTPATIENT)
Dept: FAMILY MEDICINE | Facility: CLINIC | Age: 50
End: 2025-03-27
Payer: COMMERCIAL

## 2025-03-27 VITALS
WEIGHT: 119.1 LBS | HEIGHT: 59 IN | OXYGEN SATURATION: 98 % | BODY MASS INDEX: 24.01 KG/M2 | SYSTOLIC BLOOD PRESSURE: 120 MMHG | HEART RATE: 77 BPM | RESPIRATION RATE: 16 BRPM | DIASTOLIC BLOOD PRESSURE: 82 MMHG | TEMPERATURE: 97.7 F

## 2025-03-27 DIAGNOSIS — Z00.00 HEALTH MAINTENANCE EXAMINATION: Primary | ICD-10-CM

## 2025-03-27 DIAGNOSIS — N18.2 CKD (CHRONIC KIDNEY DISEASE) STAGE 2, GFR 60-89 ML/MIN: ICD-10-CM

## 2025-03-27 DIAGNOSIS — Z79.899 ENCOUNTER FOR LONG-TERM CURRENT USE OF MEDICATION: ICD-10-CM

## 2025-03-27 DIAGNOSIS — Z94.0 KIDNEY REPLACED BY TRANSPLANT: ICD-10-CM

## 2025-03-27 DIAGNOSIS — Z12.4 CERVICAL CANCER SCREENING: ICD-10-CM

## 2025-03-27 DIAGNOSIS — Z94.0 KIDNEY TRANSPLANTED: ICD-10-CM

## 2025-03-27 DIAGNOSIS — Z48.298 AFTERCARE FOLLOWING ORGAN TRANSPLANT: ICD-10-CM

## 2025-03-27 LAB
ALBUMIN MFR UR ELPH: 16.9 MG/DL
ALBUMIN SERPL BCG-MCNC: 4.5 G/DL (ref 3.5–5.2)
ALP SERPL-CCNC: 96 U/L (ref 40–150)
ALT SERPL W P-5'-P-CCNC: 18 U/L (ref 0–50)
ANION GAP SERPL CALCULATED.3IONS-SCNC: 11 MMOL/L (ref 7–15)
AST SERPL W P-5'-P-CCNC: 30 U/L (ref 0–45)
BILIRUB SERPL-MCNC: 0.5 MG/DL
BUN SERPL-MCNC: 22 MG/DL (ref 6–20)
CALCIUM SERPL-MCNC: 9.5 MG/DL (ref 8.8–10.4)
CHLORIDE SERPL-SCNC: 105 MMOL/L (ref 98–107)
CHOLEST SERPL-MCNC: 247 MG/DL
CREAT SERPL-MCNC: 1.37 MG/DL (ref 0.51–0.95)
CREAT UR-MCNC: 225 MG/DL
CREAT UR-MCNC: 225 MG/DL
EGFRCR SERPLBLD CKD-EPI 2021: 46 ML/MIN/1.73M2
ERYTHROCYTE [DISTWIDTH] IN BLOOD BY AUTOMATED COUNT: 12.3 % (ref 10–15)
FASTING STATUS PATIENT QL REPORTED: NO
FASTING STATUS PATIENT QL REPORTED: NO
GLUCOSE SERPL-MCNC: 90 MG/DL (ref 70–99)
HCO3 SERPL-SCNC: 22 MMOL/L (ref 22–29)
HCT VFR BLD AUTO: 39.7 % (ref 35–47)
HDLC SERPL-MCNC: 50 MG/DL
HGB BLD-MCNC: 14.2 G/DL (ref 11.7–15.7)
LDLC SERPL CALC-MCNC: 170 MG/DL
MCH RBC QN AUTO: 35.1 PG (ref 26.5–33)
MCHC RBC AUTO-ENTMCNC: 35.8 G/DL (ref 31.5–36.5)
MCV RBC AUTO: 98 FL (ref 78–100)
MICROALBUMIN UR-MCNC: 34 MG/L
MICROALBUMIN/CREAT UR: 15.11 MG/G CR (ref 0–25)
NONHDLC SERPL-MCNC: 197 MG/DL
PLATELET # BLD AUTO: 259 10E3/UL (ref 150–450)
POTASSIUM SERPL-SCNC: 4.2 MMOL/L (ref 3.4–5.3)
PROT SERPL-MCNC: 7.5 G/DL (ref 6.4–8.3)
PROT/CREAT 24H UR: 0.08 MG/MG CR (ref 0–0.2)
RBC # BLD AUTO: 4.05 10E6/UL (ref 3.8–5.2)
SODIUM SERPL-SCNC: 138 MMOL/L (ref 135–145)
TRIGL SERPL-MCNC: 133 MG/DL
WBC # BLD AUTO: 8 10E3/UL (ref 4–11)

## 2025-03-27 NOTE — PROGRESS NOTES
Preventive Care Visit  [facility] ZINA Tucker MD, Family Medicine  Mar 27, 2025      Assessment & Plan     CKD (chronic kidney disease) stage 2, GFR 60-89 ml/min  Has been stable with current dose of meds, will keep monitoring   - Albumin Random Urine Quantitative with Creat Ratio; Future    Cervical cancer screening  She will schedule with female provider soon    Health maintenance examination    - Albumin Random Urine Quantitative with Creat Ratio; Future  - Lipid panel reflex to direct LDL Non-fasting; Future  - CBC with platelets; Future  - Comprehensive metabolic panel (BMP + Alb, Alk Phos, ALT, AST, Total. Bili, TP); Future    Patient has been advised of split billing requirements and indicates understanding: Yes        Counseling  Appropriate preventive services were addressed with this patient via screening, questionnaire, or discussion as appropriate for fall prevention, nutrition, physical activity, Tobacco-use cessation, social engagement, weight loss and cognition.  Checklist reviewing preventive services available has been given to the patient.  Reviewed patient's diet, addressing concerns and/or questions.       FUTURE APPOINTMENTS:       - Follow-up visit in 1 year     Janusz Barrett is a 50 year old, presenting for the following:  Physical (Pt not fasitng)           HPI      Pt made a separate appointment next month for her pap.      Advance Care Planning  Patient does not have a Health Care Directive: Discussed advance care planning with patient; however, patient declined at this time.      3/22/2025   General Health   How would you rate your overall physical health? Good   Feel stress (tense, anxious, or unable to sleep) Only a little   (!) STRESS CONCERN      3/22/2025   Nutrition   Diet: Regular (no restrictions)         3/22/2025   Exercise   Days per week of moderate/strenous exercise 0 days   Average minutes spent exercising at this level 0 min   (!)  EXERCISE CONCERN      3/22/2025   Social Factors   Frequency of gathering with friends or relatives Patient declined   Worry food won't last until get money to buy more No   Food not last or not have enough money for food? No   Do you have housing? (Housing is defined as stable permanent housing and does not include staying ouside in a car, in a tent, in an abandoned building, in an overnight shelter, or couch-surfing.) Yes   Are you worried about losing your housing? No   Lack of transportation? No   Unable to get utilities (heat,electricity)? No         3/22/2025   Fall Risk   Fallen 2 or more times in the past year? No   Trouble with walking or balance? No          3/22/2025   Dental   Dentist two times every year? Yes           Today's PHQ-2 Score:       3/27/2025     2:53 PM   PHQ-2 ( 1999 [employer])   Q1: Little interest or pleasure in doing things 0   Q2: Feeling down, depressed or hopeless 0   PHQ-2 Score 0    Q1: Little interest or pleasure in doing things Not at all   Q2: Feeling down, depressed or hopeless Not at all   PHQ-2 Score 0       Patient-reported           3/22/2025   Substance Use   Alcohol more than 3/day or more than 7/wk Not Applicable   Do you use any other substances recreationally? No     Social History     Tobacco Use    Smoking status: Never    Smokeless tobacco: Never   Vaping Use    Vaping status: Never Used   Substance Use Topics    Alcohol use: No     Alcohol/week: 0.0 standard drinks of alcohol    Drug use: No           12/11/2023   LAST FHS-7 RESULTS   1st degree relative breast or ovarian cancer No   Any relative bilateral breast cancer No   Any male have breast cancer No   Any ONE woman have BOTH breast AND ovarian cancer No   Any woman with breast cancer before 50yrs No   2 or more relatives with breast AND/OR ovarian cancer No   2 or more relatives with breast AND/OR bowel cancer No        Mammogram Screening - Mammogram every 1-2 years updated in Health Maintenance based on  mutual decision making      History of abnormal Pap smear: frequent pap indicated due to her immune compromised status         Latest Ref Rng & Units 2022     5:10 PM 2020    10:06 AM 2020    10:00 AM   PAP / HPV   PAP  Negative for Intraepithelial Lesion or Malignancy (NILM)      PAP (Historical)   NIL     HPV 16 DNA Negative Negative   Negative    HPV 18 DNA Negative Negative   Negative    Other HR HPV Negative Negative   Negative      ASCVD Risk   The 10-year ASCVD risk score (Jeanine HATFIELD, et al., 2019) is: 1.7%    Values used to calculate the score:      Age: 50 years      Sex: Female      Is Non- : No      Diabetic: No      Tobacco smoker: No      Systolic Blood Pressure: 120 mmHg      Is BP treated: No      HDL Cholesterol: 47 mg/dL      Total Cholesterol: 215 mg/dL           Reviewed and updated as needed this visit by Provider                    Past Medical History:   Diagnosis Date    Anemia     High risk medication use     Hyperparathyroidism, secondary renal     Immunosuppressed status     Kidney replaced by transplant 6/23/10    KIDNEY TRANSPLANT STATUS    Moraxella catarrhalis pneumonia (HCC) 3/28/2016    Pneumonia 2013    requiring hospitalization    Renal aplasia     since birth    Secondary cardiomyopathy (H) 2021    Single seizure (H)     felt secondary to uremia    TB (pulmonary tuberculosis) 2006    treated with 4 drug regimen (INH, rifampin, ethambutol and pyrazinamide) for 6 months     Past Surgical History:   Procedure Laterality Date     SECTION  2014    Procedure:  SECTION;;  Surgeon: Griselda Becerra MD;  Location: [facility]    COLONOSCOPY N/A 2022    Procedure: COLONOSCOPY;  Surgeon: Gianna Santamaria MD;  Location:  GI    HEMODIALYSIS VIA AV FISTULA      left arm    INSERT INTRAUTERINE DEVICE      Paraguard. removed     REMOVE INTRAUTERINE DEVICE  2017    TRANSPLANT KIDNEY RECIPIENT  LIVING UNRELATED  6/23/10     OB History    Para Term  AB Living   1 1 0 1 0 1   SAB IAB Ectopic Multiple Live Births   0 0 0 0 1      # Outcome Date GA Lbr Karri/2nd Weight Sex Type Anes PTL Lv   1  14 34w0d  2.58 kg (5 lb 11 oz) M CS-LTranv  Y JEREMÍAS      Name: MARGOTH SANDERSAGROS A      Apgar1: 3  Apgar5: 5     Labs reviewed in EPIC  BP Readings from Last 3 Encounters:   25 120/82   24 131/84   24 128/84    Wt Readings from Last 3 Encounters:   25 54 kg (119 lb 1.6 oz)   24 53.5 kg (117 lb 14.4 oz)   24 53.5 kg (118 lb)                  Patient Active Problem List   Diagnosis    Kidney replaced by transplant    Aftercare following organ transplant    CARDIOVASCULAR SCREENING; LDL GOAL LESS THAN 100    Immunosuppressed status    Pyelonephritis    Cervical cancer screening    History of recurrent urinary tract infection    EBV (Jaime-Barr virus) viremia    Vitamin D deficiency    Secondary cardiomyopathy (H)    Secondary renal hyperparathyroidism    Need for pneumocystis prophylaxis    CKD (chronic kidney disease) stage 2, GFR 60-89 ml/min     Past Surgical History:   Procedure Laterality Date     SECTION  2014    Procedure:  SECTION;;  Surgeon: Griselda Becerra MD;  Location: [facility]    COLONOSCOPY N/A 2022    Procedure: COLONOSCOPY;  Surgeon: Gianna Santamaria MD;  Location:  GI    HEMODIALYSIS VIA AV FISTULA      left arm    INSERT INTRAUTERINE DEVICE      Paraguard. removed     REMOVE INTRAUTERINE DEVICE  2017    TRANSPLANT KIDNEY RECIPIENT LIVING UNRELATED  6/23/10       Social History     Tobacco Use    Smoking status: Never    Smokeless tobacco: Never   Substance Use Topics    Alcohol use: No     Alcohol/week: 0.0 standard drinks of alcohol     Family History   Problem Relation Age of Onset    Respiratory Paternal Grandmother         PGM had pulmonary TB at age 85, then  of old age at 89; she  "lived with Nena    Eye Disorder Mother         retinal problems         Current Outpatient Medications   Medication Sig Dispense Refill    Acetaminophen (TYLENOL PO) Take 500 mg by mouth every 4 hours as needed for mild pain or fever      azaTHIOprine (IMURAN) 50 MG tablet Take 1 tablet (50 mg) by mouth daily 90 tablet 3    ENVARSUS XR 0.75 MG 24 hr tablet TAKE ONE TABLET BY MOUTH EVERY MORNING BEFORE BREAKFAST 90 tablet 3    predniSONE (DELTASONE) 5 MG tablet TAKE ONE TABLET BY MOUTH ONCE DAILY 90 tablet 3    sulfamethoxazole-trimethoprim (BACTRIM) 400-80 MG tablet TAKE ONE TABLET BY MOUTH ONCE DAILY 90 tablet 3    etonogestrel (IMPLANON/NEXPLANON) 68 MG IMPL 1 each (68 mg) by Subdermal route once for 1 dose 1 each 0    tacrolimus (ENVARSUS XR) 1 MG 24 hr tablet Take 1 tablet (1 mg) by mouth daily before breakfast HOLD FOR DOSE CHANGE 90 tablet 3     Allergies   Allergen Reactions    Nkda [No Known Drug Allergy]      Recent Labs   Lab Test 03/27/24  1529 12/23/21  1509 03/06/20  0909 12/13/19  0806 04/02/18  0909 03/17/18  0045 04/08/17  1036 03/30/17  1816   * 106*  --  106*   < >  --   --   --    HDL 47* 54  --  54   < >  --   --   --    TRIG 186* 119  --  95   < >  --   --   --    ALT  --  23  --   --   --  20  --  22   CR  --   --  0.91 1.14*   < > 0.88   < > 1.32*   GFRESTIMATED  --   --  75 57*   < > 70   < > 44*   GFRESTBLACK  --   --  87 66   < > 84   < > 53*   POTASSIUM  --   --  4.4 3.9   < > 3.4   < > 4.0    < > = values in this interval not displayed.               Review of Systems  Constitutional, HEENT, cardiovascular, pulmonary, GI, , musculoskeletal, neuro, skin, endocrine and psych systems are negative, except as otherwise noted.     Objective    Exam  /82 (BP Location: Right arm, Patient Position: Sitting, Cuff Size: Adult Regular)   Pulse 77   Temp 97.7  F (36.5  C) (Temporal)   Resp 16   Ht 1.499 m (4' 11\")   Wt 54 kg (119 lb 1.6 oz)   SpO2 98%   BMI 24.06 kg/m   " "  Estimated body mass index is 24.06 kg/m  as calculated from the following:    Height as of this encounter: 1.499 m (4' 11\").    Weight as of this encounter: 54 kg (119 lb 1.6 oz).    Physical Exam  GENERAL: alert and no distress  EYES: Eyes grossly normal to inspection, PERRL and conjunctivae and sclerae normal  HENT: ear canals and TM's normal, nose and mouth without ulcers or lesions  NECK: no adenopathy, no asymmetry, masses, or scars  RESP: lungs clear to auscultation - no rales, rhonchi or wheezes  CV: regular rate and rhythm, normal S1 S2, no S3 or S4, no murmur, click or rub, no peripheral edema  ABDOMEN: soft, nontender, no hepatosplenomegaly, no masses and bowel sounds normal  MS: no gross musculoskeletal defects noted, no edema  SKIN: no suspicious lesions or rashes  NEURO: Normal strength and tone, mentation intact and speech normal  BACK: no CVA tenderness, no paralumbar tenderness  PSYCH: mentation appears normal, affect normal/bright  LYMPH: no cervical, supraclavicular, axillary, or inguinal adenopathy        Signed Electronically by: Jorge L Tucker MD    "

## 2025-03-28 ENCOUNTER — MYC MEDICAL ADVICE (OUTPATIENT)
Dept: TRANSPLANT | Facility: CLINIC | Age: 50
End: 2025-03-28
Payer: COMMERCIAL

## 2025-03-28 DIAGNOSIS — Z94.0 KIDNEY TRANSPLANTED: Primary | ICD-10-CM

## 2025-04-01 ENCOUNTER — MYC MEDICAL ADVICE (OUTPATIENT)
Dept: FAMILY MEDICINE | Facility: CLINIC | Age: 50
End: 2025-04-01
Payer: COMMERCIAL

## 2025-04-01 NOTE — TELEPHONE ENCOUNTER
Pcp to follow up.   If patient calls back you can give the message she can hold on medication for now   yes

## 2025-04-03 NOTE — TELEPHONE ENCOUNTER
She can work on life style modification including low fat/carb diet with regular cardio exercise(35 min per day 4-5 time per week) over next 6 months and recheck.   If she wants discussion with dietician, we can refer her. Please check on with her     ths

## 2025-05-06 ENCOUNTER — TELEPHONE (OUTPATIENT)
Dept: FAMILY MEDICINE | Facility: CLINIC | Age: 50
End: 2025-05-06
Payer: COMMERCIAL

## 2025-05-06 ENCOUNTER — TELEPHONE (OUTPATIENT)
Dept: TRANSPLANT | Facility: CLINIC | Age: 50
End: 2025-05-06
Payer: COMMERCIAL

## 2025-05-06 NOTE — TELEPHONE ENCOUNTER
It is a nonurgent appointment if patient can reschedule that in the next 2 weeks when she is fully recovered and improved that would be better.

## 2025-05-06 NOTE — TELEPHONE ENCOUNTER
Patient Call: General  Route to LPN    Reason for call: Pt called to speak to RNCC. Pt went to ER due to pain in surgery site and cough, was diagnosed with COVID and Influenza A. Pt was prescribed Paxlovid but was warned it can interfere with tacrolimus. She says she is feeling better, no fever, and asks if taking Paxlovid is necessary as she does not want to risk rejection.    Call back needed? Yes    Return Call Needed  Same as documented in contacts section  When to return call?: Same day: Route High Priority

## 2025-05-06 NOTE — TELEPHONE ENCOUNTER
Patient given message from Dr. Dela Cruz. Patient states that she has already rescheduled for 6/3/25. Gabriella Lala RN

## 2025-05-06 NOTE — TELEPHONE ENCOUNTER
Hannah Alcocer,   Pt tested positive for COVID yesterday 5/5/2025. Symptoms started last week. Pt has pap smear appointment 5/8/2025 and would like to know if she should wear a mask to the appointment or if the appointment should be rescheduled. Please advise.       Jacinda Boykin RN

## 2025-05-06 NOTE — LETTER
Nena Underwood  [id number] [facility] [address]                May 6, 2025    To Whom it May Concern,    Ms. Underwood has a history of organ transplantation and remains on immunosuppressive medications, increasing the period of time in which she is communicable for, and may spread, COVID illness.   According to [facility] Solid Organ Transplant guidelines, as aligned with recommendations by the Center for Disease Control (CDC), Ms. Underwood should remain isolated for 20 days from COVID symptom onset.     Thank you,    .

## 2025-05-06 NOTE — TELEPHONE ENCOUNTER
RNCC spoke with Pamela given COVID + test on 5/5/25. She was prescribed Paxlovid, but has not started it and is not interested in starting Paxlovid.     She has remained afebrile, but symptoms of runny nose, sore throat & congestion started on / around 4/27/25.   She was seen in the ED for pain over her kidney transplant site. Per her report, imaging & labs revealed no concerns, but it was likely body aches that contributed to this pain.    RNCC advised Pamela notify SOT if her symptoms worsen or if she develops a fever, proceed to ED if any shortness of breath or chest pain.    Pamela reports she is feeling better 5/6/2025.      RNCC advised she isolate per CDC and [facility] SOT guidelines - 20days. Letter provided.

## 2025-06-03 ENCOUNTER — RESULTS FOLLOW-UP (OUTPATIENT)
Dept: TRANSPLANT | Facility: CLINIC | Age: 50
End: 2025-06-03

## 2025-06-03 ENCOUNTER — OFFICE VISIT (OUTPATIENT)
Dept: FAMILY MEDICINE | Facility: CLINIC | Age: 50
End: 2025-06-03
Payer: COMMERCIAL

## 2025-06-03 VITALS
DIASTOLIC BLOOD PRESSURE: 80 MMHG | BODY MASS INDEX: 23.36 KG/M2 | HEIGHT: 60 IN | HEART RATE: 79 BPM | SYSTOLIC BLOOD PRESSURE: 115 MMHG | RESPIRATION RATE: 22 BRPM | TEMPERATURE: 98 F | OXYGEN SATURATION: 97 % | WEIGHT: 119 LBS

## 2025-06-03 DIAGNOSIS — Z12.4 CERVICAL CANCER SCREENING: Primary | ICD-10-CM

## 2025-06-03 DIAGNOSIS — D84.9 IMMUNOSUPPRESSED STATUS: ICD-10-CM

## 2025-06-03 PROCEDURE — 1126F AMNT PAIN NOTED NONE PRSNT: CPT | Performed by: NURSE PRACTITIONER

## 2025-06-03 PROCEDURE — 3074F SYST BP LT 130 MM HG: CPT | Performed by: NURSE PRACTITIONER

## 2025-06-03 PROCEDURE — 87624 HPV HI-RISK TYP POOLED RSLT: CPT | Performed by: NURSE PRACTITIONER

## 2025-06-03 PROCEDURE — 3079F DIAST BP 80-89 MM HG: CPT | Performed by: NURSE PRACTITIONER

## 2025-06-03 PROCEDURE — 99396 PREV VISIT EST AGE 40-64: CPT | Performed by: NURSE PRACTITIONER

## 2025-06-03 ASSESSMENT — PAIN SCALES - GENERAL: PAINLEVEL_OUTOF10: NO PAIN (0)

## 2025-06-03 NOTE — PROGRESS NOTES
Assessment & Plan     Cervical cancer screening    - HPV and Gynecologic Cytology Panel - Recommended Age 30 - 65 Years    Immunosuppressed status    - HPV and Gynecologic Cytology Panel - Recommended Age 30 - 65 Years                Janusz Barrett is a 50 year old, presenting for the following health issues:  Gyn Exam (Pap only )        6/3/2025     4:18 PM   Additional Questions   Roomed by Ev NICOLE     History of Present Illness       Reason for visit:  Annual papsmear   She is taking medications regularly.      Pt is here for routine pap smear, physical with male provider in  march 2025    Here for PAP/hpv only. Has screening yearly due to immunosuppression        Objective    There were no vitals taken for this visit.  There is no height or weight on file to calculate BMI.  Physical Exam   Cervix without lesion, friability. Normal appearing.            Signed Electronically by: BETTE Crabtree CNP    
unable to answer, patient is intubated

## 2025-06-04 LAB
HPV HR 12 DNA CVX QL NAA+PROBE: NEGATIVE
HPV16 DNA CVX QL NAA+PROBE: NEGATIVE
HPV18 DNA CVX QL NAA+PROBE: NEGATIVE
HUMAN PAPILLOMA VIRUS FINAL DIAGNOSIS: NORMAL

## 2025-06-05 ENCOUNTER — RESULTS FOLLOW-UP (OUTPATIENT)
Dept: OBGYN | Facility: CLINIC | Age: 50
End: 2025-06-05

## 2025-06-05 DIAGNOSIS — Z12.4 CERVICAL CANCER SCREENING: Primary | ICD-10-CM

## 2025-06-09 LAB
BKR AP ASSOCIATED HPV REPORT: NORMAL
BKR LAB AP GYN ADEQUACY: NORMAL
BKR LAB AP GYN INTERPRETATION: NORMAL
BKR LAB AP PREVIOUS ABNORMAL: NORMAL
PATH REPORT.COMMENTS IMP SPEC: NORMAL
PATH REPORT.COMMENTS IMP SPEC: NORMAL
PATH REPORT.RELEVANT HX SPEC: NORMAL

## 2025-07-29 DIAGNOSIS — Z94.0 KIDNEY REPLACED BY TRANSPLANT: ICD-10-CM

## 2025-07-29 DIAGNOSIS — Z94.0 KIDNEY TRANSPLANTED: ICD-10-CM

## 2025-07-29 RX ORDER — AZATHIOPRINE 50 MG/1
50 TABLET ORAL DAILY
Qty: 90 TABLET | Refills: 3 | Status: SHIPPED | OUTPATIENT
Start: 2025-07-29

## 2025-08-14 ENCOUNTER — TELEPHONE (OUTPATIENT)
Dept: TRANSPLANT | Facility: CLINIC | Age: 50
End: 2025-08-14
Payer: COMMERCIAL

## 2025-08-14 DIAGNOSIS — Z94.0 KIDNEY TRANSPLANTED: Primary | ICD-10-CM

## 2025-09-01 ENCOUNTER — RESULTS FOLLOW-UP (OUTPATIENT)
Dept: MULTI SPECIALTY CLINIC | Facility: CLINIC | Age: 50
End: 2025-09-01
Payer: COMMERCIAL

## (undated) RX ORDER — FENTANYL CITRATE 50 UG/ML
INJECTION, SOLUTION INTRAMUSCULAR; INTRAVENOUS
Status: DISPENSED
Start: 2022-12-28

## (undated) RX ORDER — FUROSEMIDE 10 MG/ML
INJECTION INTRAMUSCULAR; INTRAVENOUS
Status: DISPENSED
Start: 2017-03-22